# Patient Record
Sex: FEMALE | Race: WHITE | NOT HISPANIC OR LATINO | Employment: OTHER | URBAN - METROPOLITAN AREA
[De-identification: names, ages, dates, MRNs, and addresses within clinical notes are randomized per-mention and may not be internally consistent; named-entity substitution may affect disease eponyms.]

---

## 2017-03-30 ENCOUNTER — TRANSCRIBE ORDERS (OUTPATIENT)
Dept: ADMINISTRATIVE | Facility: HOSPITAL | Age: 62
End: 2017-03-30

## 2017-03-30 DIAGNOSIS — I73.9 CLAUDICATION (HCC): Primary | ICD-10-CM

## 2017-04-10 ENCOUNTER — HOSPITAL ENCOUNTER (OUTPATIENT)
Dept: GENERAL RADIOLOGY | Facility: HOSPITAL | Age: 62
Discharge: HOME | End: 2017-04-10
Attending: SURGERY

## 2017-04-10 ENCOUNTER — HOSPITAL ENCOUNTER (OUTPATIENT)
Dept: CARDIOLOGY | Facility: HOSPITAL | Age: 62
Discharge: HOME | End: 2017-04-10
Attending: SURGERY | Admitting: SURGERY

## 2017-04-10 DIAGNOSIS — I73.9 CLAUDICATION (HCC): ICD-10-CM

## 2017-04-10 DIAGNOSIS — M54.30 SCIATICA: ICD-10-CM

## 2017-04-10 LAB
BH CV LOWER ARTERIAL LEFT 2ND DIGIT SYS MAX: 97 MMHG
BH CV LOWER ARTERIAL LEFT 3RD DIGIT SYS MAX: 121 MMHG
BH CV LOWER ARTERIAL LEFT 4TH DIGIT SYS MAX: 98 MMHG
BH CV LOWER ARTERIAL LEFT 5TH DIGIT SYS MAX: 105 MMHG
BH CV LOWER ARTERIAL LEFT ABI RATIO: 1.13
BH CV LOWER ARTERIAL LEFT DORSALIS PEDIS SYS MAX: 164 MMHG
BH CV LOWER ARTERIAL LEFT GREAT TOE SYS MAX: 91 MMHG
BH CV LOWER ARTERIAL LEFT POST TIBIAL SYS MAX: 164 MMHG
BH CV LOWER ARTERIAL LEFT TBI RATIO: 0.63
BH CV LOWER ARTERIAL RIGHT 2ND DIGIT SYS MAX: 101 MMHG
BH CV LOWER ARTERIAL RIGHT 3RD DIGIT SYS MAX: 103 MMHG
BH CV LOWER ARTERIAL RIGHT 4TH DIGIT SYS MAX: 154 MMHG
BH CV LOWER ARTERIAL RIGHT 5TH DIGIT SYS MAX: 105 MMHG
BH CV LOWER ARTERIAL RIGHT ABI RATIO: 1.14
BH CV LOWER ARTERIAL RIGHT DORSALIS PEDIS SYS MAX: 165 MMHG
BH CV LOWER ARTERIAL RIGHT GREAT TOE SYS MAX: 112 MMHG
BH CV LOWER ARTERIAL RIGHT POST TIBIAL SYS MAX: 166 MMHG
BH CV LOWER ARTERIAL RIGHT TBI RATIO: 0.77
UPPER ARTERIAL LEFT ARM BRACHIAL SYS MAX: 131 MMHG
UPPER ARTERIAL RIGHT ARM BRACHIAL SYS MAX: 145 MMHG

## 2017-04-10 PROCEDURE — 93922 UPR/L XTREMITY ART 2 LEVELS: CPT

## 2017-04-10 PROCEDURE — 72110 X-RAY EXAM L-2 SPINE 4/>VWS: CPT

## 2017-04-17 ENCOUNTER — LAB (OUTPATIENT)
Dept: LAB | Facility: HOSPITAL | Age: 62
End: 2017-04-17
Attending: SURGERY

## 2017-04-17 ENCOUNTER — TRANSCRIBE ORDERS (OUTPATIENT)
Dept: ADMINISTRATIVE | Facility: HOSPITAL | Age: 62
End: 2017-04-17

## 2017-04-17 DIAGNOSIS — I80.9 PHLEBITIS: Primary | ICD-10-CM

## 2017-04-17 LAB
APTT PPP: 23.8 SECONDS (ref 22.7–35.4)
CHROMATIN AB SERPL-ACNC: <10 IU/ML (ref 0–14)
ERYTHROCYTE [SEDIMENTATION RATE] IN BLOOD: 15 MM/HR (ref 0–30)
FIBRINOGEN PPP-MCNC: 347 MG/DL (ref 219–464)
INR PPP: 0.92 (ref 0.9–1.1)
PROTHROMBIN TIME: 12 SECONDS (ref 11.7–14.2)

## 2017-04-17 PROCEDURE — 86431 RHEUMATOID FACTOR QUANT: CPT

## 2017-04-17 PROCEDURE — 85300 ANTITHROMBIN III ACTIVITY: CPT

## 2017-04-17 PROCEDURE — 85384 FIBRINOGEN ACTIVITY: CPT

## 2017-04-17 PROCEDURE — 81241 F5 GENE: CPT

## 2017-04-17 PROCEDURE — 36415 COLL VENOUS BLD VENIPUNCTURE: CPT

## 2017-04-17 PROCEDURE — 85610 PROTHROMBIN TIME: CPT

## 2017-04-17 PROCEDURE — 85303 CLOT INHIBIT PROT C ACTIVITY: CPT

## 2017-04-17 PROCEDURE — 85730 THROMBOPLASTIN TIME PARTIAL: CPT

## 2017-04-17 PROCEDURE — 81240 F2 GENE: CPT

## 2017-04-17 PROCEDURE — 85652 RBC SED RATE AUTOMATED: CPT

## 2017-04-17 PROCEDURE — 86038 ANTINUCLEAR ANTIBODIES: CPT

## 2017-04-17 PROCEDURE — 83090 ASSAY OF HOMOCYSTEINE: CPT

## 2017-04-17 PROCEDURE — 83695 ASSAY OF LIPOPROTEIN(A): CPT

## 2017-04-17 PROCEDURE — 85306 CLOT INHIBIT PROT S FREE: CPT

## 2017-04-18 LAB
AT III PPP CHRO-ACNC: 114 % (ref 90–134)
HCYS SERPL-SCNC: 12.8 UMOL/L (ref 0–15)
LPA SERPL-MCNC: 165 NMOL/L
PROT C PPP-ACNC: 189 % (ref 86–163)
PROT S ACT/NOR PPP: 79 % (ref 70–127)
PROT S FREE PPP-ACNC: 106 % (ref 49–138)

## 2017-04-19 LAB
ANA PAT SER-IMP: ABNORMAL
ANA SER QL: ABNORMAL

## 2017-04-20 LAB
CARDIOLIPIN IGA SER IA-ACNC: <9 APL U/ML (ref 0–11)
CARDIOLIPIN IGG SER IA-ACNC: <9 GPL U/ML (ref 0–14)
CARDIOLIPIN IGM SER IA-ACNC: <9 MPL U/ML (ref 0–12)
PS IGA SER-ACNC: 1 APS IGA (ref 0–20)
PS IGG SER-ACNC: 3 GPS IGG (ref 0–11)
PS IGM SER-ACNC: 8 MPS IGM (ref 0–25)

## 2017-04-24 ENCOUNTER — LAB (OUTPATIENT)
Dept: LAB | Facility: HOSPITAL | Age: 62
End: 2017-04-24

## 2017-04-24 ENCOUNTER — CONSULT (OUTPATIENT)
Dept: ONCOLOGY | Facility: CLINIC | Age: 62
End: 2017-04-24

## 2017-04-24 VITALS
HEART RATE: 64 BPM | OXYGEN SATURATION: 100 % | BODY MASS INDEX: 27.9 KG/M2 | DIASTOLIC BLOOD PRESSURE: 68 MMHG | RESPIRATION RATE: 16 BRPM | TEMPERATURE: 97.6 F | HEIGHT: 66 IN | WEIGHT: 173.6 LBS | SYSTOLIC BLOOD PRESSURE: 124 MMHG

## 2017-04-24 DIAGNOSIS — I80.3 THROMBOPHLEBITIS LEG: Primary | ICD-10-CM

## 2017-04-24 DIAGNOSIS — Z15.89 HX OF MTHFR MUTATION: Primary | ICD-10-CM

## 2017-04-24 LAB
BASOPHILS # BLD AUTO: 0.07 10*3/MM3 (ref 0–0.1)
BASOPHILS NFR BLD AUTO: 0.9 % (ref 0–1.1)
DEPRECATED RDW RBC AUTO: 46.2 FL (ref 37–49)
EOSINOPHIL # BLD AUTO: 0.08 10*3/MM3 (ref 0–0.36)
EOSINOPHIL NFR BLD AUTO: 1.1 % (ref 1–5)
ERYTHROCYTE [DISTWIDTH] IN BLOOD BY AUTOMATED COUNT: 13.7 % (ref 11.7–14.5)
F2 GENE MUT ANL BLD/T: NORMAL
F5 GENE MUT ANL BLD/T: NORMAL
HCT VFR BLD AUTO: 37.2 % (ref 34–45)
HGB BLD-MCNC: 12.4 G/DL (ref 11.5–14.9)
IMM GRANULOCYTES # BLD: 0.05 10*3/MM3 (ref 0–0.03)
IMM GRANULOCYTES NFR BLD: 0.7 % (ref 0–0.5)
LABORATORY COMMENT REPORT: NORMAL
LYMPHOCYTES # BLD AUTO: 1.52 10*3/MM3 (ref 1–3.5)
LYMPHOCYTES NFR BLD AUTO: 20 % (ref 20–49)
Lab: NORMAL
MCH RBC QN AUTO: 30.6 PG (ref 27–33)
MCHC RBC AUTO-ENTMCNC: 33.3 G/DL (ref 32–35)
MCV RBC AUTO: 91.9 FL (ref 83–97)
MONOCYTES # BLD AUTO: 0.6 10*3/MM3 (ref 0.25–0.8)
MONOCYTES NFR BLD AUTO: 7.9 % (ref 4–12)
NEUTROPHILS # BLD AUTO: 5.27 10*3/MM3 (ref 1.5–7)
NEUTROPHILS NFR BLD AUTO: 69.4 % (ref 39–75)
NRBC BLD MANUAL-RTO: 0 /100 WBC (ref 0–0)
PLATELET # BLD AUTO: 152 10*3/MM3 (ref 150–375)
PMV BLD AUTO: 11.2 FL (ref 8.9–12.1)
RBC # BLD AUTO: 4.05 10*6/MM3 (ref 3.9–5)
WBC NRBC COR # BLD: 7.59 10*3/MM3 (ref 4–10)

## 2017-04-24 PROCEDURE — 99244 OFF/OP CNSLTJ NEW/EST MOD 40: CPT | Performed by: INTERNAL MEDICINE

## 2017-04-24 PROCEDURE — 36415 COLL VENOUS BLD VENIPUNCTURE: CPT

## 2017-04-24 PROCEDURE — 85025 COMPLETE CBC W/AUTO DIFF WBC: CPT

## 2017-04-24 RX ORDER — MULTIVIT-MIN/IRON/FOLIC ACID/K 18-600-40
2000 CAPSULE ORAL DAILY
COMMUNITY
End: 2021-02-26

## 2017-04-24 RX ORDER — ALOSETRON HYDROCHLORIDE 0.5 MG/1
0.5 TABLET, FILM COATED ORAL AS NEEDED
COMMUNITY
End: 2021-02-26 | Stop reason: SDUPTHER

## 2017-04-24 RX ORDER — PREDNISONE 1 MG/1
3 TABLET ORAL AS NEEDED
COMMUNITY
Start: 2016-07-15 | End: 2021-12-07

## 2017-04-24 RX ORDER — DEXLANSOPRAZOLE 60 MG/1
60 CAPSULE, DELAYED RELEASE ORAL DAILY
COMMUNITY
End: 2020-06-03

## 2017-04-24 RX ORDER — LOVASTATIN 20 MG/1
20 TABLET ORAL NIGHTLY
COMMUNITY
Start: 2016-08-17

## 2017-04-24 RX ORDER — SUCRALFATE 1 G/1
1 TABLET ORAL AS NEEDED
COMMUNITY
Start: 2016-07-29 | End: 2021-12-07

## 2017-04-24 RX ORDER — LISINOPRIL 10 MG/1
10 TABLET ORAL DAILY
COMMUNITY
End: 2022-04-04

## 2017-04-24 RX ORDER — ESTRADIOL 0.05 MG/D
1 PATCH TRANSDERMAL WEEKLY
COMMUNITY
End: 2021-02-26

## 2017-04-24 NOTE — PROGRESS NOTES
Subjective     REASON FOR CONSULTATION:  History of superficial thrombophlebitis, review of hypercoagulable profile  Provide an opinion on any further workup or treatment                             REQUESTING PHYSICIAN:  Dr. Roblero    RECORDS OBTAINED:  Records of the patients history including those obtained from the referring provider were reviewed and summarized in detail.      History of Present Illness   Mrs. Tobin is a very pleasant 61-year-old woman seen today for evaluation at the request of vascular surgery.  She gives history of developing a painful nodule superior to the left ankle around January 2017.  She was evaluated by her PCP and diagnosed by ultrasound with a superficial thrombophlebitis of the left ankle.  She was treated with rest and anti-inflammatory medications.  Since the diagnosis of the superficial thrombus, she has experienced numbness involving the middle 3 digits of the left foot; she was not found to have any significant arterial compromise at the vascular surgeons evaluation.  She had a follow-up ultrasound 4/12/17 which showed a sclerosed greater saphenous vein on the left.  The patient has never experienced a deep venous thrombosis, pulmonary embolism, or arterial thrombosis.  The patient herself has had multiple surgical procedures including knee surgeries and experienced 2 pregnancies without thrombotic complications.    She gives a family history of a daughter who experienced a thrombosis during pregnancy and was diagnosed with a MT HFR gene mutation and possible protein S deficiency?  The history there is a bit unclear as apparently the daughter is not currently anticoagulated.  There are no other family members who have experienced recurrent from both sees to the patient's knowledge.    Past Medical History:   Diagnosis Date   • Mitral valve prolapse         Past Surgical History:   Procedure Laterality Date   • APPENDECTOMY     • HEMORRHOIDECTOMY     • HYSTERECTOMY     •  "REPLACEMENT TOTAL KNEE          No current outpatient prescriptions on file prior to visit.     No current facility-administered medications on file prior to visit.         ALLERGIES:    Allergies   Allergen Reactions   • Arava [Leflunomide]    • Celebrex [Celecoxib] Swelling   • Naproxen Swelling   • Plaquenil [Hydroxychloroquine Sulfate]         Social History     Social History   • Marital status:      Spouse name: Jose   • Number of children: N/A   • Years of education: N/A     Occupational History   •  [employer]     Social History Main Topics   • Smoking status: Former Smoker     Quit date: 1988   • Smokeless tobacco: None   • Alcohol use Yes      Comment: Occasional   • Drug use: No   • Sexual activity: Not Asked     Other Topics Concern   • None     Social History Narrative        Family History   Problem Relation Age of Onset   • Cancer Father         Review of Systems   HENT: Negative.    Eyes: Negative.    Respiratory: Negative.    Cardiovascular: Negative.    Gastrointestinal: Negative.    Genitourinary: Negative.    Musculoskeletal: Negative.    Neurological: Positive for dizziness and numbness.   Hematological: Negative.         Objective     Vitals:    04/24/17 1317   BP: 124/68   Pulse: 64   Resp: 16   Temp: 97.6 °F (36.4 °C)   SpO2: 100%   Weight: 173 lb 9.6 oz (78.7 kg)   Height: 66.14\" (168 cm)  Comment: new ht/pt   PainSc: 2  Comment: L foot pain     Current Status 4/24/2017   ECOG score 0       Physical Exam    Gen: pleasant, well-developed woman, nad  HEENT: no icterus  CV: RRR, S1S2  CHEST: CTA bilat  ABD: soft, ntnd  MS: cool toes bilaterally    RECENT LABS:  Hematology WBC   Date Value Ref Range Status   04/24/2017 7.59 4.00 - 10.00 10*3/mm3 Final     RBC   Date Value Ref Range Status   04/24/2017 4.05 3.90 - 5.00 10*6/mm3 Final     Hemoglobin   Date Value Ref Range Status   04/24/2017 12.4 11.5 - 14.9 g/dL Final     Hematocrit   Date Value Ref Range Status   04/24/2017 37.2 34.0 - 45.0 % " Final     Platelets   Date Value Ref Range Status   04/24/2017 152 150 - 375 10*3/mm3 Final      Protein C act 189  Protein S Ag 106  Protein S Func 79  Prothrombin Gene Mutation neg    AMEYA 1:640  ESR 15  Factor V Leiden pending  Homocysteine 12.8  AT-3 114  CL panel negative          Assessment/Plan     This is a 61-year-old woman with history of superficial thrombophlebitis of the left ankle and saphenous veins experienced January 2017.  She has no prior history of deep venous thrombosis, pulmonary embolism, or arterial thromboses.  She does have history of rheumatoid arthritis followed by rheumatology.    She had a hypercoagulable profile sent by vascular surgery.  She had normal protein C, protein S, and anti-thrombin 3 levels.  Prothrombin gene mutation was negative.  A factor V Leiden mutation is pending and I recommended she call vascular surgery or my office in another week for that result.  Apparently her daughter has a MT HFR gene mutation which of course is common in the general population.  The patient herself has a normal homocystine level of 12.8.  Her lipoprotein a is elevated at 165 but this is not a test I generally order or necessarily know how to interpret so I will defer that to the ordering physician.    At this time, the patient's history and lab work reviewed argue against a genetic hypercoagulable state.  To me her biggest risk factor appears to be the fact that she is on estrogen replacement therapy which is of course a well-known risk factor for development of superficial and deep venous thromboses.  Given the fact she has had a superficial thrombophlebitis, I recommended that she strongly consider discontinuing estrogen replacement therapy.  She will discuss that with her prescribing provider, Dr. Moreno and PCP.

## 2017-05-15 ENCOUNTER — OFFICE VISIT (OUTPATIENT)
Dept: SURGERY | Facility: CLINIC | Age: 62
End: 2017-05-15

## 2017-05-15 VITALS — WEIGHT: 173.8 LBS | BODY MASS INDEX: 27.28 KG/M2 | OXYGEN SATURATION: 97 % | HEART RATE: 76 BPM | HEIGHT: 67 IN

## 2017-05-15 DIAGNOSIS — R13.19 OTHER DYSPHAGIA: ICD-10-CM

## 2017-05-15 DIAGNOSIS — R07.9 CHEST PAIN, UNSPECIFIED TYPE: Primary | ICD-10-CM

## 2017-05-15 PROCEDURE — 99244 OFF/OP CNSLTJ NEW/EST MOD 40: CPT | Performed by: SURGERY

## 2017-05-18 ENCOUNTER — APPOINTMENT (OUTPATIENT)
Dept: ULTRASOUND IMAGING | Facility: HOSPITAL | Age: 62
End: 2017-05-18
Attending: SURGERY

## 2017-05-18 ENCOUNTER — HOSPITAL ENCOUNTER (OUTPATIENT)
Dept: GENERAL RADIOLOGY | Facility: HOSPITAL | Age: 62
Discharge: HOME | End: 2017-05-18
Attending: SURGERY

## 2017-07-13 ENCOUNTER — OFFICE VISIT (OUTPATIENT)
Dept: SURGERY | Facility: CLINIC | Age: 62
End: 2017-07-13

## 2017-07-13 DIAGNOSIS — R13.19 OTHER DYSPHAGIA: Primary | ICD-10-CM

## 2017-07-13 PROCEDURE — 99213 OFFICE O/P EST LOW 20 MIN: CPT | Performed by: SURGERY

## 2017-07-13 NOTE — PROGRESS NOTES
"CLINICAL SUMMARY (A/P):   61-year-old lady with long-standing dysphagia and studies as outlined above which do not provide us with a definitive diagnosis. Interestingly she had excellent response to Botox with subsequent recurrence, repeat EGD with Botox on June 2 provided some response but nothing like what she had after the initial treatment.  After her office visit here in May had recommended a repeat Botox treatment as well as an esophagram and gallbladder workup.  At this point she is had a negative gallbladder workup and repeat Botox treatment without the same dramatic response she had previously.  I therefore have scheduled her for follow-up esophagram which is scheduled for July 20 and further recommendations will follow this study.       CC: Dysphagia      HPI: 61-year-old lady referred for consultation regarding dysphagia by Dr. Lopez Loco. Reports dysphagia for a few years, surprisingly liquids cause more problems for her than solids. She underwent EGD with Botox injection in the fall of 2016 which \"helped tremendously.\"  Her symptoms started again in April 2017 and she also reported episodes of chest pain and upper abdominal discomfort for which I recommended gallbladder workup.  She was admitted to [facility] in early May secondary to severe chest pain and reports to me that she had a negative cardiac workup as well as a normal gallbladder ultrasound and HIDA scan.  She then underwent repeat EGD with Botox injection on June 2 and although she had some improvement in her symptoms it was not as dramatic as with the first treatment.     ROS: Negative for shortness of air. Negative for unexpected weight loss.  All other systems reviewed and negative other than presenting complaints.     PSH:   Appendectomy  Hysterectomy  Hemorrhoidectomy     PMH:   Hypertension  Gastroesophageal reflux disease  Superficial thrombophlebitis lower extremity (has seen [facility] oncology and had a negative hypercoagulable " workup)     MEDICATIONS: reviewed, in Epic     ALLERGIES: reviewed, in [facility]     FAMILY HISTORY:   Father with renal cell carcinoma  Negative for colon cancer     SOCIAL HISTORY:   Denies tobacco use  Occasional alcohol use     PHYSICAL EXAM:   Constitutional: Well-developed well-nourished, no acute distress  Eyes: Conjunctiva normal, sclera nonicteric  ENMT: Hearing grossly normal, oral mucosa moist  Respiratory: Clear to auscultation, normal inspiratory effort  Cardiovascular: Regular rate, no murmur, no carotid bruit, no peripheral edema, no jugular venous distention  Gastrointestinal: Soft, nontender  Skin: Warm, dry, no rash on visualized skin surfaces  Musculoskeletal: Symmetric strength, normal gait  Psychiatric: Alert and oriented ×3, normal affect      RADIOLOGY/ENDOSCOPY:   EGD 10/20/2016 demonstrated a normal esophagus. Botox injection performed. Gastritis noted. No other abnormality. Biopsies unremarkable.     Esophageal manometry May 18, 2016: Abnormal esophageal manometry with gastroesophageal junction outflow obstruction versus frequent failed peristalsis versus weak peristalsis with large peristaltic breaks      24 impedance pH in June 2016 demonstrated predominantly non-acid reflux with negative symptom correlation     Esophagram in April 2016 demonstrated some esophageal dysmotility but barium tablet passed through the GE junction without difficulty.     Pranav Perez M.D.

## 2017-07-20 ENCOUNTER — HOSPITAL ENCOUNTER (OUTPATIENT)
Dept: GENERAL RADIOLOGY | Facility: HOSPITAL | Age: 62
Discharge: HOME | End: 2017-07-20
Attending: SURGERY | Admitting: SURGERY

## 2017-07-20 DIAGNOSIS — R13.19 OTHER DYSPHAGIA: ICD-10-CM

## 2017-07-20 PROCEDURE — 74220 X-RAY XM ESOPHAGUS 1CNTRST: CPT

## 2017-07-27 ENCOUNTER — TELEPHONE (OUTPATIENT)
Dept: SURGERY | Facility: CLINIC | Age: 62
End: 2017-07-27

## 2017-07-28 ENCOUNTER — DOCUMENTATION (OUTPATIENT)
Dept: SURGERY | Facility: CLINIC | Age: 62
End: 2017-07-28

## 2017-07-28 NOTE — PROGRESS NOTES
Spoke with her at length about her UGI result. Showed mild to severe distal esophageal spasm.  Picture is not consistent with achalasia.  I recommended evaluation at [facility] to see if she would be a candidate for POEM. She is going to discuss with her  and indicated she might like to come to the office to discuss in person.

## 2020-06-03 ENCOUNTER — OFFICE VISIT (OUTPATIENT)
Dept: GASTROENTEROLOGY | Facility: CLINIC | Age: 65
End: 2020-06-03

## 2020-06-03 VITALS
OXYGEN SATURATION: 98 % | DIASTOLIC BLOOD PRESSURE: 80 MMHG | WEIGHT: 191.5 LBS | HEART RATE: 76 BPM | SYSTOLIC BLOOD PRESSURE: 156 MMHG | TEMPERATURE: 98.2 F | BODY MASS INDEX: 30.06 KG/M2 | HEIGHT: 67 IN

## 2020-06-03 DIAGNOSIS — K21.9 GASTROESOPHAGEAL REFLUX DISEASE WITHOUT ESOPHAGITIS: Primary | ICD-10-CM

## 2020-06-03 DIAGNOSIS — K58.0 IRRITABLE BOWEL SYNDROME WITH DIARRHEA: ICD-10-CM

## 2020-06-03 DIAGNOSIS — R13.19 ESOPHAGEAL DYSPHAGIA: ICD-10-CM

## 2020-06-03 PROCEDURE — 99213 OFFICE O/P EST LOW 20 MIN: CPT | Performed by: NURSE PRACTITIONER

## 2020-06-03 RX ORDER — ASPIRIN 81 MG/1
81 TABLET, CHEWABLE ORAL DAILY
COMMUNITY

## 2020-06-03 RX ORDER — CETIRIZINE HYDROCHLORIDE 5 MG/1
5 TABLET ORAL NIGHTLY
COMMUNITY
End: 2021-12-07

## 2020-06-03 RX ORDER — HYOSCYAMINE SULFATE 0.12 MG/ML
LIQUID ORAL
COMMUNITY
End: 2021-03-31

## 2020-06-03 RX ORDER — OMEPRAZOLE 40 MG/1
40 CAPSULE, DELAYED RELEASE ORAL DAILY
Qty: 90 CAPSULE | Refills: 3 | Status: SHIPPED | OUTPATIENT
Start: 2020-06-03 | End: 2021-05-27

## 2020-06-03 NOTE — PATIENT INSTRUCTIONS
For GERD, continue omeprazole 40 mg daily.  Refill provided.    For dysphagia, may continue to use hyoscyamine as needed.    For IBS with diarrhea, may continue to use Lotronex needed.    Follow-up yearly and as needed.  Call for any new or worsening symptoms.

## 2020-06-03 NOTE — PROGRESS NOTES
Chief Complaint   Patient presents with   • Follow-up     medication refill       HPI  Patient is a 64-year-old female who presents today for follow-up.    She has a history of GERD, dysphagia with prior abnormal esophageal manometry and Botox injection, also prior surgical consultation, history of IBS-D.    Patient presents today for follow-up and for refill of omeprazole.  Patient previously been on Dexilant for GERD however her insurance was no longer covering this affordably and she transition to omeprazole 40 mg daily.  She does feels that this is controlling her symptoms.  Symptoms have been well controlled.  She denies any nausea or vomiting.  She reports she has had occasional issues with dysphagia, this primarily occurs with liquids.  It is occurring relatively infrequently at this point and overall she feels it is significantly improved than it was several years ago.  She uses hyoscyamine for this as needed.    She denies any abdominal pain.    Her bowels are moving regularly.  She has not had any significant issues with diarrhea.  She takes Lotronex occasionally for diarrhea but has not required regular use of any medications.  She denies any blood in the stool or dark stools.    Overall, reports she is feeling well    Review of Systems   Constitutional: Negative for appetite change, chills, diaphoresis, fatigue, fever and unexpected weight change.   HENT: Negative for dental problem, ear pain, mouth sores, rhinorrhea, sore throat and voice change.    Eyes: Negative for pain, redness and visual disturbance.   Respiratory: Negative for cough, chest tightness and wheezing.    Cardiovascular: Negative for chest pain, palpitations and leg swelling.   Endocrine: Negative for cold intolerance, heat intolerance, polydipsia, polyphagia and polyuria.   Genitourinary: Negative for dysuria, frequency, hematuria and urgency.   Musculoskeletal: Positive for arthralgias. Negative for back pain, joint swelling, myalgias  and neck pain.   Skin: Negative for rash.   Allergic/Immunologic: Negative for environmental allergies, food allergies and immunocompromised state.   Neurological: Negative for dizziness, seizures, weakness, numbness and headaches.   Hematological: Does not bruise/bleed easily.   Psychiatric/Behavioral: Negative for sleep disturbance. The patient is not nervous/anxious.         Problem List:    Patient Active Problem List   Diagnosis   • Thrombophlebitis leg       Medical History:    Past Medical History:   Diagnosis Date   • Esophagitis    • GERD (gastroesophageal reflux disease)    • Hypertension    • Mitral valve prolapse    • Peptic ulceration    • RA (rheumatoid arthritis) (CMS/[facility])    • Superficial phlebitis     in left ankle    • Tattoos     permanent makeup         Social History:    Social History     Socioeconomic History   • Marital status:      Spouse name: Jose   • Number of children: Not on file   • Years of education: High school   • Highest education level: Not on file   Occupational History     Employer: RETIRED   Tobacco Use   • Smoking status: Former Smoker     Packs/day: 1.00     Years: 22.00     Pack years: 22.00     Types: Cigarettes     Last attempt to quit:      Years since quittin.4   • Smokeless tobacco: Never Used   Substance and Sexual Activity   • Alcohol use: Yes     Comment: Socially    • Drug use: No   • Sexual activity: Defer       Family History:   Family History   Problem Relation Age of Onset   • Cancer Father         kidney   • Hypertension Mother    • Diabetes Maternal Grandmother    • Colon polyps Brother        Surgical History:   Past Surgical History:   Procedure Laterality Date   • ABDOMINOPLASTY N/A 2014    Due to appendectomy site having staph infection-Dr. Keagan Yip   • APPENDECTOMY N/A     Dr. Henry   • COLONOSCOPY  2018   • ENDOSCOPY N/A     Dr. Rajesh Loco    • FLEXIBLE SIGMOIDOSCOPY N/A     Dr. Rajesh Loco   •  HEMORRHOIDECTOMY N/A 2009    Dr. Saba   • KNEE ARTHROSCOPY W/ MENISCECTOMY Left 2004    Dr. Rice   • KNEE MENISCECTOMY Right 2007    Dr. Rice   • SUBTOTAL HYSTERECTOMY Bilateral 1988    Dr. Sanford   • TUBAL ABDOMINAL LIGATION Bilateral    • UPPER GASTROINTESTINAL ENDOSCOPY N/A 10/20/2016    UGI with biopsy. Normal esophagus: injected with botulinum toxin, Erythematous mucoas in the antrum: biopsied, normal duodenum-Dr. Rajesh Lcoo         Current Outpatient Medications:   •  alosetron (LOTRONEX) 0.5 MG tablet, Take 0.5 mg by mouth As Needed., Disp: , Rfl:   •  aspirin 81 MG chewable tablet, Chew 81 mg Daily., Disp: , Rfl:   •  cetirizine (zyrTEC) 5 MG tablet, Take 5 mg by mouth Daily., Disp: , Rfl:   •  diclofenac (VOLTAREN) 1 % gel gel, Place 4 g on the skin As Needed., Disp: , Rfl:   •  etanercept (ENBREL) 50 MG/ML solution prefilled syringe injection, Inject 50 mg under the skin 1 (One) Time Per Week., Disp: , Rfl:   •  hyoscyamine (LEVSIN) 0.125 MG/ML solution, Take  by mouth., Disp: , Rfl:   •  lisinopril (PRINIVIL,ZESTRIL) 10 MG tablet, Take 10 mg by mouth Daily., Disp: , Rfl:   •  lovastatin (MEVACOR) 20 MG tablet, Take 20 mg by mouth Every Night., Disp: , Rfl:   •  predniSONE (DELTASONE) 1 MG tablet, Take 3 mg by mouth Daily., Disp: , Rfl:   •  sucralfate (CARAFATE) 1 G tablet, Take 1 g by mouth 4 (Four) Times a Day., Disp: , Rfl:   •  Cholecalciferol (VITAMIN D) 2000 UNITS capsule, Take 2,000 Units by mouth Daily., Disp: , Rfl:   •  estradiol (CLIMARA) 0.05 MG/24HR patch, Place 1 patch on the skin 1 (One) Time Per Week., Disp: , Rfl:   •  omeprazole (priLOSEC) 40 MG capsule, Take 1 capsule by mouth Daily., Disp: 90 capsule, Rfl: 3  •  Phentermine HCl 37.5 MG tablet dispersible, Take 18.75 mg by mouth Daily., Disp: , Rfl:   •  VITAMIN K PO, Take 100 mcg by mouth Daily., Disp: , Rfl:     Allergies:  Arava [leflunomide]; Celebrex [celecoxib]; Naproxen; Plaquenil [hydroxychloroquine sulfate]; and  Prednisone    The following portions of the patient's history were reviewed and updated as appropriate: allergies, current medications, past family history, past medical history, past social history, past surgical history and problem list.    Vitals:    06/03/20 1035   BP: 156/80   Pulse: 76   Temp: 98.2 °F (36.8 °C)   SpO2: 98%         06/03/20  1035   Weight: 86.9 kg (191 lb 8 oz)     Body mass index is 29.99 kg/m².    Physical Exam   Constitutional: She is oriented to person, place, and time. She appears well-developed and well-nourished. No distress.   HENT:   Head: Normocephalic and atraumatic.   Eyes: No scleral icterus.   Cardiovascular: Normal rate and regular rhythm.   Pulmonary/Chest: Effort normal and breath sounds normal. No respiratory distress.   Abdominal: Soft. Bowel sounds are normal. She exhibits no distension. There is no tenderness.   Neurological: She is alert and oriented to person, place, and time.   Skin: Skin is warm and dry.   Psychiatric: She has a normal mood and affect. Thought content normal.   Vitals reviewed.        Assessment/ Plan  Patsy was seen today for follow-up.    Diagnoses and all orders for this visit:    Gastroesophageal reflux disease without esophagitis    Esophageal dysphagia    Irritable bowel syndrome with diarrhea    Other orders  -     omeprazole (priLOSEC) 40 MG capsule; Take 1 capsule by mouth Daily.         Return in about 1 year (around 6/3/2021).    Patient Instructions   For GERD, continue omeprazole 40 mg daily.  Refill provided.    For dysphagia, may continue to use hyoscyamine as needed.    For IBS with diarrhea, may continue to use Lotronex needed.    Follow-up yearly and as needed.  Call for any new or worsening symptoms.          Discussion:  Symptomatically, patient is doing very well at this time.  We will continue current regimen.

## 2021-02-26 ENCOUNTER — TELEPHONE (OUTPATIENT)
Dept: GASTROENTEROLOGY | Facility: CLINIC | Age: 66
End: 2021-02-26

## 2021-02-26 ENCOUNTER — PREP FOR SURGERY (OUTPATIENT)
Dept: SURGERY | Facility: SURGERY CENTER | Age: 66
End: 2021-02-26

## 2021-02-26 ENCOUNTER — OFFICE VISIT (OUTPATIENT)
Dept: GASTROENTEROLOGY | Facility: CLINIC | Age: 66
End: 2021-02-26

## 2021-02-26 VITALS
SYSTOLIC BLOOD PRESSURE: 130 MMHG | HEIGHT: 67 IN | HEART RATE: 75 BPM | OXYGEN SATURATION: 98 % | DIASTOLIC BLOOD PRESSURE: 80 MMHG | BODY MASS INDEX: 30.1 KG/M2 | RESPIRATION RATE: 16 BRPM | WEIGHT: 191.8 LBS | TEMPERATURE: 97.3 F

## 2021-02-26 DIAGNOSIS — R19.7 DIARRHEA, UNSPECIFIED TYPE: ICD-10-CM

## 2021-02-26 DIAGNOSIS — K62.5 RECTAL BLEEDING: ICD-10-CM

## 2021-02-26 DIAGNOSIS — D64.9 ANEMIA, UNSPECIFIED TYPE: ICD-10-CM

## 2021-02-26 DIAGNOSIS — K21.9 GASTROESOPHAGEAL REFLUX DISEASE, UNSPECIFIED WHETHER ESOPHAGITIS PRESENT: ICD-10-CM

## 2021-02-26 DIAGNOSIS — K21.9 GASTROESOPHAGEAL REFLUX DISEASE, UNSPECIFIED WHETHER ESOPHAGITIS PRESENT: Primary | ICD-10-CM

## 2021-02-26 DIAGNOSIS — K62.5 RECTAL BLEEDING: Primary | ICD-10-CM

## 2021-02-26 PROCEDURE — 99214 OFFICE O/P EST MOD 30 MIN: CPT | Performed by: NURSE PRACTITIONER

## 2021-02-26 RX ORDER — SODIUM CHLORIDE 0.9 % (FLUSH) 0.9 %
10 SYRINGE (ML) INJECTION AS NEEDED
Status: CANCELLED | OUTPATIENT
Start: 2021-02-26

## 2021-02-26 RX ORDER — SODIUM CHLORIDE, SODIUM LACTATE, POTASSIUM CHLORIDE, CALCIUM CHLORIDE 600; 310; 30; 20 MG/100ML; MG/100ML; MG/100ML; MG/100ML
30 INJECTION, SOLUTION INTRAVENOUS CONTINUOUS PRN
Status: CANCELLED | OUTPATIENT
Start: 2021-02-26

## 2021-02-26 RX ORDER — ALOSETRON HYDROCHLORIDE 0.5 MG/1
0.5 TABLET, FILM COATED ORAL 2 TIMES DAILY PRN
Qty: 60 TABLET | Refills: 5 | Status: ON HOLD | OUTPATIENT
Start: 2021-02-26 | End: 2022-11-21

## 2021-02-26 RX ORDER — SODIUM CHLORIDE 0.9 % (FLUSH) 0.9 %
3 SYRINGE (ML) INJECTION EVERY 12 HOURS SCHEDULED
Status: CANCELLED | OUTPATIENT
Start: 2021-02-26

## 2021-02-26 NOTE — TELEPHONE ENCOUNTER
Do not take Lotronex.  Please schedule follow-up visit.  Tiffany can see her today at 1 or 130 or I can see her next week.

## 2021-02-26 NOTE — PATIENT INSTRUCTIONS
1.  For further evaluation of rectal bleeding and anemia we will check a CBC, iron panel, and ferritin level today.    2.  Additionally for rectal bleeding and anemia we will schedule both an EGD and colonoscopy.    3.  For GERD, continue omeprazole 40 mg once daily and sucralfate as needed.    4.  For diarrhea you may continue use of Lotronex 0.5 mg 1 tablet up to twice daily as needed.  Refills have been sent to your pharmacy.    5.  You may also continue use of hyoscyamine 0.125 mg tablets for abdominal cramping, fecal urgency, and diarrhea.  You may take 1 tablet up to 4 times daily as needed.    6.  Additional recommendations pending outcome of lab work and endoscopy.

## 2021-02-26 NOTE — TELEPHONE ENCOUNTER
Pt. States last night after she ate dinner she had abdominal cramping. Went to the restroom and had bright red blood in her stool. Had 3 Bowel movements with blood, then went to bed.  States she woke up this morning and had a dark red stool. Just went a few minutes ago and had bright red blood. States she would rate it as a small pancake size of blood each time. Denies any pain. She has Lotronex on hand , but it is . She has not took anything. Would like to know what to do for blood and frequent stools?

## 2021-02-26 NOTE — PROGRESS NOTES
Chief Complaint   Patient presents with   • Rectal Bleeding     New onset   • Diarrhea     Worsening         History of Present Illness  65-year-old female presents today for a walk-in office visit.  She was last seen in office on 6/3/2020.  She has a history of GERD, dysphagia, abnormal esophageal manometry with Botox injection, and IBS-D.    She presents today with a new onset of rectal bleeding that began last evening.  She reports that the symptoms of bright red blood per rectum in conjunction with profound diarrhea began after eating her dinner.  She has a history of IBS-D and uses Lotronex 0.5 mg on a rare occasion.  Generally she reports having regular daily bowel movement.  In conjunction with the rectal bleeding and diarrhea, she reported bilateral lower quadrant abdominal discomfort and nausea.  She does have a history of hemorrhoids and occasionally will see a streak of blood on the toilet tissue, however this was different.  At times she reported only passage of blood without any bowel movement.  This morning she reports that the blood per rectum has turned more of a dark brown/dark red color and seems to have slowed down.  Bowel movements consist of diarrhea.    She also has a history of rheumatoid arthritis and follows with Dr. Duran.    Her last colonoscopy was performed on 6/10/2019 and revealed a small erosion in the terminal ileum, diverticulosis, and nonbleeding internal hemorrhoids.  She denies any history of colon polyps.    For GERD, she continues omeprazole 40 mg once daily with good control of symptoms.  Aside from new onset of nausea that occurred last night with the above symptoms, she was not previously having any nausea, or vomiting.  She denies any dysphagia.    Review of Systems   Constitutional: Negative.    HENT: Negative for trouble swallowing.    Respiratory: Negative for cough, chest tightness and shortness of breath.    Cardiovascular: Negative for chest pain.   Gastrointestinal:  "Positive for abdominal pain, anal bleeding, diarrhea and nausea. Negative for abdominal distention, blood in stool, constipation, rectal pain and vomiting.      Result Review :       SCANNED - COLONOSCOPY (06/10/2019)  Office Visit with Latonia Carbone APRN (06/03/2020)  CBC AND DIFFERENTIAL (12/29/2020 03:12)  CBC AND DIFFERENTIAL (12/01/2020 10:13)    Vital Signs:   /80   Pulse 75   Temp 97.3 °F (36.3 °C)   Resp 16   Ht 170.2 cm (67\")   Wt 87 kg (191 lb 12.8 oz)   SpO2 98%   BMI 30.04 kg/m²     Body mass index is 30.04 kg/m².     Physical Exam  Vitals signs reviewed.   Constitutional:       Appearance: Normal appearance. She is obese.   Pulmonary:      Effort: Pulmonary effort is normal. No respiratory distress.   Abdominal:      General: Abdomen is flat. Bowel sounds are normal. There is no distension.      Palpations: Abdomen is soft. There is no mass.      Tenderness: There is abdominal tenderness. There is no guarding.      Comments: Abdominal sensitivity noted diffusely on exam, but no abdominal tenderness or pain.   Genitourinary:     Comments: Anal rectal exam did not reveal any abnormality except for some mild internal hemorrhoids.  There was a small amount of bright red fresh blood noted on internal exam.  Musculoskeletal: Normal range of motion.   Skin:     General: Skin is warm and dry.   Neurological:      General: No focal deficit present.      Mental Status: She is alert and oriented to person, place, and time.   Psychiatric:         Mood and Affect: Mood normal.         Behavior: Behavior normal.         Thought Content: Thought content normal.         Judgment: Judgment normal.       Assessment and Plan    Diagnoses and all orders for this visit:    1. Gastroesophageal reflux disease, unspecified whether esophagitis present (Primary)    2. Rectal bleeding  Comments:  new problem  Orders:  -     CBC & Differential  -     Ferritin  -     Iron Profile    3. Anemia, unspecified type  -   "   CBC & Differential  -     Ferritin  -     Iron Profile    4. Diarrhea, unspecified type    Other orders  -     alosetron (LOTRONEX) 0.5 MG tablet; Take 1 tablet by mouth 2 (Two) Times a Day As Needed (diarrhea).  Dispense: 60 tablet; Refill: 5             Follow Up   Return for pending test results and symptoms.    Patient Instructions   1.  For further evaluation of rectal bleeding and anemia we will check a CBC, iron panel, and ferritin level today.    2.  Additionally for rectal bleeding and anemia we will schedule both an EGD and colonoscopy.    3.  For GERD, continue omeprazole 40 mg once daily and sucralfate as needed.    4.  For diarrhea you may continue use of Lotronex 0.5 mg 1 tablet up to twice daily as needed.  Refills have been sent to your pharmacy.    5.  You may also continue use of hyoscyamine 0.125 mg tablets for abdominal cramping, fecal urgency, and diarrhea.  You may take 1 tablet up to 4 times daily as needed.    6.  Additional recommendations pending outcome of lab work and endoscopy.     Discussion:    For further evaluation of anemia, which has been present based upon review of lab work extending back to 12/1/2020, we will recheck a CBC, iron panel, and ferritin level.  We will also proceed with a EGD and colonoscopy for further evaluation of anemia and rectal bleeding.  Bright red blood per rectum was noted on internal exam today.  Patient to continue use of hyoscyamine for abdominal cramping and Lotronex on an as-needed basis as she previously had done before with intermittent episodes of IBS-D.    For GERD, patient to continue omeprazole 40 mg once daily.  Additional recommendations pending outcome of lab work and endoscopy findings.  Patient verbalized understand of above plan of care and is in agreement.  All questions answered and support provided.

## 2021-02-27 LAB
BASOPHILS # BLD AUTO: 0.09 10*3/MM3 (ref 0–0.2)
BASOPHILS NFR BLD AUTO: 0.8 % (ref 0–1.5)
EOSINOPHIL # BLD AUTO: 0.27 10*3/MM3 (ref 0–0.4)
EOSINOPHIL NFR BLD AUTO: 2.4 % (ref 0.3–6.2)
ERYTHROCYTE [DISTWIDTH] IN BLOOD BY AUTOMATED COUNT: 13.4 % (ref 12.3–15.4)
FERRITIN SERPL-MCNC: 71.2 NG/ML (ref 13–150)
HCT VFR BLD AUTO: 37 % (ref 34–46.6)
HGB BLD-MCNC: 12.2 G/DL (ref 12–15.9)
IMM GRANULOCYTES # BLD AUTO: 0.06 10*3/MM3 (ref 0–0.05)
IMM GRANULOCYTES NFR BLD AUTO: 0.5 % (ref 0–0.5)
IRON SATN MFR SERPL: 12 % (ref 20–50)
IRON SERPL-MCNC: 50 MCG/DL (ref 37–145)
LYMPHOCYTES # BLD AUTO: 1.72 10*3/MM3 (ref 0.7–3.1)
LYMPHOCYTES NFR BLD AUTO: 15.1 % (ref 19.6–45.3)
MCH RBC QN AUTO: 29.5 PG (ref 26.6–33)
MCHC RBC AUTO-ENTMCNC: 33 G/DL (ref 31.5–35.7)
MCV RBC AUTO: 89.6 FL (ref 79–97)
MONOCYTES # BLD AUTO: 0.6 10*3/MM3 (ref 0.1–0.9)
MONOCYTES NFR BLD AUTO: 5.3 % (ref 5–12)
NEUTROPHILS # BLD AUTO: 8.68 10*3/MM3 (ref 1.7–7)
NEUTROPHILS NFR BLD AUTO: 75.9 % (ref 42.7–76)
NRBC BLD AUTO-RTO: 0 /100 WBC (ref 0–0.2)
PLATELET # BLD AUTO: 267 10*3/MM3 (ref 140–450)
RBC # BLD AUTO: 4.13 10*6/MM3 (ref 3.77–5.28)
TIBC SERPL-MCNC: 409 MCG/DL
UIBC SERPL-MCNC: 359 MCG/DL (ref 112–346)
WBC # BLD AUTO: 11.42 10*3/MM3 (ref 3.4–10.8)

## 2021-03-04 ENCOUNTER — TELEPHONE (OUTPATIENT)
Dept: GASTROENTEROLOGY | Facility: CLINIC | Age: 66
End: 2021-03-04

## 2021-03-04 NOTE — TELEPHONE ENCOUNTER
Her blood work all looks good, her hemoglobin and hematocrit are now within normal range and she has no longer anemic.

## 2021-03-04 NOTE — TELEPHONE ENCOUNTER
Patient was advise. Can you review her recent lab work? She is wanting to know the results of these.They are in her chart.

## 2021-03-04 NOTE — TELEPHONE ENCOUNTER
Please let patient know that this was denied.  If needed for diarrhea, would recommend trial of Imodium over-the-counter on an as-needed basis

## 2021-03-09 ENCOUNTER — TRANSCRIBE ORDERS (OUTPATIENT)
Dept: LAB | Facility: SURGERY CENTER | Age: 66
End: 2021-03-09

## 2021-03-09 DIAGNOSIS — Z01.818 OTHER SPECIFIED PRE-OPERATIVE EXAMINATION: Primary | ICD-10-CM

## 2021-03-09 NOTE — DISCHARGE INSTRUCTIONS
Education provided the Patient on the following:    - Nothing to Eat or Drink after MN the night before the procedure  -Your required COVID Test is Scheduled on          Between the Hours of 5656-4710  -You will only be notified if your COVID test Result is POSITIVE  -The importance of reducing your number of contacts by self quarantining after you COVID test until the date of your EGD  -You will need to have someone drive you home after your EGD and remain with you for 24 hours after the EGD  - The date of your EGD, your ride is welcome to either remain in our parking lot or within 10-15 minutes of [place]  -Please wear warm socks when you arrive for your EGD  -Remove all jewelry and leave any valuables before arriving on the date of your procedure (all will have to be removed before leaving preop)  -You will need to arrive at           on           for your EGD  -Feel free to contact us at: [phone] with any additional questions/concerns        Have you ever tested positive for COVID?  If so when?Education provided the Patient on the following:    - Nothing to Eat or Drink after MN the night before the procedure  -Your required COVID Test is Scheduled on          Between the Hours of 3195-1338  -You will only be notified if your COVID test Result is POSITIVE  -The importance of reducing your number of contacts by self quarantining after you COVID test until the date of your Colonoscopy and EGD    - Avoid red/purple fluids while completing their bowel prep as ordered by physician  -Contact Gastrointerologist office for any questions about specific details regarding colon prep    -You will need to have someone drive you home after your colonoscopy and remain with you for 24 hours after the procedure  - The date of your procedure, your ride is welcome to either remain in our parking lot or within 10-15 minutes of [place]  -Please wear warm socks when you arrive for your procedure  -Remove all  [place] and leave any valuables before arriving the day of your procedure (all will have to be removed before leaving preop)  -You will need to arrive at      0600     on           for your procedure  0730  -Feel free to contact us at: [phone] with any additional questions/concerns         Has the patient ever tested Positive COVID?   no  If so when?

## 2021-03-10 ENCOUNTER — LAB (OUTPATIENT)
Dept: LAB | Facility: SURGERY CENTER | Age: 66
End: 2021-03-10

## 2021-03-10 DIAGNOSIS — Z01.818 OTHER SPECIFIED PRE-OPERATIVE EXAMINATION: ICD-10-CM

## 2021-03-10 PROCEDURE — U0004 COV-19 TEST NON-CDC HGH THRU: HCPCS | Performed by: SURGERY

## 2021-03-10 PROCEDURE — C9803 HOPD COVID-19 SPEC COLLECT: HCPCS

## 2021-03-11 LAB — SARS-COV-2 ORF1AB RESP QL NAA+PROBE: NOT DETECTED

## 2021-03-12 ENCOUNTER — HOSPITAL ENCOUNTER (OUTPATIENT)
Facility: SURGERY CENTER | Age: 66
Setting detail: HOSPITAL OUTPATIENT SURGERY
Discharge: HOME | End: 2021-03-12
Attending: INTERNAL MEDICINE | Admitting: INTERNAL MEDICINE

## 2021-03-12 ENCOUNTER — ANESTHESIA (OUTPATIENT)
Dept: SURGERY | Facility: SURGERY CENTER | Age: 66
End: 2021-03-12

## 2021-03-12 ENCOUNTER — ANESTHESIA EVENT (OUTPATIENT)
Dept: SURGERY | Facility: SURGERY CENTER | Age: 66
End: 2021-03-12

## 2021-03-12 VITALS
OXYGEN SATURATION: 100 % | HEART RATE: 68 BPM | RESPIRATION RATE: 20 BRPM | BODY MASS INDEX: 29.82 KG/M2 | DIASTOLIC BLOOD PRESSURE: 47 MMHG | WEIGHT: 190 LBS | SYSTOLIC BLOOD PRESSURE: 103 MMHG | TEMPERATURE: 98.4 F | HEIGHT: 67 IN

## 2021-03-12 DIAGNOSIS — K21.9 GASTROESOPHAGEAL REFLUX DISEASE, UNSPECIFIED WHETHER ESOPHAGITIS PRESENT: ICD-10-CM

## 2021-03-12 DIAGNOSIS — D64.9 ANEMIA, UNSPECIFIED TYPE: ICD-10-CM

## 2021-03-12 DIAGNOSIS — K62.5 RECTAL BLEEDING: ICD-10-CM

## 2021-03-12 DIAGNOSIS — R19.7 DIARRHEA, UNSPECIFIED TYPE: ICD-10-CM

## 2021-03-12 PROCEDURE — 0DBN8ZZ EXCISION OF SIGMOID COLON, VIA NATURAL OR ARTIFICIAL OPENING ENDOSCOPIC: ICD-10-PCS | Performed by: NURSE PRACTITIONER

## 2021-03-12 PROCEDURE — 43239 EGD BIOPSY SINGLE/MULTIPLE: CPT | Performed by: INTERNAL MEDICINE

## 2021-03-12 PROCEDURE — 45380 COLONOSCOPY AND BIOPSY: CPT | Performed by: INTERNAL MEDICINE

## 2021-03-12 PROCEDURE — 88305 TISSUE EXAM BY PATHOLOGIST: CPT | Performed by: INTERNAL MEDICINE

## 2021-03-12 PROCEDURE — 0DB68ZZ EXCISION OF STOMACH, VIA NATURAL OR ARTIFICIAL OPENING ENDOSCOPIC: ICD-10-PCS | Performed by: NURSE PRACTITIONER

## 2021-03-12 PROCEDURE — 25010000002 PROPOFOL 10 MG/ML EMULSION: Performed by: ANESTHESIOLOGY

## 2021-03-12 RX ORDER — PROPOFOL 10 MG/ML
VIAL (ML) INTRAVENOUS AS NEEDED
Status: DISCONTINUED | OUTPATIENT
Start: 2021-03-12 | End: 2021-03-12 | Stop reason: SURG

## 2021-03-12 RX ORDER — SODIUM CHLORIDE, SODIUM LACTATE, POTASSIUM CHLORIDE, CALCIUM CHLORIDE 600; 310; 30; 20 MG/100ML; MG/100ML; MG/100ML; MG/100ML
30 INJECTION, SOLUTION INTRAVENOUS CONTINUOUS PRN
Status: DISCONTINUED | OUTPATIENT
Start: 2021-03-12 | End: 2021-03-12 | Stop reason: HOSPADM

## 2021-03-12 RX ORDER — LIDOCAINE HYDROCHLORIDE 20 MG/ML
INJECTION, SOLUTION INFILTRATION; PERINEURAL AS NEEDED
Status: DISCONTINUED | OUTPATIENT
Start: 2021-03-12 | End: 2021-03-12 | Stop reason: SURG

## 2021-03-12 RX ORDER — SODIUM CHLORIDE 0.9 % (FLUSH) 0.9 %
3 SYRINGE (ML) INJECTION EVERY 12 HOURS SCHEDULED
Status: DISCONTINUED | OUTPATIENT
Start: 2021-03-12 | End: 2021-03-12 | Stop reason: HOSPADM

## 2021-03-12 RX ORDER — MAGNESIUM HYDROXIDE 1200 MG/15ML
LIQUID ORAL AS NEEDED
Status: DISCONTINUED | OUTPATIENT
Start: 2021-03-12 | End: 2021-03-12 | Stop reason: HOSPADM

## 2021-03-12 RX ORDER — SODIUM CHLORIDE 0.9 % (FLUSH) 0.9 %
10 SYRINGE (ML) INJECTION AS NEEDED
Status: DISCONTINUED | OUTPATIENT
Start: 2021-03-12 | End: 2021-03-12 | Stop reason: HOSPADM

## 2021-03-12 RX ADMIN — LIDOCAINE HYDROCHLORIDE 40 MG: 20 INJECTION, SOLUTION INFILTRATION; PERINEURAL at 07:29

## 2021-03-12 RX ADMIN — PROPOFOL INJECTABLE EMULSION 200 MCG/KG/MIN: 10 INJECTION, EMULSION INTRAVENOUS at 07:29

## 2021-03-12 RX ADMIN — SODIUM CHLORIDE, POTASSIUM CHLORIDE, SODIUM LACTATE AND CALCIUM CHLORIDE 30 ML/HR: 600; 310; 30; 20 INJECTION, SOLUTION INTRAVENOUS at 06:47

## 2021-03-12 RX ADMIN — PROPOFOL 100 MG: 10 INJECTION, EMULSION INTRAVENOUS at 07:29

## 2021-03-12 NOTE — H&P
No chief complaint on file.      HPI  gerd  BRBPR    Review of Systems     Problem List:    Patient Active Problem List   Diagnosis   • Thrombophlebitis leg   • Rectal bleeding   • Anemia   • Diarrhea   • Gastroesophageal reflux disease       Medical History:    Past Medical History:   Diagnosis Date   • Abdominal pain of multiple sites    • Allergic rhinitis    • AMEYA positive    • Blurred vision, bilateral    • Depression    • Diarrhea    • Esophagitis 2016   • Fatigue    • Gastric ulcer    • GERD (gastroesophageal reflux disease)    • Headache    • High blood pressure    • Hypertension    • Leg cramps    • Leg wound, right    • Mitral valve prolapse    • Multiple joint pain    • Overweight    • Peptic ulceration    • PONV (postoperative nausea and vomiting)    • RA (rheumatoid arthritis) (CMS/[facility])    • Superficial phlebitis     in left ankle    • Tattoos     permanent makeup         Social History:    Social History     Socioeconomic History   • Marital status:      Spouse name: Jose   • Number of children: Not on file   • Years of education: High school   • Highest education level: Not on file   Tobacco Use   • Smoking status: Former Smoker     Packs/day: 1.00     Years: 22.00     Pack years: 22.00     Types: Cigarettes     Quit date:      Years since quittin.2   • Smokeless tobacco: Never Used   Substance and Sexual Activity   • Alcohol use: Yes     Comment: Socially    • Drug use: No   • Sexual activity: Defer       Family History:   Family History   Problem Relation Age of Onset   • Cancer Father         kidney   • Hypertension Mother    • Diabetes Maternal Grandmother    • Colon polyps Brother    • Colon cancer Neg Hx        Surgical History:   Past Surgical History:   Procedure Laterality Date   • ABDOMINOPLASTY N/A 2014    Due to appendectomy site having staph infection-Dr. Keagan Yip   • APPENDECTOMY N/A     Dr. Henry   • COLONOSCOPY  2018   • ELBOW ARTHROPLASTY     •  ENDOSCOPY N/A 2016    Dr. Rajesh Loco    • FLEXIBLE SIGMOIDOSCOPY N/A 2016    Dr. Rajesh Loco   • HEMORRHOIDECTOMY N/A 2009    Dr. Saba   • HIP BIPOLAR REPLACEMENT     • KNEE ARTHROSCOPY W/ MENISCECTOMY Left 2004    Dr. Rice   • KNEE MENISCECTOMY Right 2007    Dr. Rice   • SUBTOTAL HYSTERECTOMY Bilateral 1988    Dr. Sanford. Not due to cancer. Robotic-assisted    • TUBAL ABDOMINAL LIGATION Bilateral    • UPPER GASTROINTESTINAL ENDOSCOPY N/A 10/20/2016    UGI with biopsy. Normal esophagus: injected with botulinum toxin, Erythematous mucoas in the antrum: biopsied, normal duodenum-Dr. Rajesh Loco         Current Facility-Administered Medications:   •  lactated ringers infusion, 30 mL/hr, Intravenous, Continuous PRN, Lashell, Pauly, APRN, Last Rate: 30 mL/hr at 03/12/21 0647, 30 mL/hr at 03/12/21 0647  •  sodium chloride 0.9 % flush 10 mL, 10 mL, Intravenous, PRN, Lashell, Pauly, APRN  •  sodium chloride 0.9 % flush 3 mL, 3 mL, Intravenous, Q12H, Lashell, Pauly, APRN    Allergies:   Allergies   Allergen Reactions   • Arava [Leflunomide] Other (See Comments)     Altered Glucose Levels   • Celebrex [Celecoxib] Other (See Comments)     Passed Out    • Naproxen GI Intolerance     All NSAIDS    • Plaquenil [Hydroxychloroquine Sulfate] Mental Status Change   • Prednisone Hives and Rash        The following portions of the patient's history were reviewed by me and updated as appropriate: review of systems, allergies, current medications, past family history, past medical history, past social history, past surgical history and problem list.    Vitals:    03/12/21 0633   BP: 140/72   Pulse: 81   Resp: 18   Temp: 97.7 °F (36.5 °C)   SpO2: 100%       PHYSICAL EXAM:    CONSTITUTIONAL:  today's vital signs reviewed by me  GASTROINTESTINAL: abdomen is soft nontender nondistended with normal active bowel sounds, no masses are appreciated    Assessment/ Plan  GERD  BRBPR    egd and colonosocpy     Risks and benefits  as well as alternatives to endoscopic evaluation were explained to the patient and they voiced understanding and wish to proceed.  These risks include but are not limited to the risk of bleeding, perforation, adverse reaction to sedation, and missed lesions.  The patient was given the opportunity to ask questions prior to the endoscopic procedure.

## 2021-03-12 NOTE — ANESTHESIA POSTPROCEDURE EVALUATION
Patient: Patsy Tobin    Procedure Summary     Date: 03/12/21 Room / Location: [facility]  / [facility]    Anesthesia Start: 0724 Anesthesia Stop: 0812    Procedures:       ESOPHAGOGASTRODUODENOSCOPY (N/A )      COLONOSCOPY (N/A ) Diagnosis:       Rectal bleeding      Anemia, unspecified type      Diarrhea, unspecified type      Gastroesophageal reflux disease, unspecified whether esophagitis present      (Rectal bleeding [K62.5])      (Anemia, unspecified type [D64.9])      (Diarrhea, unspecified type [R19.7])      (Gastroesophageal reflux disease, unspecified whether esophagitis present [K21.9])    Surgeons: Rajesh Loco MD Provider: Mohamud Karimi MD    Anesthesia Type: MAC ASA Status: 2          Anesthesia Type: MAC    Vitals  Vitals Value Taken Time   BP 98/49 03/12/21 0807   Temp 36.9 °C (98.4 °F) 03/12/21 0807   Pulse 68 03/12/21 0807   Resp 18 03/12/21 0807   SpO2 99 % 03/12/21 0807           Post Anesthesia Care and Evaluation    Patient location during evaluation: bedside  Patient participation: complete - patient participated  Level of consciousness: awake  Pain management: adequate  Airway patency: patent  Anesthetic complications: No anesthetic complications  PONV Status: none  Cardiovascular status: acceptable  Respiratory status: acceptable  Hydration status: acceptable  Post Neuraxial Block status: Motor and sensory function returned to baseline

## 2021-03-12 NOTE — ANESTHESIA PREPROCEDURE EVALUATION
Anesthesia Evaluation     Patient summary reviewed and Nursing notes reviewed   history of anesthetic complications: PONV  NPO Solid Status: > 8 hours  NPO Liquid Status: > 2 hours           Airway   Mallampati: I  TM distance: >3 FB  Neck ROM: full  No difficulty expected  Dental - normal exam     Pulmonary - negative pulmonary ROS and normal exam   Cardiovascular - normal exam  Exercise tolerance: good (4-7 METS)    (+) hypertension less than 2 medications,       Neuro/Psych  (+) headaches,     GI/Hepatic/Renal/Endo    (+)  GERD, PUD, GI bleeding lower resolved,     Musculoskeletal     Abdominal  - normal exam    Bowel sounds: normal.   Substance History - negative use     OB/GYN negative ob/gyn ROS         Other   arthritis,                      Anesthesia Plan    ASA 2     MAC       Anesthetic plan, all risks, benefits, and alternatives have been provided, discussed and informed consent has been obtained with: patient.

## 2021-03-15 LAB
CYTO UR: NORMAL
LAB AP CASE REPORT: NORMAL
LAB AP CLINICAL INFORMATION: NORMAL
PATH REPORT.FINAL DX SPEC: NORMAL
PATH REPORT.GROSS SPEC: NORMAL

## 2021-03-19 ENCOUNTER — BULK ORDERING (OUTPATIENT)
Dept: CASE MANAGEMENT | Facility: OTHER | Age: 66
End: 2021-03-19

## 2021-03-19 DIAGNOSIS — Z23 IMMUNIZATION DUE: ICD-10-CM

## 2021-03-30 NOTE — PROGRESS NOTES
"Chief Complaint   Patient presents with   • Follow-up   • Med Management           History of Present Illness  Patient is a 65-year-old female who presents today for follow-up. She has a history of GERD, dysphagia with prior abnormal esophageal manometry and Botox injection, also prior surgical consultation, history of IBS-D.    She had an EGD and colonoscopy March 12, 2021.  EGD with few gastric polyps consistent with fundic gland polyps, otherwise normal.  Colonoscopy with 1 adenomatous polyp, diverticulosis, internal hemorrhoids, otherwise normal.    Patient presents today for follow-up.  Prior to her EGD and colonoscopy she experienced acute onset of rectal bleeding with bloody diarrhea.  This resolved and has not recurred.    Patient reports she has been having around 4-5 loose bowel movements per day.  She is not having diarrhea but does not feel that her bowel habits have totally normalized since recent colonoscopy.  She denies any significant abdominal pain or cramping at this time.    She continues on omeprazole for GERD.  Reports symptoms are well controlled.  She reports occasional issues with dysphagia that she work for which she will use hyoscyamine with improvement in symptoms.  Overall feels symptoms are stable, controlled, and occurring relatively infrequently at this time.    Review of Systems   Constitutional: Negative for appetite change and unexpected weight change.   Gastrointestinal: Negative for abdominal pain and blood in stool.         Result Review :       COLONOSCOPY (03/12/2021 07:16)   UPPER GI ENDOSCOPY (03/12/2021 07:17)   Tissue Pathology Exam (03/12/2021 07:32)   Office Visit with Latonia Carbone APRN (06/03/2020)   Office Visit with Pauly Lobo APRN (02/26/2021)       Vital Signs:   /72   Pulse 65   Temp 97.1 °F (36.2 °C)   Ht 170.2 cm (67.01\")   Wt 87.5 kg (193 lb)   SpO2 100%   BMI 30.22 kg/m²     Body mass index is 30.22 kg/m².     Physical Exam  Vitals " reviewed.   Constitutional:       General: She is not in acute distress.     Appearance: She is well-developed.   HENT:      Head: Normocephalic and atraumatic.   Pulmonary:      Effort: Pulmonary effort is normal. No respiratory distress.   Abdominal:      General: Abdomen is flat. Bowel sounds are normal.      Tenderness: There is abdominal tenderness in the left upper quadrant.   Skin:     General: Skin is dry.      Coloration: Skin is not pale.   Neurological:      Mental Status: She is alert and oriented to person, place, and time.   Psychiatric:         Thought Content: Thought content normal.           Assessment and Plan    Diagnoses and all orders for this visit:    1. Irritable bowel syndrome with diarrhea (Primary)    2. Esophageal dysphagia    3. Gastroesophageal reflux disease without esophagitis    Other orders  -     riFAXIMin (Xifaxan) 550 MG tablet; Take 1 tablet by mouth 3 (Three) Times a Day for 14 days.  Dispense: 42 tablet; Refill: 2  -     hyoscyamine (LEVSIN) 0.125 MG SL tablet; Take 1 tablet by mouth Every 6 (Six) Hours As Needed (esophageal spasm).  Dispense: 60 tablet; Refill: 2         Discussion  EGD and colonoscopy findings reviewed at today's office visit.  No bleeding noted on colonoscopy.  Discussed with patient today bleeding episode may have been secondary to diverticular bleed or possibly acute colitis however no indication of colitis on recent colonoscopy.  As symptoms have resolved, no further treatment needed at this time.  Patient does report some persistent loose stools, recommended treatment with Xifaxan for IBS-D.          Follow Up   Return in about 1 year (around 3/31/2022).    Patient Instructions   For IBS-D, complete course of Xifaxan as prescribed.  Prescription sent to pharmacy.    For dysphagia/esophageal spasm, may continue to use hyoscyamine as needed.  Refill provided.    For GERD, continue omeprazole daily as prescribed.    For history of colon polyps,  colonoscopy recommended for surveillance at 5-year interval, due March 2026.    Call for any new or worsening symptoms or failure to improve.

## 2021-03-31 ENCOUNTER — OFFICE VISIT (OUTPATIENT)
Dept: GASTROENTEROLOGY | Facility: CLINIC | Age: 66
End: 2021-03-31

## 2021-03-31 VITALS
HEIGHT: 67 IN | TEMPERATURE: 97.1 F | HEART RATE: 65 BPM | DIASTOLIC BLOOD PRESSURE: 72 MMHG | OXYGEN SATURATION: 100 % | BODY MASS INDEX: 30.29 KG/M2 | WEIGHT: 193 LBS | SYSTOLIC BLOOD PRESSURE: 118 MMHG

## 2021-03-31 DIAGNOSIS — R13.19 ESOPHAGEAL DYSPHAGIA: ICD-10-CM

## 2021-03-31 DIAGNOSIS — K58.0 IRRITABLE BOWEL SYNDROME WITH DIARRHEA: Primary | ICD-10-CM

## 2021-03-31 DIAGNOSIS — K21.9 GASTROESOPHAGEAL REFLUX DISEASE WITHOUT ESOPHAGITIS: ICD-10-CM

## 2021-03-31 PROCEDURE — 99214 OFFICE O/P EST MOD 30 MIN: CPT | Performed by: NURSE PRACTITIONER

## 2021-03-31 RX ORDER — AMOXICILLIN 500 MG/1
CAPSULE ORAL
COMMUNITY
Start: 2021-03-25 | End: 2021-10-28

## 2021-03-31 NOTE — PATIENT INSTRUCTIONS
For IBS-D, complete course of Xifaxan as prescribed.  Prescription sent to pharmacy.    For dysphagia/esophageal spasm, may continue to use hyoscyamine as needed.  Refill provided.    For GERD, continue omeprazole daily as prescribed.    For history of colon polyps, colonoscopy recommended for surveillance at 5-year interval, due March 2026.    Call for any new or worsening symptoms or failure to improve.

## 2021-05-27 RX ORDER — OMEPRAZOLE 40 MG/1
CAPSULE, DELAYED RELEASE ORAL
Qty: 90 CAPSULE | Refills: 0 | Status: SHIPPED | OUTPATIENT
Start: 2021-05-27 | End: 2021-09-20

## 2021-09-20 RX ORDER — OMEPRAZOLE 40 MG/1
CAPSULE, DELAYED RELEASE ORAL
Qty: 90 CAPSULE | Refills: 0 | Status: SHIPPED | OUTPATIENT
Start: 2021-09-20 | End: 2021-12-23

## 2021-10-28 ENCOUNTER — OFFICE VISIT (OUTPATIENT)
Dept: CARDIOLOGY | Facility: CLINIC | Age: 66
End: 2021-10-28

## 2021-10-28 VITALS
DIASTOLIC BLOOD PRESSURE: 104 MMHG | HEART RATE: 65 BPM | BODY MASS INDEX: 30.13 KG/M2 | SYSTOLIC BLOOD PRESSURE: 172 MMHG | WEIGHT: 192 LBS | HEIGHT: 67 IN

## 2021-10-28 DIAGNOSIS — I10 ESSENTIAL HYPERTENSION: ICD-10-CM

## 2021-10-28 DIAGNOSIS — E78.2 MIXED HYPERLIPIDEMIA: ICD-10-CM

## 2021-10-28 DIAGNOSIS — I47.1 PAROXYSMAL SVT (SUPRAVENTRICULAR TACHYCARDIA) (HCC): ICD-10-CM

## 2021-10-28 DIAGNOSIS — R06.02 SHORTNESS OF BREATH: Primary | ICD-10-CM

## 2021-10-28 PROCEDURE — 99204 OFFICE O/P NEW MOD 45 MIN: CPT | Performed by: INTERNAL MEDICINE

## 2021-10-28 RX ORDER — MELOXICAM 15 MG/1
15 TABLET ORAL DAILY
COMMUNITY
End: 2022-04-04

## 2021-10-28 RX ORDER — FLUTICASONE PROPIONATE 50 MCG
2 SPRAY, SUSPENSION (ML) NASAL DAILY
COMMUNITY

## 2021-10-28 RX ORDER — METOPROLOL SUCCINATE 25 MG/1
25 TABLET, EXTENDED RELEASE ORAL DAILY
Qty: 90 TABLET | Refills: 3 | Status: SHIPPED | OUTPATIENT
Start: 2021-10-28 | End: 2022-11-09

## 2021-10-28 RX ORDER — CALCIUM POLYCARBOPHIL 625 MG
625 TABLET ORAL DAILY
COMMUNITY
End: 2021-12-07

## 2021-11-23 ENCOUNTER — HOSPITAL ENCOUNTER (OUTPATIENT)
Dept: NUCLEAR MEDICINE | Facility: HOSPITAL | Age: 66
Discharge: HOME | End: 2021-11-23

## 2021-11-23 DIAGNOSIS — R06.02 SHORTNESS OF BREATH: ICD-10-CM

## 2021-11-23 PROCEDURE — A9502 TC99M TETROFOSMIN: HCPCS | Performed by: INTERNAL MEDICINE

## 2021-11-23 PROCEDURE — 25010000002 REGADENOSON 0.4 MG/5ML SOLUTION: Performed by: INTERNAL MEDICINE

## 2021-11-23 PROCEDURE — 0 TECHNETIUM TETROFOSMIN KIT: Performed by: INTERNAL MEDICINE

## 2021-11-23 PROCEDURE — 93018 CV STRESS TEST I&R ONLY: CPT | Performed by: INTERNAL MEDICINE

## 2021-11-23 PROCEDURE — 78452 HT MUSCLE IMAGE SPECT MULT: CPT

## 2021-11-23 PROCEDURE — 78452 HT MUSCLE IMAGE SPECT MULT: CPT | Performed by: INTERNAL MEDICINE

## 2021-11-23 PROCEDURE — 93016 CV STRESS TEST SUPVJ ONLY: CPT | Performed by: NURSE PRACTITIONER

## 2021-11-23 PROCEDURE — 93017 CV STRESS TEST TRACING ONLY: CPT

## 2021-11-23 RX ADMIN — TETROFOSMIN 1 DOSE: 1.38 INJECTION, POWDER, LYOPHILIZED, FOR SOLUTION INTRAVENOUS at 11:50

## 2021-11-23 RX ADMIN — REGADENOSON 0.4 MG: 0.08 INJECTION, SOLUTION INTRAVENOUS at 11:50

## 2021-11-23 RX ADMIN — TETROFOSMIN 1 DOSE: 1.38 INJECTION, POWDER, LYOPHILIZED, FOR SOLUTION INTRAVENOUS at 09:32

## 2021-11-24 LAB
BH CV IMMEDIATE POST RECOVERY TECH DATA SYMPTOMS: NORMAL
BH CV IMMEDIATE POST TECH DATA BLOOD PRESSURE: NORMAL MMHG
BH CV IMMEDIATE POST TECH DATA HEART RATE: 109 BPM
BH CV IMMEDIATE POST TECH DATA OXYGEN SATS: 97 %
BH CV REST NUCLEAR ISOTOPE DOSE: 9.5 MCI
BH CV SIX MINUTE RECOVERY TECH DATA BLOOD PRESSURE: NORMAL
BH CV SIX MINUTE RECOVERY TECH DATA HEART RATE: 86 BPM
BH CV SIX MINUTE RECOVERY TECH DATA OXYGEN SATURATION: 96 %
BH CV SIX MINUTE RECOVERY TECH DATA SYMPTOMS: NORMAL
BH CV STRESS BP STAGE 1: NORMAL
BH CV STRESS COMMENTS STAGE 1: NORMAL
BH CV STRESS DOSE REGADENOSON STAGE 1: 0.4
BH CV STRESS DURATION MIN STAGE 1: 0
BH CV STRESS DURATION SEC STAGE 1: 10
BH CV STRESS HR STAGE 1: 84
BH CV STRESS NUCLEAR ISOTOPE DOSE: 33.9 MCI
BH CV STRESS O2 STAGE 1: 96
BH CV STRESS PROTOCOL 1: NORMAL
BH CV STRESS RECOVERY BP: NORMAL MMHG
BH CV STRESS RECOVERY HR: 86 BPM
BH CV STRESS RECOVERY O2: 96 %
BH CV STRESS STAGE 1: 1
BH CV THREE MINUTE POST TECH DATA BLOOD PRESSURE: NORMAL MMHG
BH CV THREE MINUTE POST TECH DATA HEART RATE: 90 BPM
BH CV THREE MINUTE POST TECH DATA OXYGEN SATURATION: 96 %
BH CV THREE MINUTE RECOVERY TECH DATA SYMPTOM: NORMAL
LV EF NUC BP: 74 %
MAXIMAL PREDICTED HEART RATE: 153 BPM
PERCENT MAX PREDICTED HR: 71.24 %
STRESS BASELINE BP: NORMAL MMHG
STRESS BASELINE HR: 69 BPM
STRESS O2 SAT REST: 94 %
STRESS PERCENT HR: 84 %
STRESS POST O2 SAT PEAK: 98 %
STRESS POST PEAK BP: NORMAL MMHG
STRESS POST PEAK HR: 109 BPM
STRESS TARGET HR: 130 BPM

## 2021-11-25 ENCOUNTER — APPOINTMENT (OUTPATIENT)
Dept: ULTRASOUND IMAGING | Facility: HOSPITAL | Age: 66
End: 2021-11-25

## 2021-11-25 ENCOUNTER — APPOINTMENT (OUTPATIENT)
Dept: CT IMAGING | Facility: HOSPITAL | Age: 66
End: 2021-11-25

## 2021-11-25 ENCOUNTER — HOSPITAL ENCOUNTER (EMERGENCY)
Facility: HOSPITAL | Age: 66
Discharge: HOME | End: 2021-11-26
Attending: EMERGENCY MEDICINE | Admitting: EMERGENCY MEDICINE

## 2021-11-25 ENCOUNTER — APPOINTMENT (OUTPATIENT)
Dept: GENERAL RADIOLOGY | Facility: HOSPITAL | Age: 66
End: 2021-11-25

## 2021-11-25 DIAGNOSIS — J18.9 PNEUMONIA OF RIGHT UPPER LOBE DUE TO INFECTIOUS ORGANISM: Primary | ICD-10-CM

## 2021-11-25 DIAGNOSIS — J90 PLEURAL EFFUSION: ICD-10-CM

## 2021-11-25 LAB
ALBUMIN SERPL-MCNC: 4.4 G/DL (ref 3.5–5.2)
ALBUMIN/GLOB SERPL: 1.5 G/DL
ALP SERPL-CCNC: 69 U/L (ref 39–117)
ALT SERPL W P-5'-P-CCNC: 17 U/L (ref 1–33)
ANION GAP SERPL CALCULATED.3IONS-SCNC: 14.3 MMOL/L (ref 5–15)
AST SERPL-CCNC: 15 U/L (ref 1–32)
BACTERIA UR QL AUTO: ABNORMAL /HPF
BASOPHILS # BLD AUTO: 0.07 10*3/MM3 (ref 0–0.2)
BASOPHILS NFR BLD AUTO: 0.6 % (ref 0–1.5)
BILIRUB SERPL-MCNC: 0.3 MG/DL (ref 0–1.2)
BILIRUB UR QL STRIP: NEGATIVE
BUN SERPL-MCNC: 32 MG/DL (ref 8–23)
BUN/CREAT SERPL: 15.4 (ref 7–25)
CALCIUM SPEC-SCNC: 9 MG/DL (ref 8.6–10.5)
CHLORIDE SERPL-SCNC: 107 MMOL/L (ref 98–107)
CLARITY UR: CLEAR
CO2 SERPL-SCNC: 19.7 MMOL/L (ref 22–29)
COLOR UR: YELLOW
CREAT SERPL-MCNC: 2.08 MG/DL (ref 0.57–1)
DEPRECATED RDW RBC AUTO: 44.3 FL (ref 37–54)
EOSINOPHIL # BLD AUTO: 0.25 10*3/MM3 (ref 0–0.4)
EOSINOPHIL NFR BLD AUTO: 2 % (ref 0.3–6.2)
ERYTHROCYTE [DISTWIDTH] IN BLOOD BY AUTOMATED COUNT: 13.6 % (ref 12.3–15.4)
GFR SERPL CREATININE-BSD FRML MDRD: 24 ML/MIN/1.73
GLOBULIN UR ELPH-MCNC: 3 GM/DL
GLUCOSE SERPL-MCNC: 100 MG/DL (ref 65–99)
GLUCOSE UR STRIP-MCNC: NEGATIVE MG/DL
HCT VFR BLD AUTO: 34.3 % (ref 34–46.6)
HGB BLD-MCNC: 11.5 G/DL (ref 12–15.9)
HGB UR QL STRIP.AUTO: ABNORMAL
HOLD SPECIMEN: NORMAL
HOLD SPECIMEN: NORMAL
HYALINE CASTS UR QL AUTO: ABNORMAL /LPF
IMM GRANULOCYTES # BLD AUTO: 0.07 10*3/MM3 (ref 0–0.05)
IMM GRANULOCYTES NFR BLD AUTO: 0.6 % (ref 0–0.5)
KETONES UR QL STRIP: NEGATIVE
LEUKOCYTE ESTERASE UR QL STRIP.AUTO: ABNORMAL
LIPASE SERPL-CCNC: 29 U/L (ref 13–60)
LYMPHOCYTES # BLD AUTO: 2.3 10*3/MM3 (ref 0.7–3.1)
LYMPHOCYTES NFR BLD AUTO: 18.5 % (ref 19.6–45.3)
MCH RBC QN AUTO: 30.1 PG (ref 26.6–33)
MCHC RBC AUTO-ENTMCNC: 33.5 G/DL (ref 31.5–35.7)
MCV RBC AUTO: 89.8 FL (ref 79–97)
MONOCYTES # BLD AUTO: 1.71 10*3/MM3 (ref 0.1–0.9)
MONOCYTES NFR BLD AUTO: 13.8 % (ref 5–12)
NEUTROPHILS NFR BLD AUTO: 64.5 % (ref 42.7–76)
NEUTROPHILS NFR BLD AUTO: 8 10*3/MM3 (ref 1.7–7)
NITRITE UR QL STRIP: NEGATIVE
NRBC BLD AUTO-RTO: 0 /100 WBC (ref 0–0.2)
PH UR STRIP.AUTO: <=5 [PH] (ref 5–8)
PLATELET # BLD AUTO: 227 10*3/MM3 (ref 140–450)
PMV BLD AUTO: 11 FL (ref 6–12)
POTASSIUM SERPL-SCNC: 4.5 MMOL/L (ref 3.5–5.2)
PROT SERPL-MCNC: 7.4 G/DL (ref 6–8.5)
PROT UR QL STRIP: NEGATIVE
RBC # BLD AUTO: 3.82 10*6/MM3 (ref 3.77–5.28)
RBC # UR STRIP: ABNORMAL /HPF
REF LAB TEST METHOD: ABNORMAL
SODIUM SERPL-SCNC: 141 MMOL/L (ref 136–145)
SP GR UR STRIP: 1.01 (ref 1–1.03)
SQUAMOUS #/AREA URNS HPF: ABNORMAL /HPF
UROBILINOGEN UR QL STRIP: ABNORMAL
WBC # UR STRIP: ABNORMAL /HPF
WBC NRBC COR # BLD: 12.4 10*3/MM3 (ref 3.4–10.8)
WHOLE BLOOD HOLD SPECIMEN: NORMAL
WHOLE BLOOD HOLD SPECIMEN: NORMAL

## 2021-11-25 PROCEDURE — 96374 THER/PROPH/DIAG INJ IV PUSH: CPT

## 2021-11-25 PROCEDURE — 80053 COMPREHEN METABOLIC PANEL: CPT | Performed by: PHYSICIAN ASSISTANT

## 2021-11-25 PROCEDURE — 83690 ASSAY OF LIPASE: CPT | Performed by: PHYSICIAN ASSISTANT

## 2021-11-25 PROCEDURE — 71045 X-RAY EXAM CHEST 1 VIEW: CPT

## 2021-11-25 PROCEDURE — 74176 CT ABD & PELVIS W/O CONTRAST: CPT

## 2021-11-25 PROCEDURE — 99284 EMERGENCY DEPT VISIT MOD MDM: CPT

## 2021-11-25 PROCEDURE — 36415 COLL VENOUS BLD VENIPUNCTURE: CPT

## 2021-11-25 PROCEDURE — 71250 CT THORAX DX C-: CPT

## 2021-11-25 PROCEDURE — 76705 ECHO EXAM OF ABDOMEN: CPT

## 2021-11-25 PROCEDURE — 25010000002 ONDANSETRON PER 1 MG: Performed by: EMERGENCY MEDICINE

## 2021-11-25 PROCEDURE — 96375 TX/PRO/DX INJ NEW DRUG ADDON: CPT

## 2021-11-25 PROCEDURE — 81001 URINALYSIS AUTO W/SCOPE: CPT | Performed by: PHYSICIAN ASSISTANT

## 2021-11-25 PROCEDURE — 85025 COMPLETE CBC W/AUTO DIFF WBC: CPT | Performed by: PHYSICIAN ASSISTANT

## 2021-11-25 PROCEDURE — 25010000002 HYDROMORPHONE 1 MG/ML SOLUTION: Performed by: EMERGENCY MEDICINE

## 2021-11-25 RX ORDER — ONDANSETRON 2 MG/ML
4 INJECTION INTRAMUSCULAR; INTRAVENOUS ONCE
Status: COMPLETED | OUTPATIENT
Start: 2021-11-25 | End: 2021-11-25

## 2021-11-25 RX ORDER — SODIUM CHLORIDE 0.9 % (FLUSH) 0.9 %
10 SYRINGE (ML) INJECTION AS NEEDED
Status: DISCONTINUED | OUTPATIENT
Start: 2021-11-25 | End: 2021-11-26 | Stop reason: HOSPADM

## 2021-11-25 RX ADMIN — ONDANSETRON 4 MG: 2 INJECTION INTRAMUSCULAR; INTRAVENOUS at 22:37

## 2021-11-25 RX ADMIN — HYDROMORPHONE HYDROCHLORIDE 1 MG: 1 INJECTION, SOLUTION INTRAMUSCULAR; INTRAVENOUS; SUBCUTANEOUS at 22:38

## 2021-11-26 VITALS
SYSTOLIC BLOOD PRESSURE: 145 MMHG | HEART RATE: 91 BPM | OXYGEN SATURATION: 95 % | RESPIRATION RATE: 20 BRPM | DIASTOLIC BLOOD PRESSURE: 80 MMHG | TEMPERATURE: 98.6 F

## 2021-11-26 RX ORDER — HYDROCODONE BITARTRATE AND ACETAMINOPHEN 7.5; 325 MG/1; MG/1
1 TABLET ORAL ONCE
Status: COMPLETED | OUTPATIENT
Start: 2021-11-26 | End: 2021-11-26

## 2021-11-26 RX ORDER — AMOXICILLIN AND CLAVULANATE POTASSIUM 875; 125 MG/1; MG/1
1 TABLET, FILM COATED ORAL 2 TIMES DAILY
Qty: 20 TABLET | Refills: 0 | Status: SHIPPED | OUTPATIENT
Start: 2021-11-26 | End: 2021-12-07

## 2021-11-26 RX ORDER — AMOXICILLIN AND CLAVULANATE POTASSIUM 875; 125 MG/1; MG/1
1 TABLET, FILM COATED ORAL ONCE
Status: COMPLETED | OUTPATIENT
Start: 2021-11-26 | End: 2021-11-26

## 2021-11-26 RX ORDER — AMOXICILLIN AND CLAVULANATE POTASSIUM 875; 125 MG/1; MG/1
1 TABLET, FILM COATED ORAL 2 TIMES DAILY
Qty: 20 TABLET | Refills: 0 | Status: SHIPPED | OUTPATIENT
Start: 2021-11-26 | End: 2021-11-26 | Stop reason: SDUPTHER

## 2021-11-26 RX ORDER — HYDROCODONE BITARTRATE AND ACETAMINOPHEN 5; 325 MG/1; MG/1
1 TABLET ORAL EVERY 6 HOURS PRN
Qty: 12 TABLET | Refills: 0 | Status: SHIPPED | OUTPATIENT
Start: 2021-11-26 | End: 2021-12-10 | Stop reason: HOSPADM

## 2021-11-26 RX ADMIN — HYDROCODONE BITARTRATE AND ACETAMINOPHEN 1 TABLET: 7.5; 325 TABLET ORAL at 02:30

## 2021-11-26 RX ADMIN — AMOXICILLIN AND CLAVULANATE POTASSIUM 1 TABLET: 875; 125 TABLET, FILM COATED ORAL at 02:56

## 2021-11-26 NOTE — ED PROVIDER NOTES
MD ATTESTATION NOTE    The YOLA and I have discussed this patient's history, physical exam, and treatment plan.  I have reviewed the documentation and personally had a face to face interaction with the patient. I affirm the documentation and agree with the treatment and plan.  The attached note describes my personal findings.      Patsy Tobin is a 66 y.o. female who presents to the ED c/o pain under right breast began a few hours prior to arrival.  Patient has had a cough for the past couple days.  States some mild shortness of breath.  Nothing really changes the pain.  No fevers or chills.  History of DVT.      On exam:  No acute distress, normocephalic atraumatic, supple nontender, regular rate and rhythm, clear to auscultation bilaterally, soft nontender nondistended, moving all extremities well    Labs  Recent Results (from the past 24 hour(s))   Green Top (Gel)    Collection Time: 11/25/21 10:09 PM   Result Value Ref Range    Extra Tube Hold for add-ons.    Lavender Top    Collection Time: 11/25/21 10:09 PM   Result Value Ref Range    Extra Tube hold for add-on    Gold Top - SST    Collection Time: 11/25/21 10:09 PM   Result Value Ref Range    Extra Tube Hold for add-ons.    Light Blue Top    Collection Time: 11/25/21 10:09 PM   Result Value Ref Range    Extra Tube hold for add-on    Comprehensive Metabolic Panel    Collection Time: 11/25/21 10:09 PM    Specimen: Blood   Result Value Ref Range    Glucose 100 (H) 65 - 99 mg/dL    BUN 32 (H) 8 - 23 mg/dL    Creatinine 2.08 (H) 0.57 - 1.00 mg/dL    Sodium 141 136 - 145 mmol/L    Potassium 4.5 3.5 - 5.2 mmol/L    Chloride 107 98 - 107 mmol/L    CO2 19.7 (L) 22.0 - 29.0 mmol/L    Calcium 9.0 8.6 - 10.5 mg/dL    Total Protein 7.4 6.0 - 8.5 g/dL    Albumin 4.40 3.50 - 5.20 g/dL    ALT (SGPT) 17 1 - 33 U/L    AST (SGOT) 15 1 - 32 U/L    Alkaline Phosphatase 69 39 - 117 U/L    Total Bilirubin 0.3 0.0 - 1.2 mg/dL    eGFR Non African Amer 24 (L) >60 mL/min/1.73     Globulin 3.0 gm/dL    A/G Ratio 1.5 g/dL    BUN/Creatinine Ratio 15.4 7.0 - 25.0    Anion Gap 14.3 5.0 - 15.0 mmol/L   Lipase    Collection Time: 11/25/21 10:09 PM    Specimen: Blood   Result Value Ref Range    Lipase 29 13 - 60 U/L   CBC Auto Differential    Collection Time: 11/25/21 10:09 PM    Specimen: Blood   Result Value Ref Range    WBC 12.40 (H) 3.40 - 10.80 10*3/mm3    RBC 3.82 3.77 - 5.28 10*6/mm3    Hemoglobin 11.5 (L) 12.0 - 15.9 g/dL    Hematocrit 34.3 34.0 - 46.6 %    MCV 89.8 79.0 - 97.0 fL    MCH 30.1 26.6 - 33.0 pg    MCHC 33.5 31.5 - 35.7 g/dL    RDW 13.6 12.3 - 15.4 %    RDW-SD 44.3 37.0 - 54.0 fl    MPV 11.0 6.0 - 12.0 fL    Platelets 227 140 - 450 10*3/mm3    Neutrophil % 64.5 42.7 - 76.0 %    Lymphocyte % 18.5 (L) 19.6 - 45.3 %    Monocyte % 13.8 (H) 5.0 - 12.0 %    Eosinophil % 2.0 0.3 - 6.2 %    Basophil % 0.6 0.0 - 1.5 %    Immature Grans % 0.6 (H) 0.0 - 0.5 %    Neutrophils, Absolute 8.00 (H) 1.70 - 7.00 10*3/mm3    Lymphocytes, Absolute 2.30 0.70 - 3.10 10*3/mm3    Monocytes, Absolute 1.71 (H) 0.10 - 0.90 10*3/mm3    Eosinophils, Absolute 0.25 0.00 - 0.40 10*3/mm3    Basophils, Absolute 0.07 0.00 - 0.20 10*3/mm3    Immature Grans, Absolute 0.07 (H) 0.00 - 0.05 10*3/mm3    nRBC 0.0 0.0 - 0.2 /100 WBC   Urinalysis With Microscopic If Indicated (No Culture) - Urine, Clean Catch    Collection Time: 11/25/21 10:40 PM    Specimen: Urine, Clean Catch   Result Value Ref Range    Color, UA Yellow Yellow, Straw    Appearance, UA Clear Clear    pH, UA <=5.0 5.0 - 8.0    Specific Gravity, UA 1.008 1.005 - 1.030    Glucose, UA Negative Negative    Ketones, UA Negative Negative    Bilirubin, UA Negative Negative    Blood, UA Trace (A) Negative    Protein, UA Negative Negative    Leuk Esterase, UA Moderate (2+) (A) Negative    Nitrite, UA Negative Negative    Urobilinogen, UA 0.2 E.U./dL 0.2 - 1.0 E.U./dL   Urinalysis, Microscopic Only - Urine, Clean Catch    Collection Time: 11/25/21 10:40 PM     Specimen: Urine, Clean Catch   Result Value Ref Range    RBC, UA 0-2 None Seen, 0-2 /HPF    WBC, UA 13-20 (A) None Seen, 0-2 /HPF    Bacteria, UA None Seen None Seen /HPF    Squamous Epithelial Cells, UA 0-2 None Seen, 0-2 /HPF    Hyaline Casts, UA 0-2 None Seen /LPF    Methodology Automated Microscopy        Radiology  CT Abdomen Pelvis Without Contrast    Result Date: 11/26/2021  Patient: JACKSON CORTÉS  Time Out: 00:30 Exam(s): CT ABDOMEN + PELVIS Without Contrast EXAM:   CT Abdomen and Pelvis Without Intravenous Contrast CLINICAL HISTORY:    Reason for exam: Right upper quadrant and right-sided chest pain. TECHNIQUE:   Axial computed tomography images of the abdomen and pelvis without intravenous contrast.  CTDI is 18.4 mGy and DLP is 1293.6 mGy-cm.  This CT exam was performed according to the principle of ALARA (As Low As Reasonably Achievable) by using one or more of the following dose reduction techniques: automated exposure control, adjustment of the mA and or kV according to patient size, and or use of iterative reconstruction technique. COMPARISON:   No relevant prior studies available. FINDINGS:   Lung bases:  See below.    Pleural space:  There is a small right pleural effusion and minimal right basal atelectasis.  ABDOMEN:   Liver:  Unremarkable.   Gallbladder and bile ducts:  Unremarkable.  No calcified stones.  No ductal dilation.   Pancreas:  Unremarkable.  No ductal dilation.   Spleen:  Unremarkable.  No splenomegaly.   Adrenals:  Unremarkable.  No mass.   Kidneys and ureters:  Unremarkable.  No obstructing stones.  No hydronephrosis.   Stomach and bowel:  Bowel loops are nondilated.  There is mild diverticulosis of the sigmoid colon without signs of acute diverticulitis.   No acute inflammatory process is seen involving the bowel.  PELVIS:   Appendix:  No findings to suggest acute appendicitis.   Bladder:  Unremarkable.  No stones.   Reproductive:  Unremarkable as visualized.  ABDOMEN and PELVIS:    Intraperitoneal space:  The uterus has been removed.  There is a trace amount of free fluid in the pelvis.  No free air.   Bones joints:  There is artifact from a right hip arthroplasty.  No acute fracture.  No dislocation.   Soft tissues:  Unremarkable.   Vasculature:  The aorta is moderately calcified but nondilated.   Lymph nodes:  Unremarkable.  No enlarged lymph nodes. IMPRESSION:   Bowel loops are nondilated.  There is mild diverticulosis of the sigmoid colon without signs of acute diverticulitis.  No acute inflammatory process is seen involving the bowel.    Previous hysterectomy.  There is a trace amount of free fluid in the pelvis which is nonspecific.     Electronically signed by Hugh Gayle MD on 11-26-21 at 0030    CT Chest Without Contrast Diagnostic    Result Date: 11/26/2021  Patient: JACKSON CORTÉS  Time Out: 00:17 Exam(s): CT CHEST Without Contrast EXAM:   CT Chest Without Intravenous Contrast CLINICAL HISTORY:    Reason for exam: RUQ and right sided chest pain.. TECHNIQUE:   Axial computed tomography images of the chest without intravenous contrast.  CTDI is 18.4 mGy and DLP is 1293.6 mGy-cm.  This CT exam was performed according to the principle of ALARA (As Low As Reasonably Achievable) by using one or more of the following dose reduction techniques: automated exposure control, adjustment of the mA and or kV according to patient size, and or use of iterative reconstruction technique. COMPARISON:   No relevant prior studies available. FINDINGS:   Lungs:  Right upper lobe infiltrate suspicious for pneumonia.   Pleural space:  There is a trace right pleural effusion layering 8 mm.  No pneumothorax.   Heart:  The heart is not enlarged.  Severe coronary calcification is present.  No significant pericardial effusion.   Bones joints:  Mild degenerative changes in the lower thoracic spine.  No fracture or bone lesion.  No dislocation.   Soft tissues:  Unremarkable.   Vasculature:  The thoracic aorta  is mildly calcified but nondilated.   Lymph nodes:  Unremarkable.  No enlarged lymph nodes. IMPRESSION: 1.  Right upper lobe infiltrate consistent with pneumonia. 2.  There is a trace right pleural effusion layering 8 mm.     Electronically signed by Hugh Gayle MD on 11-26-21 at 0017    US Gallbladder    Result Date: 11/25/2021  Patient: JACKSON CORTÉS  Time Out: 23:39 Exam(s): US GALLBLADDER EXAM:   US Abdomen Limited, Gallbladder CLINICAL HISTORY:    Reason for exam: RUQ pain. TECHNIQUE:   Real-time ultrasound of the right upper quadrant with image documentation. COMPARISON:   No relevant prior studies available. FINDINGS:   Liver:  The liver measures 14.7 cm with normal echotexture.  No focal liver lesion is seen.   Gallbladder:  The gallbladder is normally distended.  No gallstones, wall thickening, or surrounding fluid.  Sonographic Rodriguez sign is negative.   Common bile duct:  The common bile duct is nondilated measuring 3 mm.   Pancreas:  The pancreas is mostly obscured.   Right kidney:  The right kidney measures 9.3 x 4.8 x 4.4 cm with normal appearance.  No hydronephrosis.   Pleural space:  There is a trace right pleural effusion. IMPRESSION:   No acute findings in the right upper quadrant.     Electronically signed by Hugh Gayle MD on 11-25-21 at 2339     Medical Decision Making:  ED Course as of 11/26/21 0230   Thu Nov 25, 2021 2303 Gallbladder ultrasound unremarkable.  Given the patient's elevated white blood cell count has significant pain will obtain a CT of the chest, abdomen and pelvis without contrast given the patient's renal function further evaluate. [RC]   2304 Patient's BUN and creatinine are noted to be 32 and 2.08, her GFR is less than 24 [RC]   2306 WBC(!): 12.40 [RC]   2306 RBC: 3.82 [RC]   2306 Hemoglobin(!): 11.5 [RC]   2306 Hematocrit: 34.3 [RC]   2306 Platelets: 227 [RC]   2306 Glucose(!): 100 [RC]   2306 BUN(!): 32 [RC]   2306 Creatinine(!): 2.08 [RC]   2306 CO2(!): 19.7  [RC]   2306 Anion Gap: 14.3 [RC]   2306 ALT (SGPT): 17 [RC]   2306 AST (SGOT): 15 [RC]   2306 Alkaline Phosphatase: 69 [RC]   2306 Total Bilirubin: 0.3 [RC]      ED Course User Index  [RC] Bird Dyer III, PA           PPE: Both the patient and I wore a surgical mask throughout the entire patient encounter. I wore protective goggles.     Diagnosis  Final diagnoses:   Pneumonia of right upper lobe due to infectious organism   Recurrent right pleural effusion        Alex Goode MD  11/26/21 0230

## 2021-11-26 NOTE — ED PROVIDER NOTES
EMERGENCY DEPARTMENT ENCOUNTER    Room Number:  08/08  Date of encounter:  11/26/2021  PCP: Rigoberto Zhong MD  Historian: Patient      HPI:  Chief Complaint: Right upper quadrant pain  A complete HPI/ROS/PMH/PSH/SH/FH are unobtainable due to: Nothing    Context: Patsy Tobin is a 66 y.o. female who presents to the ED c/o right upper quadrant pain that began rather abruptly few hours prior to arrival.  Patient states she had cough prior to the onset of the pain.  Pain is described as a 10 out of 10, sharp, and the patient states it radiates to the right scapula.  Nothing makes the pain better or worse.  She cannot find a comfortable position.  She denies fever but states he is nauseated and has had a bout of nonbloody nonbilious vomiting.  He denies abnormal bowel movements.    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    PAST MEDICAL HISTORY  Active Ambulatory Problems     Diagnosis Date Noted   • Thrombophlebitis leg 04/24/2017   • Rectal bleeding 02/26/2021   • Anemia 02/26/2021   • Diarrhea 02/26/2021   • Gastroesophageal reflux disease 02/26/2021     Resolved Ambulatory Problems     Diagnosis Date Noted   • No Resolved Ambulatory Problems     Past Medical History:   Diagnosis Date   • Abdominal pain of multiple sites    • Allergic rhinitis    • AMEYA positive    • Blurred vision, bilateral    • Deep vein thrombosis (HCC)    • Depression    • Esophagitis 2016   • Fatigue    • Gastric ulcer    • GERD (gastroesophageal reflux disease)    • Headache    • High blood pressure    • Hypertension    • Leg cramps    • Leg wound, right    • Mitral valve prolapse    • Multiple joint pain    • Overweight    • Peptic ulceration    • PONV (postoperative nausea and vomiting)    • RA (rheumatoid arthritis) (HCC)    • Superficial phlebitis    • Tattoos          PAST SURGICAL HISTORY  Past Surgical History:   Procedure Laterality Date    • ABDOMINOPLASTY N/A 2014    Due to appendectomy site having staph infection-Dr. Keagan Yip   • APPENDECTOMY N/A     Dr. Henry   • COLONOSCOPY      Junaid   • COLONOSCOPY N/A 3/12/2021    Procedure: COLONOSCOPY;  Surgeon: Rajesh Loco MD;  Location: [facility];  Service: Gastroenterology;  Laterality: N/A;   • ELBOW ARTHROPLASTY     • ENDOSCOPY N/A     Dr. Rajesh Loco    • ENDOSCOPY N/A 3/12/2021    Procedure: ESOPHAGOGASTRODUODENOSCOPY;  Surgeon: Rajesh Loco MD;  Location: [facility];  Service: Gastroenterology;  Laterality: N/A;   • FLEXIBLE SIGMOIDOSCOPY N/A     Dr. Rajesh Loco   • HEMORRHOIDECTOMY N/A     Dr. Saba   • HIP BIPOLAR REPLACEMENT     • KNEE ARTHROSCOPY W/ MENISCECTOMY Left     Dr. Rice   • KNEE MENISCECTOMY Right     Dr. Rice   • SUBTOTAL HYSTERECTOMY Bilateral     Dr. Sanford. Not due to cancer. Robotic-assisted    • TUBAL ABDOMINAL LIGATION Bilateral    • UPPER GASTROINTESTINAL ENDOSCOPY N/A 10/20/2016    UGI with biopsy. Normal esophagus: injected with botulinum toxin, Erythematous mucoas in the antrum: biopsied, normal duodenum-Dr. Rajesh Loco         FAMILY HISTORY  Family History   Problem Relation Age of Onset   • Cancer Father         kidney   • Hypertension Mother    • Diabetes Maternal Grandmother    • Colon polyps Brother    • Colon cancer Neg Hx          SOCIAL HISTORY  Social History     Socioeconomic History   • Marital status:      Spouse name: Jose   • Years of education: High school   Tobacco Use   • Smoking status: Former Smoker     Packs/day: 1.00     Years: 22.00     Pack years: 22.00     Types: Cigarettes     Quit date:      Years since quittin.9   • Smokeless tobacco: Never Used   Vaping Use   • Vaping Use: Never used   Substance and Sexual Activity   • Alcohol use: Yes     Comment: Socially    • Drug use: No   • Sexual activity: Defer         ALLERGIES  Celebrex [celecoxib], Arava  [leflunomide], Duloxetine, Naproxen, and Plaquenil [hydroxychloroquine sulfate]        REVIEW OF SYSTEMS  Review of Systems   Constitutional: Negative.    HENT: Negative.    Respiratory: Negative for cough and shortness of breath.    Cardiovascular: Negative for chest pain, palpitations and leg swelling.   Gastrointestinal: Positive for abdominal pain. Negative for nausea and vomiting.   Genitourinary: Negative.    Musculoskeletal: Negative.    Skin: Negative.    Neurological: Negative.    Psychiatric/Behavioral: Negative.         All systems reviewed and negative except for those discussed in HPI.       PHYSICAL EXAM    I have reviewed the triage vital signs and nursing notes.    ED Triage Vitals [11/25/21 2149]   Temp Heart Rate Resp BP SpO2   98.6 °F (37 °C) 106 20 (!) 193/83 100 %      Temp src Heart Rate Source Patient Position BP Location FiO2 (%)   Tympanic -- Lying Right arm --       Physical Exam  GENERAL: Well-nourished, nontoxic, does appear uncomfortable and anxious  HENT: nares patent, mucous memories moist, face symmetric  EYES: no scleral icterus  CV: Slightly tachycardic without rubs murmurs gallops, no JVD, no obvious unilateral leg swelling   RESPIRATORY: normal effort, lungs CTA B air movement  ABDOMEN: Pain in the right upper quadrant  MUSCULOSKELETAL: no deformity  NEURO: alert and orient x4, moves all extremities, follows commands  SKIN: warm, dry        LAB RESULTS  Recent Results (from the past 24 hour(s))   Green Top (Gel)    Collection Time: 11/25/21 10:09 PM   Result Value Ref Range    Extra Tube Hold for add-ons.    Lavender Top    Collection Time: 11/25/21 10:09 PM   Result Value Ref Range    Extra Tube hold for add-on    Gold Top - SST    Collection Time: 11/25/21 10:09 PM   Result Value Ref Range    Extra Tube Hold for add-ons.    Light Blue Top    Collection Time: 11/25/21 10:09 PM   Result Value Ref Range    Extra Tube hold for add-on    Comprehensive Metabolic Panel    Collection  Time: 11/25/21 10:09 PM    Specimen: Blood   Result Value Ref Range    Glucose 100 (H) 65 - 99 mg/dL    BUN 32 (H) 8 - 23 mg/dL    Creatinine 2.08 (H) 0.57 - 1.00 mg/dL    Sodium 141 136 - 145 mmol/L    Potassium 4.5 3.5 - 5.2 mmol/L    Chloride 107 98 - 107 mmol/L    CO2 19.7 (L) 22.0 - 29.0 mmol/L    Calcium 9.0 8.6 - 10.5 mg/dL    Total Protein 7.4 6.0 - 8.5 g/dL    Albumin 4.40 3.50 - 5.20 g/dL    ALT (SGPT) 17 1 - 33 U/L    AST (SGOT) 15 1 - 32 U/L    Alkaline Phosphatase 69 39 - 117 U/L    Total Bilirubin 0.3 0.0 - 1.2 mg/dL    eGFR Non African Amer 24 (L) >60 mL/min/1.73    Globulin 3.0 gm/dL    A/G Ratio 1.5 g/dL    BUN/Creatinine Ratio 15.4 7.0 - 25.0    Anion Gap 14.3 5.0 - 15.0 mmol/L   Lipase    Collection Time: 11/25/21 10:09 PM    Specimen: Blood   Result Value Ref Range    Lipase 29 13 - 60 U/L   CBC Auto Differential    Collection Time: 11/25/21 10:09 PM    Specimen: Blood   Result Value Ref Range    WBC 12.40 (H) 3.40 - 10.80 10*3/mm3    RBC 3.82 3.77 - 5.28 10*6/mm3    Hemoglobin 11.5 (L) 12.0 - 15.9 g/dL    Hematocrit 34.3 34.0 - 46.6 %    MCV 89.8 79.0 - 97.0 fL    MCH 30.1 26.6 - 33.0 pg    MCHC 33.5 31.5 - 35.7 g/dL    RDW 13.6 12.3 - 15.4 %    RDW-SD 44.3 37.0 - 54.0 fl    MPV 11.0 6.0 - 12.0 fL    Platelets 227 140 - 450 10*3/mm3    Neutrophil % 64.5 42.7 - 76.0 %    Lymphocyte % 18.5 (L) 19.6 - 45.3 %    Monocyte % 13.8 (H) 5.0 - 12.0 %    Eosinophil % 2.0 0.3 - 6.2 %    Basophil % 0.6 0.0 - 1.5 %    Immature Grans % 0.6 (H) 0.0 - 0.5 %    Neutrophils, Absolute 8.00 (H) 1.70 - 7.00 10*3/mm3    Lymphocytes, Absolute 2.30 0.70 - 3.10 10*3/mm3    Monocytes, Absolute 1.71 (H) 0.10 - 0.90 10*3/mm3    Eosinophils, Absolute 0.25 0.00 - 0.40 10*3/mm3    Basophils, Absolute 0.07 0.00 - 0.20 10*3/mm3    Immature Grans, Absolute 0.07 (H) 0.00 - 0.05 10*3/mm3    nRBC 0.0 0.0 - 0.2 /100 WBC   Urinalysis With Microscopic If Indicated (No Culture) - Urine, Clean Catch    Collection Time: 11/25/21 10:40 PM     Specimen: Urine, Clean Catch   Result Value Ref Range    Color, UA Yellow Yellow, Straw    Appearance, UA Clear Clear    pH, UA <=5.0 5.0 - 8.0    Specific Gravity, UA 1.008 1.005 - 1.030    Glucose, UA Negative Negative    Ketones, UA Negative Negative    Bilirubin, UA Negative Negative    Blood, UA Trace (A) Negative    Protein, UA Negative Negative    Leuk Esterase, UA Moderate (2+) (A) Negative    Nitrite, UA Negative Negative    Urobilinogen, UA 0.2 E.U./dL 0.2 - 1.0 E.U./dL   Urinalysis, Microscopic Only - Urine, Clean Catch    Collection Time: 11/25/21 10:40 PM    Specimen: Urine, Clean Catch   Result Value Ref Range    RBC, UA 0-2 None Seen, 0-2 /HPF    WBC, UA 13-20 (A) None Seen, 0-2 /HPF    Bacteria, UA None Seen None Seen /HPF    Squamous Epithelial Cells, UA 0-2 None Seen, 0-2 /HPF    Hyaline Casts, UA 0-2 None Seen /LPF    Methodology Automated Microscopy        Ordered the above labs and independently reviewed the results.        RADIOLOGY  CT Abdomen Pelvis Without Contrast    Result Date: 11/26/2021  Patient: JACKSON CORTÉS  Time Out: 00:30 Exam(s): CT ABDOMEN + PELVIS Without Contrast EXAM:   CT Abdomen and Pelvis Without Intravenous Contrast CLINICAL HISTORY:    Reason for exam: Right upper quadrant and right-sided chest pain. TECHNIQUE:   Axial computed tomography images of the abdomen and pelvis without intravenous contrast.  CTDI is 18.4 mGy and DLP is 1293.6 mGy-cm.  This CT exam was performed according to the principle of ALARA (As Low As Reasonably Achievable) by using one or more of the following dose reduction techniques: automated exposure control, adjustment of the mA and or kV according to patient size, and or use of iterative reconstruction technique. COMPARISON:   No relevant prior studies available. FINDINGS:   Lung bases:  See below.    Pleural space:  There is a small right pleural effusion and minimal right basal atelectasis.  ABDOMEN:   Liver:  Unremarkable.   Gallbladder and  bile ducts:  Unremarkable.  No calcified stones.  No ductal dilation.   Pancreas:  Unremarkable.  No ductal dilation.   Spleen:  Unremarkable.  No splenomegaly.   Adrenals:  Unremarkable.  No mass.   Kidneys and ureters:  Unremarkable.  No obstructing stones.  No hydronephrosis.   Stomach and bowel:  Bowel loops are nondilated.  There is mild diverticulosis of the sigmoid colon without signs of acute diverticulitis.   No acute inflammatory process is seen involving the bowel.  PELVIS:   Appendix:  No findings to suggest acute appendicitis.   Bladder:  Unremarkable.  No stones.   Reproductive:  Unremarkable as visualized.  ABDOMEN and PELVIS:   Intraperitoneal space:  The uterus has been removed.  There is a trace amount of free fluid in the pelvis.  No free air.   Bones joints:  There is artifact from a right hip arthroplasty.  No acute fracture.  No dislocation.   Soft tissues:  Unremarkable.   Vasculature:  The aorta is moderately calcified but nondilated.   Lymph nodes:  Unremarkable.  No enlarged lymph nodes. IMPRESSION:   Bowel loops are nondilated.  There is mild diverticulosis of the sigmoid colon without signs of acute diverticulitis.  No acute inflammatory process is seen involving the bowel.    Previous hysterectomy.  There is a trace amount of free fluid in the pelvis which is nonspecific.     Electronically signed by Hugh Gayle MD on 11-26-21 at 0030    CT Chest Without Contrast Diagnostic    Result Date: 11/26/2021  Patient: JACKSON CORTÉS  Time Out: 00:17 Exam(s): CT CHEST Without Contrast EXAM:   CT Chest Without Intravenous Contrast CLINICAL HISTORY:    Reason for exam: RUQ and right sided chest pain.. TECHNIQUE:   Axial computed tomography images of the chest without intravenous contrast.  CTDI is 18.4 mGy and DLP is 1293.6 mGy-cm.  This CT exam was performed according to the principle of ALARA (As Low As Reasonably Achievable) by using one or more of the following dose reduction techniques:  automated exposure control, adjustment of the mA and or kV according to patient size, and or use of iterative reconstruction technique. COMPARISON:   No relevant prior studies available. FINDINGS:   Lungs:  Right upper lobe infiltrate suspicious for pneumonia.   Pleural space:  There is a trace right pleural effusion layering 8 mm.  No pneumothorax.   Heart:  The heart is not enlarged.  Severe coronary calcification is present.  No significant pericardial effusion.   Bones joints:  Mild degenerative changes in the lower thoracic spine.  No fracture or bone lesion.  No dislocation.   Soft tissues:  Unremarkable.   Vasculature:  The thoracic aorta is mildly calcified but nondilated.   Lymph nodes:  Unremarkable.  No enlarged lymph nodes. IMPRESSION: 1.  Right upper lobe infiltrate consistent with pneumonia. 2.  There is a trace right pleural effusion layering 8 mm.     Electronically signed by Hugh Gayle MD on 11-26-21 at 0017    US Gallbladder    Result Date: 11/25/2021  Patient: JACKSON CORTÉS  Time Out: 23:39 Exam(s): US GALLBLADDER EXAM:   US Abdomen Limited, Gallbladder CLINICAL HISTORY:    Reason for exam: RUQ pain. TECHNIQUE:   Real-time ultrasound of the right upper quadrant with image documentation. COMPARISON:   No relevant prior studies available. FINDINGS:   Liver:  The liver measures 14.7 cm with normal echotexture.  No focal liver lesion is seen.   Gallbladder:  The gallbladder is normally distended.  No gallstones, wall thickening, or surrounding fluid.  Sonographic Rodriguez sign is negative.   Common bile duct:  The common bile duct is nondilated measuring 3 mm.   Pancreas:  The pancreas is mostly obscured.   Right kidney:  The right kidney measures 9.3 x 4.8 x 4.4 cm with normal appearance.  No hydronephrosis.   Pleural space:  There is a trace right pleural effusion. IMPRESSION:   No acute findings in the right upper quadrant.     Electronically signed by Hugh Gayle MD on 11-25-21 at  2339      I ordered the above noted radiological studies. Reviewed by me and discussed with radiologist.  See dictation for official radiology interpretation.      PROCEDURES    Procedures      MEDICATIONS GIVEN IN ER    Medications   HYDROmorphone (DILAUDID) injection 1 mg (1 mg Intravenous Given 11/25/21 2238)   ondansetron (ZOFRAN) injection 4 mg (4 mg Intravenous Given 11/25/21 2237)   HYDROcodone-acetaminophen (NORCO) 7.5-325 MG per tablet 1 tablet (1 tablet Oral Given 11/26/21 0230)   amoxicillin-clavulanate (AUGMENTIN) 875-125 MG per tablet 1 tablet (1 tablet Oral Given 11/26/21 0256)         PROGRESS, DATA ANALYSIS, CONSULTS, AND MEDICAL DECISION MAKING    All labs have been independently reviewed by me.  All radiology studies have been reviewed by me and discussed with radiologist dictating the report.   EKG's independently viewed and interpreted by me.  Discussion below represents my analysis of pertinent findings related to patient's condition, differential diagnosis, treatment plan and final disposition.    DDx includes but is not limited to: Acute cholecystitis, pancreatitis, musculoskeletal, PE, PTX, PNA.  Further evaluate the patient by obtaining CBC, CMP, lipase, urine, portable chest x-ray, gallbladder ultrasound.    ED Course as of 11/26/21 0619   Thu Nov 25, 2021 2303 Gallbladder ultrasound unremarkable.  Given the patient's elevated white blood cell count has significant pain will obtain a CT of the chest, abdomen and pelvis without contrast given the patient's renal function further evaluate. [RC]   2304 Patient's BUN and creatinine are noted to be 32 and 2.08, her GFR is less than 24 [RC]   2306 WBC(!): 12.40 [RC]   2306 RBC: 3.82 [RC]   2306 Hemoglobin(!): 11.5 [RC]   2306 Hematocrit: 34.3 [RC]   2306 Platelets: 227 [RC]   2306 Glucose(!): 100 [RC]   2306 BUN(!): 32 [RC]   2306 Creatinine(!): 2.08 [RC]   2306 CO2(!): 19.7 [RC]   2306 Anion Gap: 14.3 [RC]   2306 ALT (SGPT): 17 [RC]   2306 AST  (SGOT): 15 [RC]   2306 Alkaline Phosphatase: 69 [RC]   2306 Total Bilirubin: 0.3 [RC]      ED Course User Index  [RC] Bird Dyer III, PA           PPE: The patient wore a surgical mask throughout the entire patient encounter. I wore an N95.    AS OF 06:19 EST VITALS:    BP - 145/80  HR - 91  TEMP - 98.6 °F (37 °C) (Tympanic)  O2 SATS - 95%        DIAGNOSIS  Final diagnoses:   Pneumonia of right upper lobe due to infectious organism   Pleural effusion-right         DISPOSITION  DISCHARGE    Patient discharged in stable condition.    Reviewed implications of results, diagnosis, meds, responsibility to follow up, warning signs and symptoms of possible worsening, potential complications and reasons to return to ER.    Patient/Family voiced understanding of above instructions.    Discussed plan for discharge, as there is no emergent indication for admission. Patient referred to primary care provider for BP management due to today's BP. Pt/family is agreeable and understands need for follow up and repeat testing.  Pt is aware that discharge does not mean that nothing is wrong but it indicates no emergency is present that requires admission and they must continue care with follow-up as given below or physician of their choice.     FOLLOW-UP  Rigoberto Zhong MD  [address] [id number] [phone]    In 5 days  For further evaluation and treatment         Medication List      New Prescriptions    amoxicillin-clavulanate 875-125 MG per tablet  Commonly known as: AUGMENTIN  Take 1 tablet by mouth 2 (Two) Times a Day.     HYDROcodone-acetaminophen 5-325 MG per tablet  Commonly known as: NORCO  Take 1 tablet by mouth Every 6 (Six) Hours As Needed for Moderate Pain .           Where to Get Your Medications      These medications were sent to [facility] [address] [place] [address] [facility] - [phone]  - [phone]   [address] [facility] [address]     Phone: [phone]   · amoxicillin-clavulanate 875-125 MG per tablet  · HYDROcodone-acetaminophen 5-325 MG per tablet                Bird Dyer III, PA  11/26/21 3931

## 2021-11-26 NOTE — DISCHARGE INSTRUCTIONS
Return to the ER with any further concerns, should your condition change/worsen, should your symptoms not be controlled with medication prescribed, or should you develop a fever.

## 2021-11-26 NOTE — ED NOTES
Pt to ER via EMS from home, pt in mask staff in PPE. Pt reports RUQ pain which radiates to back and flank, also upper chest at times. Pt denies  N/V/D no fever pain started 3 hours ago.     Sari Barrett, RN  11/25/21 2335

## 2021-12-01 ENCOUNTER — TELEPHONE (OUTPATIENT)
Dept: CARDIOLOGY | Facility: CLINIC | Age: 66
End: 2021-12-01

## 2021-12-01 NOTE — TELEPHONE ENCOUNTER
Received VM from patient requesting results of echo and stress test.    Returned call. Advised echo shows good heart function and no significant valve issues and stress test shows good blood flow and no significant valve issues. Patient recently diagnosed with PNA and started on abx and steroids. Advised we would make further recommendations after holter monitor completed.

## 2021-12-07 ENCOUNTER — HOSPITAL ENCOUNTER (INPATIENT)
Facility: HOSPITAL | Age: 66
LOS: 2 days | Discharge: HOME HEALTH | End: 2021-12-09
Attending: EMERGENCY MEDICINE | Admitting: STUDENT IN AN ORGANIZED HEALTH CARE EDUCATION/TRAINING PROGRAM

## 2021-12-07 ENCOUNTER — APPOINTMENT (OUTPATIENT)
Dept: GENERAL RADIOLOGY | Facility: HOSPITAL | Age: 66
End: 2021-12-07

## 2021-12-07 ENCOUNTER — APPOINTMENT (OUTPATIENT)
Dept: CT IMAGING | Facility: HOSPITAL | Age: 66
End: 2021-12-07

## 2021-12-07 ENCOUNTER — APPOINTMENT (OUTPATIENT)
Dept: CARDIOLOGY | Facility: HOSPITAL | Age: 66
End: 2021-12-07

## 2021-12-07 DIAGNOSIS — J96.01 ACUTE RESPIRATORY FAILURE WITH HYPOXIA (HCC): ICD-10-CM

## 2021-12-07 DIAGNOSIS — I26.99 OTHER ACUTE PULMONARY EMBOLISM, UNSPECIFIED WHETHER ACUTE COR PULMONALE PRESENT (HCC): Primary | ICD-10-CM

## 2021-12-07 DIAGNOSIS — M54.6 THORACIC BACK PAIN, UNSPECIFIED BACK PAIN LATERALITY, UNSPECIFIED CHRONICITY: ICD-10-CM

## 2021-12-07 LAB
ALBUMIN SERPL-MCNC: 4.2 G/DL (ref 3.5–5.2)
ALBUMIN/GLOB SERPL: 1.3 G/DL
ALP SERPL-CCNC: 94 U/L (ref 39–117)
ALT SERPL W P-5'-P-CCNC: 12 U/L (ref 1–33)
ANION GAP SERPL CALCULATED.3IONS-SCNC: 15.6 MMOL/L (ref 5–15)
ANION GAP SERPL CALCULATED.3IONS-SCNC: 18.4 MMOL/L (ref 5–15)
APTT PPP: 19.6 SECONDS (ref 22.2–34.2)
ARTERIAL PATENCY WRIST A: POSITIVE
AST SERPL-CCNC: 20 U/L (ref 1–32)
BASE EXCESS BLDA CALC-SCNC: -4.9 MMOL/L (ref -2–2)
BASOPHILS # BLD AUTO: 0.04 10*3/MM3 (ref 0–0.2)
BASOPHILS # BLD AUTO: 0.11 10*3/MM3 (ref 0–0.2)
BASOPHILS NFR BLD AUTO: 0.2 % (ref 0–1.5)
BASOPHILS NFR BLD AUTO: 0.5 % (ref 0–1.5)
BDY SITE: ABNORMAL
BILIRUB SERPL-MCNC: 0.4 MG/DL (ref 0–1.2)
BUN SERPL-MCNC: 36 MG/DL (ref 8–23)
BUN SERPL-MCNC: 36 MG/DL (ref 8–23)
BUN/CREAT SERPL: 22.1 (ref 7–25)
BUN/CREAT SERPL: 22.5 (ref 7–25)
CALCIUM SPEC-SCNC: 9.3 MG/DL (ref 8.6–10.5)
CALCIUM SPEC-SCNC: 9.9 MG/DL (ref 8.6–10.5)
CHLORIDE SERPL-SCNC: 109 MMOL/L (ref 98–107)
CHLORIDE SERPL-SCNC: 110 MMOL/L (ref 98–107)
CK MB SERPL-CCNC: 1.49 NG/ML
CK SERPL-CCNC: 39 U/L (ref 20–180)
CO2 SERPL-SCNC: 13.6 MMOL/L (ref 22–29)
CO2 SERPL-SCNC: 18.4 MMOL/L (ref 22–29)
COHGB MFR BLD: 0.1 % (ref 0–1.5)
CREAT SERPL-MCNC: 1.6 MG/DL (ref 0.57–1)
CREAT SERPL-MCNC: 1.63 MG/DL (ref 0.57–1)
D-LACTATE SERPL-SCNC: 1.9 MMOL/L (ref 0.5–2)
D-LACTATE SERPL-SCNC: 2.8 MMOL/L (ref 0.5–2)
DEPRECATED RDW RBC AUTO: 43.9 FL (ref 37–54)
DEPRECATED RDW RBC AUTO: 45.4 FL (ref 37–54)
EOSINOPHIL # BLD AUTO: 0 10*3/MM3 (ref 0–0.4)
EOSINOPHIL # BLD AUTO: 0.07 10*3/MM3 (ref 0–0.4)
EOSINOPHIL NFR BLD AUTO: 0 % (ref 0.3–6.2)
EOSINOPHIL NFR BLD AUTO: 0.3 % (ref 0.3–6.2)
ERYTHROCYTE [DISTWIDTH] IN BLOOD BY AUTOMATED COUNT: 13.9 % (ref 12.3–15.4)
ERYTHROCYTE [DISTWIDTH] IN BLOOD BY AUTOMATED COUNT: 14 % (ref 12.3–15.4)
FHHB: 6.5 % (ref 0–5)
GAS FLOW AIRWAY: 4 LPM
GFR SERPL CREATININE-BSD FRML MDRD: 31 ML/MIN/1.73
GFR SERPL CREATININE-BSD FRML MDRD: 32 ML/MIN/1.73
GIANT PLATELETS: NORMAL
GLOBULIN UR ELPH-MCNC: 3.3 GM/DL
GLUCOSE SERPL-MCNC: 106 MG/DL (ref 65–99)
GLUCOSE SERPL-MCNC: 172 MG/DL (ref 65–99)
HCO3 BLDA-SCNC: 16.9 MMOL/L (ref 22–26)
HCT VFR BLD AUTO: 36.4 % (ref 34–46.6)
HCT VFR BLD AUTO: 38.7 % (ref 34–46.6)
HGB BLD-MCNC: 12.2 G/DL (ref 12–15.9)
HGB BLD-MCNC: 13.2 G/DL (ref 12–15.9)
HGB BLDA-MCNC: 12.9 G/DL (ref 11.7–14.6)
HOLD SPECIMEN: NORMAL
HOLD SPECIMEN: NORMAL
IMM GRANULOCYTES # BLD AUTO: 0.44 10*3/MM3 (ref 0–0.05)
IMM GRANULOCYTES # BLD AUTO: 0.7 10*3/MM3 (ref 0–0.05)
IMM GRANULOCYTES NFR BLD AUTO: 2.2 % (ref 0–0.5)
IMM GRANULOCYTES NFR BLD AUTO: 3.4 % (ref 0–0.5)
INHALED O2 CONCENTRATION: 36 %
INR PPP: 0.99 (ref 2–3)
INR PPP: 1.12 (ref 2–3)
LARGE PLATELETS: NORMAL
LIPASE SERPL-CCNC: 35 U/L (ref 13–60)
LYMPHOCYTES # BLD AUTO: 1.66 10*3/MM3 (ref 0.7–3.1)
LYMPHOCYTES # BLD AUTO: 2.16 10*3/MM3 (ref 0.7–3.1)
LYMPHOCYTES NFR BLD AUTO: 10.4 % (ref 19.6–45.3)
LYMPHOCYTES NFR BLD AUTO: 8.2 % (ref 19.6–45.3)
MAGNESIUM SERPL-MCNC: 1.6 MG/DL (ref 1.6–2.4)
MAGNESIUM SERPL-MCNC: 1.7 MG/DL (ref 1.6–2.4)
MCH RBC QN AUTO: 29.6 PG (ref 26.6–33)
MCH RBC QN AUTO: 29.8 PG (ref 26.6–33)
MCHC RBC AUTO-ENTMCNC: 33.5 G/DL (ref 31.5–35.7)
MCHC RBC AUTO-ENTMCNC: 34.1 G/DL (ref 31.5–35.7)
MCV RBC AUTO: 86.8 FL (ref 79–97)
MCV RBC AUTO: 89 FL (ref 79–97)
METHGB BLD QL: 0.2 % (ref 0–1.5)
MODALITY: ABNORMAL
MONOCYTES # BLD AUTO: 0.73 10*3/MM3 (ref 0.1–0.9)
MONOCYTES # BLD AUTO: 0.86 10*3/MM3 (ref 0.1–0.9)
MONOCYTES NFR BLD AUTO: 3.5 % (ref 5–12)
MONOCYTES NFR BLD AUTO: 4.2 % (ref 5–12)
NEUTROPHILS NFR BLD AUTO: 17.01 10*3/MM3 (ref 1.7–7)
NEUTROPHILS NFR BLD AUTO: 17.33 10*3/MM3 (ref 1.7–7)
NEUTROPHILS NFR BLD AUTO: 81.9 % (ref 42.7–76)
NEUTROPHILS NFR BLD AUTO: 85.2 % (ref 42.7–76)
NRBC BLD AUTO-RTO: 0 /100 WBC (ref 0–0.2)
NRBC BLD AUTO-RTO: 0 /100 WBC (ref 0–0.2)
NT-PROBNP SERPL-MCNC: 1475 PG/ML (ref 0–900)
OXYHGB MFR BLDV: 93.2 % (ref 94–99)
PCO2 BLDA: 23.7 MM HG (ref 35–45)
PH BLDA: 7.47 PH UNITS (ref 7.35–7.45)
PHOSPHATE SERPL-MCNC: 2.7 MG/DL (ref 2.5–4.5)
PLATELET # BLD AUTO: 324 10*3/MM3 (ref 140–450)
PLATELET # BLD AUTO: 349 10*3/MM3 (ref 140–450)
PMV BLD AUTO: 11.1 FL (ref 6–12)
PMV BLD AUTO: 11.1 FL (ref 6–12)
PO2 BLD: 192 MM[HG] (ref 0–500)
PO2 BLDA: 69 MM HG (ref 80–100)
POTASSIUM SERPL-SCNC: 3.9 MMOL/L (ref 3.5–5.2)
POTASSIUM SERPL-SCNC: 4.7 MMOL/L (ref 3.5–5.2)
PROCALCITONIN SERPL-MCNC: 0.03 NG/ML (ref 0–0.25)
PROCALCITONIN SERPL-MCNC: 0.03 NG/ML (ref 0–0.25)
PROT SERPL-MCNC: 7.5 G/DL (ref 6–8.5)
PROTHROMBIN TIME: 10.4 SECONDS (ref 9.4–12)
PROTHROMBIN TIME: 11.5 SECONDS (ref 9.4–12)
QT INTERVAL: 428 MS
QT INTERVAL: 452 MS
RBC # BLD AUTO: 4.09 10*6/MM3 (ref 3.77–5.28)
RBC # BLD AUTO: 4.46 10*6/MM3 (ref 3.77–5.28)
RBC MORPH BLD: NORMAL
SAO2 % BLDCOA: 93.5 % (ref 95–99)
SARS-COV-2 N GENE RESP QL NAA+PROBE: NOT DETECTED
SMALL PLATELETS BLD QL SMEAR: ADEQUATE
SODIUM SERPL-SCNC: 142 MMOL/L (ref 136–145)
SODIUM SERPL-SCNC: 143 MMOL/L (ref 136–145)
TROPONIN I SERPL-MCNC: 0.04 NG/ML (ref 0–0.6)
TROPONIN I SERPL-MCNC: 0.05 NG/ML (ref 0–0.6)
WBC MORPH BLD: NORMAL
WBC NRBC COR # BLD: 20.33 10*3/MM3 (ref 3.4–10.8)
WBC NRBC COR # BLD: 20.78 10*3/MM3 (ref 3.4–10.8)
WHOLE BLOOD HOLD SPECIMEN: NORMAL
WHOLE BLOOD HOLD SPECIMEN: NORMAL

## 2021-12-07 PROCEDURE — 84145 PROCALCITONIN (PCT): CPT | Performed by: GENERAL PRACTICE

## 2021-12-07 PROCEDURE — 80053 COMPREHEN METABOLIC PANEL: CPT | Performed by: EMERGENCY MEDICINE

## 2021-12-07 PROCEDURE — 99223 1ST HOSP IP/OBS HIGH 75: CPT | Performed by: GENERAL PRACTICE

## 2021-12-07 PROCEDURE — 82550 ASSAY OF CK (CPK): CPT | Performed by: EMERGENCY MEDICINE

## 2021-12-07 PROCEDURE — 86147 CARDIOLIPIN ANTIBODY EA IG: CPT | Performed by: INTERNAL MEDICINE

## 2021-12-07 PROCEDURE — 94799 UNLISTED PULMONARY SVC/PX: CPT

## 2021-12-07 PROCEDURE — 93970 EXTREMITY STUDY: CPT | Performed by: SURGERY

## 2021-12-07 PROCEDURE — 85306 CLOT INHIBIT PROT S FREE: CPT | Performed by: INTERNAL MEDICINE

## 2021-12-07 PROCEDURE — 85610 PROTHROMBIN TIME: CPT | Performed by: GENERAL PRACTICE

## 2021-12-07 PROCEDURE — 93010 ELECTROCARDIOGRAM REPORT: CPT | Performed by: INTERNAL MEDICINE

## 2021-12-07 PROCEDURE — 93005 ELECTROCARDIOGRAM TRACING: CPT | Performed by: EMERGENCY MEDICINE

## 2021-12-07 PROCEDURE — 25010000002 CEFTRIAXONE PER 250 MG: Performed by: GENERAL PRACTICE

## 2021-12-07 PROCEDURE — 83735 ASSAY OF MAGNESIUM: CPT | Performed by: EMERGENCY MEDICINE

## 2021-12-07 PROCEDURE — 82553 CREATINE MB FRACTION: CPT | Performed by: EMERGENCY MEDICINE

## 2021-12-07 PROCEDURE — 85025 COMPLETE CBC W/AUTO DIFF WBC: CPT | Performed by: GENERAL PRACTICE

## 2021-12-07 PROCEDURE — 99285 EMERGENCY DEPT VISIT HI MDM: CPT

## 2021-12-07 PROCEDURE — 83880 ASSAY OF NATRIURETIC PEPTIDE: CPT | Performed by: EMERGENCY MEDICINE

## 2021-12-07 PROCEDURE — 93970 EXTREMITY STUDY: CPT

## 2021-12-07 PROCEDURE — 84145 PROCALCITONIN (PCT): CPT | Performed by: INTERNAL MEDICINE

## 2021-12-07 PROCEDURE — 25010000002 PIPERACILLIN SOD-TAZOBACTAM PER 1 G

## 2021-12-07 PROCEDURE — 83735 ASSAY OF MAGNESIUM: CPT | Performed by: GENERAL PRACTICE

## 2021-12-07 PROCEDURE — 83690 ASSAY OF LIPASE: CPT | Performed by: EMERGENCY MEDICINE

## 2021-12-07 PROCEDURE — 0 IOPAMIDOL PER 1 ML: Performed by: EMERGENCY MEDICINE

## 2021-12-07 PROCEDURE — 71275 CT ANGIOGRAPHY CHEST: CPT

## 2021-12-07 PROCEDURE — 85610 PROTHROMBIN TIME: CPT | Performed by: EMERGENCY MEDICINE

## 2021-12-07 PROCEDURE — 83605 ASSAY OF LACTIC ACID: CPT | Performed by: GENERAL PRACTICE

## 2021-12-07 PROCEDURE — 84484 ASSAY OF TROPONIN QUANT: CPT | Performed by: GENERAL PRACTICE

## 2021-12-07 PROCEDURE — 36600 WITHDRAWAL OF ARTERIAL BLOOD: CPT | Performed by: EMERGENCY MEDICINE

## 2021-12-07 PROCEDURE — 87635 SARS-COV-2 COVID-19 AMP PRB: CPT | Performed by: EMERGENCY MEDICINE

## 2021-12-07 PROCEDURE — 82375 ASSAY CARBOXYHB QUANT: CPT | Performed by: EMERGENCY MEDICINE

## 2021-12-07 PROCEDURE — 99223 1ST HOSP IP/OBS HIGH 75: CPT | Performed by: INTERNAL MEDICINE

## 2021-12-07 PROCEDURE — 83605 ASSAY OF LACTIC ACID: CPT | Performed by: EMERGENCY MEDICINE

## 2021-12-07 PROCEDURE — 85303 CLOT INHIBIT PROT C ACTIVITY: CPT | Performed by: INTERNAL MEDICINE

## 2021-12-07 PROCEDURE — 85210 CLOT FACTOR II PROTHROM SPEC: CPT | Performed by: INTERNAL MEDICINE

## 2021-12-07 PROCEDURE — 71045 X-RAY EXAM CHEST 1 VIEW: CPT

## 2021-12-07 PROCEDURE — 87040 BLOOD CULTURE FOR BACTERIA: CPT | Performed by: EMERGENCY MEDICINE

## 2021-12-07 PROCEDURE — 85007 BL SMEAR W/DIFF WBC COUNT: CPT | Performed by: EMERGENCY MEDICINE

## 2021-12-07 PROCEDURE — 25010000002 HEPARIN (PORCINE) PER 1000 UNITS: Performed by: EMERGENCY MEDICINE

## 2021-12-07 PROCEDURE — 25010000002 AZITHROMYCIN PER 500 MG: Performed by: GENERAL PRACTICE

## 2021-12-07 PROCEDURE — 36415 COLL VENOUS BLD VENIPUNCTURE: CPT | Performed by: INTERNAL MEDICINE

## 2021-12-07 PROCEDURE — 94640 AIRWAY INHALATION TREATMENT: CPT

## 2021-12-07 PROCEDURE — 85730 THROMBOPLASTIN TIME PARTIAL: CPT | Performed by: EMERGENCY MEDICINE

## 2021-12-07 PROCEDURE — 81241 F5 GENE: CPT | Performed by: INTERNAL MEDICINE

## 2021-12-07 PROCEDURE — 84100 ASSAY OF PHOSPHORUS: CPT | Performed by: GENERAL PRACTICE

## 2021-12-07 PROCEDURE — 83050 HGB METHEMOGLOBIN QUAN: CPT | Performed by: EMERGENCY MEDICINE

## 2021-12-07 PROCEDURE — 85025 COMPLETE CBC W/AUTO DIFF WBC: CPT | Performed by: EMERGENCY MEDICINE

## 2021-12-07 PROCEDURE — 84484 ASSAY OF TROPONIN QUANT: CPT

## 2021-12-07 PROCEDURE — 25010000002 DEXAMETHASONE PER 1 MG: Performed by: GENERAL PRACTICE

## 2021-12-07 PROCEDURE — 82805 BLOOD GASES W/O2 SATURATION: CPT | Performed by: EMERGENCY MEDICINE

## 2021-12-07 RX ORDER — ACETAMINOPHEN 500 MG
1000 TABLET ORAL ONCE
Status: COMPLETED | OUTPATIENT
Start: 2021-12-07 | End: 2021-12-07

## 2021-12-07 RX ORDER — BUDESONIDE 0.5 MG/2ML
0.5 INHALANT ORAL
Status: DISCONTINUED | OUTPATIENT
Start: 2021-12-07 | End: 2021-12-09 | Stop reason: HOSPADM

## 2021-12-07 RX ORDER — AMOXICILLIN 250 MG
2 CAPSULE ORAL 2 TIMES DAILY
Status: DISCONTINUED | OUTPATIENT
Start: 2021-12-07 | End: 2021-12-09 | Stop reason: HOSPADM

## 2021-12-07 RX ORDER — ARFORMOTEROL TARTRATE 15 UG/2ML
15 SOLUTION RESPIRATORY (INHALATION)
Status: DISCONTINUED | OUTPATIENT
Start: 2021-12-07 | End: 2021-12-09 | Stop reason: HOSPADM

## 2021-12-07 RX ORDER — SODIUM CHLORIDE 0.9 % (FLUSH) 0.9 %
10 SYRINGE (ML) INJECTION EVERY 12 HOURS SCHEDULED
Status: DISCONTINUED | OUTPATIENT
Start: 2021-12-07 | End: 2021-12-09 | Stop reason: HOSPADM

## 2021-12-07 RX ORDER — SODIUM CHLORIDE 0.9 % (FLUSH) 0.9 %
10 SYRINGE (ML) INJECTION AS NEEDED
Status: DISCONTINUED | OUTPATIENT
Start: 2021-12-07 | End: 2021-12-09 | Stop reason: HOSPADM

## 2021-12-07 RX ORDER — ASPIRIN 81 MG/1
324 TABLET, CHEWABLE ORAL ONCE
Status: COMPLETED | OUTPATIENT
Start: 2021-12-07 | End: 2021-12-07

## 2021-12-07 RX ORDER — VORTIOXETINE 5 MG/1
5 TABLET, FILM COATED ORAL DAILY
COMMUNITY
Start: 2021-11-23 | End: 2022-08-16

## 2021-12-07 RX ORDER — ATORVASTATIN CALCIUM 10 MG/1
10 TABLET, FILM COATED ORAL NIGHTLY
Status: DISCONTINUED | OUTPATIENT
Start: 2021-12-07 | End: 2021-12-09 | Stop reason: HOSPADM

## 2021-12-07 RX ORDER — BISACODYL 5 MG/1
5 TABLET, DELAYED RELEASE ORAL DAILY PRN
Status: DISCONTINUED | OUTPATIENT
Start: 2021-12-07 | End: 2021-12-09 | Stop reason: HOSPADM

## 2021-12-07 RX ORDER — BISACODYL 10 MG
10 SUPPOSITORY, RECTAL RECTAL DAILY PRN
Status: DISCONTINUED | OUTPATIENT
Start: 2021-12-07 | End: 2021-12-09 | Stop reason: HOSPADM

## 2021-12-07 RX ORDER — METOPROLOL SUCCINATE 25 MG/1
25 TABLET, EXTENDED RELEASE ORAL DAILY
Status: DISCONTINUED | OUTPATIENT
Start: 2021-12-07 | End: 2021-12-08

## 2021-12-07 RX ORDER — PREDNISONE 20 MG/1
TABLET ORAL TAKE AS DIRECTED
COMMUNITY
Start: 2021-11-30 | End: 2021-12-09

## 2021-12-07 RX ORDER — DEXAMETHASONE SODIUM PHOSPHATE 10 MG/ML
6 INJECTION INTRAMUSCULAR; INTRAVENOUS DAILY
Status: DISCONTINUED | OUTPATIENT
Start: 2021-12-07 | End: 2021-12-08

## 2021-12-07 RX ORDER — POLYETHYLENE GLYCOL 3350 17 G/17G
17 POWDER, FOR SOLUTION ORAL DAILY PRN
Status: DISCONTINUED | OUTPATIENT
Start: 2021-12-07 | End: 2021-12-09 | Stop reason: HOSPADM

## 2021-12-07 RX ORDER — HYDRALAZINE HYDROCHLORIDE 20 MG/ML
10 INJECTION INTRAMUSCULAR; INTRAVENOUS EVERY 4 HOURS PRN
Status: DISCONTINUED | OUTPATIENT
Start: 2021-12-07 | End: 2021-12-09 | Stop reason: HOSPADM

## 2021-12-07 RX ORDER — LISINOPRIL 10 MG/1
10 TABLET ORAL DAILY
Status: DISCONTINUED | OUTPATIENT
Start: 2021-12-07 | End: 2021-12-08

## 2021-12-07 RX ORDER — CEFTRIAXONE SODIUM 1 G/50ML
1 INJECTION, SOLUTION INTRAVENOUS EVERY 24 HOURS
Status: DISCONTINUED | OUTPATIENT
Start: 2021-12-07 | End: 2021-12-09 | Stop reason: HOSPADM

## 2021-12-07 RX ORDER — HEPARIN SODIUM 10000 [USP'U]/100ML
18 INJECTION, SOLUTION INTRAVENOUS
Status: DISPENSED | OUTPATIENT
Start: 2021-12-07 | End: 2021-12-08

## 2021-12-07 RX ORDER — ONDANSETRON 4 MG/1
4 TABLET, FILM COATED ORAL EVERY 6 HOURS PRN
Status: DISCONTINUED | OUTPATIENT
Start: 2021-12-07 | End: 2021-12-09 | Stop reason: HOSPADM

## 2021-12-07 RX ADMIN — ATORVASTATIN CALCIUM 10 MG: 10 TABLET, FILM COATED ORAL at 22:46

## 2021-12-07 RX ADMIN — CEFTRIAXONE SODIUM 1 G: 1 INJECTION, SOLUTION INTRAVENOUS at 18:40

## 2021-12-07 RX ADMIN — AZITHROMYCIN DIHYDRATE 500 MG: 500 INJECTION, POWDER, LYOPHILIZED, FOR SOLUTION INTRAVENOUS at 19:31

## 2021-12-07 RX ADMIN — ARFORMOTEROL TARTRATE 15 MCG: 15 SOLUTION RESPIRATORY (INHALATION) at 21:01

## 2021-12-07 RX ADMIN — NITROGLYCERIN 1 INCH: 20 OINTMENT TOPICAL at 13:06

## 2021-12-07 RX ADMIN — HEPARIN SODIUM 18 UNITS/KG/HR: 10000 INJECTION, SOLUTION INTRAVENOUS at 15:23

## 2021-12-07 RX ADMIN — METOPROLOL SUCCINATE 25 MG: 25 TABLET, EXTENDED RELEASE ORAL at 22:46

## 2021-12-07 RX ADMIN — IOPAMIDOL 100 ML: 755 INJECTION, SOLUTION INTRAVENOUS at 14:39

## 2021-12-07 RX ADMIN — ACETAMINOPHEN 1000 MG: 500 TABLET ORAL at 18:37

## 2021-12-07 RX ADMIN — DEXAMETHASONE SODIUM PHOSPHATE 6 MG: 10 INJECTION INTRAMUSCULAR; INTRAVENOUS at 18:38

## 2021-12-07 RX ADMIN — SODIUM CHLORIDE, PRESERVATIVE FREE 10 ML: 5 INJECTION INTRAVENOUS at 22:48

## 2021-12-07 RX ADMIN — LISINOPRIL 10 MG: 10 TABLET ORAL at 22:48

## 2021-12-07 RX ADMIN — BUDESONIDE 0.5 MG: 0.5 SUSPENSION RESPIRATORY (INHALATION) at 21:01

## 2021-12-07 RX ADMIN — ASPIRIN 81 MG 324 MG: 81 TABLET ORAL at 13:15

## 2021-12-07 RX ADMIN — SENNOSIDES AND DOCUSATE SODIUM 2 TABLET: 50; 8.6 TABLET ORAL at 22:47

## 2021-12-07 NOTE — H&P
[facility]   HOSPITALIST HISTORY AND PHYSICAL  Date: 2021   Patient Name: Patsy Tobin  : 1955  MRN: [id number]  Primary Care Physician:  Annetta Austin, ADRIENNE  Date of admission: 2021    Subjective   Subjective     Chief Complaint: SOB     HPI: Patsy Tobin is a 66 y.o. female who presents to the emergency department with concerns of shortness of breath.  Patient had been evaluated on  at [facility] and was diagnosed with a community-acquired pneumonia and was sent home on amoxicillin.  Patient did not improve and was evaluated by her primary care doctor today and continued to have persistent shortness of breath and difficulty breathing.  Patient was also having a very severe headache.  She was sent here for further management on arrival patient was tachypneic with respiratory rates in the 30s and mildly hypoxic with O2 sats in the low 90s.  Patient states that on the beginning of November she was diagnosed with paroxysmal atrial fibrillation not anticoagulated.  She had a long trip to [place] by Road her shortness of breath symptoms started proximately 2 weeks ago.  On arrival to the emergency department the patient blood pressure was 225/96 she was placed on a nitro patch by the emergency department physician at the time of my examination her blood pressure was 148/90.  Further work-up in the emergency department revealed an elevated proBNP of 1475.  Patient also had a leukocytosis of 20.78 neutrophilic predominance of 81.9  CT chest showed multiple bilateral pulmonary emboli.  Likely pulmonary infarct.  Dr. Leal was contacted from the emergency department commended to start patient on heparin drip and I agree on consultation.    Personal History   ROS     Constitutional: No fever, unintentional weight loss, malaise  HEENT: No vision changes, loss of vision, double vision, difficulty swallowing, painful swallowing, or tinnitus  Respiratory: Patient  is tachypneic having some difficulty breathing.  Cardiovascular: No chest pain, palpitations, orthopnea, or paroxysmal nocturnal dyspnea  Gastrointestinal: No nausea, vomiting, diarrhea, hematochezia, bright red blood per rectum, dark tarry stools, bowel incontinence or constipation  Genitourinary: No dysuria, hematuria, bladder incontinence, frequency, nocturia, or hesitancy  Musculoskeletal: No arthritis, joint swelling, deformities or joint pain  Endocrine: No fatigue, heat or cold intolerance  Hematologic: No excessive bruising or bleeding  Psychiatric: No Anxiety or depression  Neurologic: No Confusion, numbness, or weakness  Skin: No rash or open wounds         PMH  Past Medical History:   Diagnosis Date   • Abdominal pain of multiple sites    • Allergic rhinitis    • AMEYA positive    • Blurred vision, bilateral    • Deep vein thrombosis (HCC)    • Depression    • Diarrhea    • Esophagitis 2016   • Fatigue    • Gastric ulcer    • GERD (gastroesophageal reflux disease)    • Headache    • High blood pressure    • Hypertension    • Leg cramps    • Leg wound, right    • Mitral valve prolapse    • Multiple joint pain    • Overweight    • Peptic ulceration    • PONV (postoperative nausea and vomiting)    • RA (rheumatoid arthritis) ([facility])          PSH  Past Surgical History:   Procedure Laterality Date   • ABDOMINOPLASTY N/A 12/02/2014   • APPENDECTOMY N/A 1958   • COLONOSCOPY  2018   • COLONOSCOPY N/A 3/12/2021   • ELBOW ARTHROPLASTY     • ENDOSCOPY N/A 2016   • HEMORRHOIDECTOMY N/A 2009   • HIP BIPOLAR REPLACEMENT     • KNEE ARTHROSCOPY W/ MENISCECTOMY Left 2004   • KNEE MENISCECTOMY Right 2007   • SUBTOTAL HYSTERECTOMY Bilateral 1988   • TUBAL ABDOMINAL LIGATION Bilateral    • UPPER GASTROINTESTINAL ENDOSCOPY N/A 10/20/2016       FH  Family History   Problem Relation Age of Onset   • Cancer Father         kidney   • Hypertension Mother    • Diabetes Maternal Grandmother    • Colon polyps Brother    • Colon cancer  Neg Hx        SOCIAL HISTORY   reports that she quit smoking about 33 years ago. Her smoking use included cigarettes. She has a 22.00 pack-year smoking history. She has never used smokeless tobacco. She reports current alcohol use. She reports that she does not use drugs.     ALLERGIES   Allergies   Allergen Reactions   • Celebrex [Celecoxib] Other (See Comments)     Passed Out    • Arava [Leflunomide] Other (See Comments)     Altered Glucose Levels   • Duloxetine Hallucinations   • Naproxen GI Intolerance     All NSAIDS    • Plaquenil [Hydroxychloroquine Sulfate] Mental Status Change       HOME MEDICINES  Prior to Admission Medications   Prescriptions Last Dose Informant Patient Reported? Taking?   HYDROcodone-acetaminophen (NORCO) 5-325 MG per tablet 2021 at Unknown time  No Yes   Sig: Take 1 tablet by mouth Every 6 (Six) Hours As Needed for Moderate Pain .   Trintellix 5 MG tablet 2021 at Unknown time  Yes Yes   Sig: Take 5 mg by mouth Daily.   alosetron (LOTRONEX) 0.5 MG tablet Unknown at Unknown time  No No   Sig: Take 1 tablet by mouth 2 (Two) Times a Day As Needed (diarrhea).   aspirin 81 MG chewable tablet 2021 at Unknown time  Yes Yes   Sig: Chew 81 mg Daily.   etanercept (ENBREL) 50 MG/ML solution prefilled syringe injection Past Month at Unknown time  Yes Yes   Sig: Inject 50 mg under the skin 1 (One) Time Per Week.   fluticasone (FLONASE) 50 MCG/ACT nasal spray 2021 at Unknown time Self Yes Yes   Si sprays into the nostril(s) as directed by provider Daily.   hyoscyamine (LEVSIN) 0.125 MG SL tablet Unknown at Unknown time  No No   Sig: Take 1 tablet by mouth Every 6 (Six) Hours As Needed (esophageal spasm).   lisinopril (PRINIVIL,ZESTRIL) 10 MG tablet 2021 at Unknown time  Yes Yes   Sig: Take 10 mg by mouth Daily.   lovastatin (MEVACOR) 20 MG tablet 2021 at Unknown time  Yes Yes   Sig: Take 20 mg by mouth Every Night.   meloxicam (MOBIC) 15 MG tablet 2021 at Unknown  time Self Yes Yes   Sig: Take 15 mg by mouth Daily.   metoprolol succinate XL (TOPROL-XL) 25 MG 24 hr tablet 12/6/2021 at Unknown time  No Yes   Sig: Take 1 tablet by mouth Daily.   omeprazole (priLOSEC) 40 MG capsule 12/6/2021 at Unknown time  No Yes   Sig: Take 1 capsule by mouth once daily   predniSONE (DELTASONE) 20 MG tablet 12/7/2021 at Unknown time  Yes Yes   Sig: Take As Directed.      Facility-Administered Medications: None                     Objective     Vitals:   Temp:  [97.5 °F (36.4 °C)] 97.5 °F (36.4 °C)  Heart Rate:  [66-87] 66  Resp:  [24] 24  BP: (158-225)/() 166/93  Flow (L/min):  [2] 2    Physical Exam  Vital signs were reviewed.  General appearance - Patient appears well-developed and well-nourished.    Head - Normocephalic, atraumatic.  Pupils - Equal, round, reactive to light.  Extraocular muscles are intact.  Conjunctiva is clear  Oral mucosa - Pink and moist without lesions or erythema.  Uvula is midline.  Neck - Supple.  Trachea was midline.  There is no palpable lymphadenopathy or thyromegaly.  There are no meningeal signs  Lungs tachyphenic, positive shortness of breath,   Heart -Regular rate and rhythm no murmurs no gallops.  Abdomen -     There is no rebound, guarding, or rigidity.  There are no palpable masses.  There are no pulsatile masses.  Back - Spine is straight and midline.  There is no CVA tenderness.  Extremities - Intact x4 with full range of motion.  There is no palpable edema.  Neurologic - Cranial nerves II through XII are grossly intact.    Integument - There are no rashes.  There are no petechia or purpura lesions noted.  There are no vesicular lesions noted.           Assessment / Plan     Assessment/Plan:   Pulmonary embolism  History of paroxysmal A. Fib  Acute hypoxic respiratory failure  Recent history of pneumonia with leukocytosis  History of DVT in the past  History of rheumatoid arthritis  Hypertensive urgency  Hypercholesterolemia  Chronic renal  failure.    PLAN   Patient was started on a heparin drip  Obtain a procalcitonin and start empiric Rocephin and azithromycin, can de-escalate based on cultures and pro Rony level.  Patient had an echocardiogram on November but given acute PE and elevated proBNP we will  need echocardiogram now.  Patient was started on a nitro patch in the emergency department we will add hydralazine 10 mg IV every 4 as needed systolic greater than 165   We will add Brovana and Pulmicort as I feel there is a component of COPD.  Patient did have a history of smoking but stopped greater than 20 years ago.  Unable to obtain hypercoagulable studies as patient had already been started on a heparin drip.  Will consult pulmonology.  Consult cardiology for a fib   We will obtain a Covid study and rule out patient for Covid.  Patient does have Covid vaccines    DVT prophylaxis:  Heparin drip.    CODE STATUS:       Result Review:    I have personally reviewed the results from the time of this admission to 6/11/2021 06:16 EDT and agree with these findings:  [x]  Laboratory  [x]  Microbiology  [x]  Radiology  [x]  EKG/Telemetry   []  Cardiology/Vascular   []  Pathology  []  Old records  []  Other:    Admission Status:  I believe this patient meets inpatient status.    Electronically signed by Cyn Persaud MD, 12/07/21, 6:07 PM EST.

## 2021-12-07 NOTE — PAYOR COMM NOTE
"    [employer] Notification Ref# [id number]  SUBJECT:  Inpatient auth request  Patient’s Name:     Jackson Cortés  MRN# [id number]  Admitting MD Name:  Dr Cyn Persaud  Admitting Physician NPI: [id number]  Admitting Physicians Phone:  [phone]  Admitting Physicians Address:    [address] [place] [address]  Admitting ICD - 10:  Pulmonary embolism I26.99; Hypoxemia   DOS:  12/7/21  DOCUMENTS SENT:  __x___ Face Sheet  _____ ED Records (PARTIAL UNSIGNED ED Provider NOTES)     __x___ LABS   ___x__ Imaging  __x___ MED List  ___x__ Admit orders    H&P has not been dictated    Crista Jackson BHATT (66 y.o. Female)             Date of Birth Social Security Number Address Home Phone MRN    1955  [address] [place] [id number] [phone] [id number]    [place] Marital Status             Ep[employer]        Admission Date Admission Type Admitting Provider Attending Provider Department, Room/Bed    12/7/21 Emergency Cyn Persaud MD Nicholls, [place], MD [facility] EMERGENCY ROOM, 04/04    Discharge Date Discharge Disposition Discharge Destination                         Attending Provider: Damaris Torres MD    Allergies: Celebrex [Celecoxib], Arava [Leflunomide], Duloxetine, Naproxen, Plaquenil [Hydroxychloroquine Sulfate]    Isolation: None   Infection: COVID (rule out) (12/07/21)   Code Status: Not on file   Advance Care Planning Activity    Ht: 170.2 cm (67\")   Wt: 88.2 kg (194 lb 7.1 oz)    Admission Cmt: None   Principal Problem: None                Active Insurance as of 12/7/2021     Primary Coverage     Payor Plan Insurance Group Employer/Plan Group    HUMANA MEDICARE REPLACEMENT HU[employer] MEDICARE REPLACEMENT [id number]     Payor Plan Address Payor Plan Phone Number Payor Plan Fax Number Effective Dates    [address] [address] [phone]  10/1/2019 - None Entered    [facility] [id number]       Subscriber Name Subscriber Birth Date Member ID       JACKSON CORTÉS 1955 " [id number]                 Emergency Contacts      (Rel.) Home Phone Work Phone Mobile Phone    Jose Tobin (Spouse) [phone] -- [phone]    BRET MATHEWS (Daughter) [phone] -- [phone]    aretha holliday (Daughter) -- -- [phone]            Dong: LAZARA [id number] Tax ID [id number]  Problem List           Codes Noted - Resolved       Hospital    Pulmonary embolism (HCC) ICD-10-CM: I26.99  ICD-9-CM: 415.19 12/7/2021 - Present       Non-Hospital    Rectal bleeding ICD-10-CM: K62.5  ICD-9-CM: 569.3 2/26/2021 - Present    Anemia ICD-10-CM: D64.9  ICD-9-CM: 285.9 2/26/2021 - Present    Diarrhea ICD-10-CM: R19.7  ICD-9-CM: 787.91 2/26/2021 - Present    Gastroesophageal reflux disease ICD-10-CM: K21.9  ICD-9-CM: 530.81 2/26/2021 - Present    Thrombophlebitis leg ICD-10-CM: I80.3  ICD-9-CM: 451.2 4/24/2017 - Present             Emergency Department Notes      Hugh Givens RN at 12/07/21 1315        Pt satting at 85 on room air on arrival.  Pt placed on 2L NC     Hugh Givens RN  12/07/21 1322      Electronically signed by Huhg Givens RN at 12/07/21 1322    Emergency Department PROVIDER Notes- INCOMPLETE- UNSIGNED     Carol Baird   Emergency Medicine   ED Provider Notes      Shared   Date of Service:  12/07/21 1248   Creation Time:  12/07/21 1248              Shared               Time: 12:49 EST  Arrived by: EMS  Chief Complaint: SOB  History provided by: pt  History is limited by: N/A     History of Present Illness:     Patsy Tobin is a 66 y.o. female who presents to the emergency department today with complaints of shortness of breath. Pt was recently diagnosed with Pneumonia and has been compliant with antibiotics. Prior to arrival she was seen by her PCP for sx when she referred her to this ED due to low saturation of 85%. She complains of mild chest pressure, dizziness, generalized body weakness, nausea, shortness of breath and blurred vision. She  denies any headaches, fever, emesis, chest pain, or any other pertinent sx or concerns.         History provided by:  Patient   used: No          Past Medical History:            Allergies   Allergen Reactions   • Celebrex [Celecoxib] Other (See Comments)       Passed Out    • Arava [Leflunomide] Other (See Comments)       Altered Glucose Levels   • Duloxetine Hallucinations   • Naproxen GI Intolerance       All NSAIDS    • Plaquenil [Hydroxychloroquine Sulfate] Mental Status Change      Past Medical History:   Diagnosis Date   • Abdominal pain of multiple sites     • Allergic rhinitis     • AMEYA positive     • Blurred vision, bilateral     • Deep vein thrombosis (HCC)     • Depression     • Diarrhea     • Esophagitis 2016   • Fatigue     • Gastric ulcer     • GERD (gastroesophageal reflux disease)     • Headache     • High blood pressure     • Hypertension     • Leg cramps     • Leg wound, right     • Mitral valve prolapse     • Multiple joint pain     • Overweight     • Peptic ulceration     • PONV (postoperative nausea and vomiting)     • RA (rheumatoid arthritis) (HCC)     • Superficial phlebitis       in left ankle    • Tattoos       permanent makeup             Past Surgical History:   Procedure Laterality Date   • ABDOMINOPLASTY N/A 12/02/2014     Due to appendectomy site having staph infection-Dr. Keagan Yip   • APPENDECTOMY N/A 1958     Dr. Henry   • COLONOSCOPY   2018 [place]   • COLONOSCOPY N/A 3/12/2021     Procedure: COLONOSCOPY;  Surgeon: Rajesh Loco MD;  Location: [facility] [place] [facility] MAIN OR;  Service: Gastroenterology;  Laterality: N/A;   • ELBOW ARTHROPLASTY       • ENDOSCOPY N/A 2016     Dr. Rajesh Loco    • ENDOSCOPY N/A 3/12/2021     Procedure: ESOPHAGOGASTRODUODENOSCOPY;  Surgeon: Rajesh Loco MD;  Location: [facility] [place] MAIN OR;  Service: Gastroenterology;  Laterality: N/A;   • FLEXIBLE SIGMOIDOSCOPY N/A 2016     Dr. Rajesh Loco   • HEMORRHOIDECTOMY N/A 2009     Dr. Saba    • HIP BIPOLAR REPLACEMENT       • KNEE ARTHROSCOPY W/ MENISCECTOMY Left 2004     Dr. Rice   • KNEE MENISCECTOMY Right 2007     Dr. Rice   • SUBTOTAL HYSTERECTOMY Bilateral 1988     Dr. Sanford. Not due to cancer. Robotic-assisted    • TUBAL ABDOMINAL LIGATION Bilateral     • UPPER GASTROINTESTINAL ENDOSCOPY N/A 10/20/2016     UGI with biopsy. Normal esophagus: injected with botulinum toxin, Erythematous mucoas in the antrum: biopsied, normal duodenum-Dr. Rajesh Loco            Family History   Problem Relation Age of Onset   • Cancer Father           kidney   • Hypertension Mother     • Diabetes Maternal Grandmother     • Colon polyps Brother     • Colon cancer Neg Hx           Home Medications:          Prior to Admission medications    Medication Sig Start Date End Date Taking? Authorizing Provider   alosetron (LOTRONEX) 0.5 MG tablet Take 1 tablet by mouth 2 (Two) Times a Day As Needed (diarrhea). 2/26/21     Pauly Lobo APRN   amoxicillin-clavulanate (AUGMENTIN) 875-125 MG per tablet Take 1 tablet by mouth 2 (Two) Times a Day. 11/26/21     Bird Dyer III, PA   aspirin 81 MG chewable tablet Chew 81 mg Daily.       ProviderJassi MD   cetirizine (zyrTEC) 5 MG tablet Take 5 mg by mouth Every Night.       ProviderJassi MD   diclofenac (VOLTAREN) 1 % gel gel Place 4 g on the skin As Needed. 8/9/16     Jassi Solo MD   etanercept (ENBREL) 50 MG/ML solution prefilled syringe injection Inject 50 mg under the skin 1 (One) Time Per Week.       ProviderJassi MD   fluticasone (FLONASE) 50 MCG/ACT nasal spray 2 sprays into the nostril(s) as directed by provider Daily.       Jassi Solo MD   HYDROcodone-acetaminophen (NORCO) 5-325 MG per tablet Take 1 tablet by mouth Every 6 (Six) Hours As Needed for Moderate Pain . 11/26/21     Alex Goode MD   hyoscyamine (LEVSIN) 0.125 MG SL tablet Take 1 tablet by mouth Every 6 (Six) Hours As Needed  (esophageal spasm). 3/31/21     Latonia Carbone APRN   lisinopril (PRINIVIL,ZESTRIL) 10 MG tablet Take 10 mg by mouth Daily.       Jassi Solo MD   lovastatin (MEVACOR) 20 MG tablet Take 20 mg by mouth Every Night. 8/17/16     Jassi Solo MD   meloxicam (MOBIC) 15 MG tablet Take 15 mg by mouth Daily.       Jassi Solo MD   metoprolol succinate XL (TOPROL-XL) 25 MG 24 hr tablet Take 1 tablet by mouth Daily. 10/28/21     Francisco Aguilera MD   omeprazole (priLOSEC) 40 MG capsule Take 1 capsule by mouth once daily 9/20/21     Latonia Carbone APRN   polycarbophil (calcium polycarbophil) 625 MG tablet tablet Take 625 mg by mouth Daily.       Jassi Solo MD   predniSONE (DELTASONE) 1 MG tablet Take 3 mg by mouth As Needed. TAKE WITH FLARE-UPS OF RHEUMATOID ARTHRITIS 7/15/16     Jassi Solo MD   sucralfate (CARAFATE) 1 G tablet Take 1 g by mouth As Needed. 7/29/16     Jassi Solo MD         Social History:   PT  reports that she quit smoking about 33 years ago. Her smoking use included cigarettes. She has a 22.00 pack-year smoking history. She has never used smokeless tobacco. She reports current alcohol use. She reports that she does not use drugs.     Record Review:  I have reviewed the patient's records in [facility].      Review of Systems  Review of Systems   Constitutional: Negative for chills and fever.   HENT: Negative for congestion, rhinorrhea and sore throat.    Eyes: Positive for visual disturbance. Negative for pain.   Respiratory: Positive for shortness of breath. Negative for apnea, cough and chest tightness.    Cardiovascular: Positive for chest pain. Negative for palpitations.   Gastrointestinal: Negative for abdominal pain, diarrhea, nausea and vomiting.   Genitourinary: Negative for difficulty urinating and dysuria.   Musculoskeletal: Negative for joint swelling and myalgias.   Skin: Negative for color change.   Neurological: Positive for  "weakness. Negative for dizziness, seizures and headaches.   Psychiatric/Behavioral: Negative.    All other systems reviewed and are negative.        Physical Exam  BP (!) 225/96 (BP Location: Right arm, Patient Position: Lying)   Pulse 78   Temp 97.5 °F (36.4 °C) (Oral)   Resp 24   Ht 170.2 cm (67\")   Wt 88.2 kg (194 lb 7.1 oz)   SpO2 (!) 85%   BMI 30.45 kg/m²      Physical Exam  Vitals and nursing note reviewed.   Constitutional:       General: She is not in acute distress.     Appearance: Normal appearance. She is not toxic-appearing.   HENT:      Head: Normocephalic and atraumatic.      Jaw: There is normal jaw occlusion.   Eyes:      General: Lids are normal.      Extraocular Movements: Extraocular movements intact.      Conjunctiva/sclera: Conjunctivae normal.      Pupils: Pupils are equal, round, and reactive to light.   Cardiovascular:      Rate and Rhythm: Normal rate and regular rhythm.      Pulses: Normal pulses.      Heart sounds: Normal heart sounds.   Pulmonary:      Effort: Pulmonary effort is normal. No respiratory distress.      Breath sounds: Normal breath sounds. No wheezing or rhonchi.   Abdominal:      General: Abdomen is flat.      Palpations: Abdomen is soft.      Tenderness: There is no abdominal tenderness. There is no guarding or rebound.   Musculoskeletal:         General: Normal range of motion.      Cervical back: Normal range of motion and neck supple.      Right lower leg: 3+ Edema present.      Left lower leg: 3+ Edema present.   Skin:     General: Skin is warm and dry.   Neurological:      Mental Status: She is alert and oriented to person, place, and time. Mental status is at baseline.   Psychiatric:         Mood and Affect: Mood normal.                      ED Course  BP (!) 225/96 (BP Location: Right arm, Patient Position: Lying)   Pulse 78   Temp 97.5 °F (36.4 °C) (Oral)   Resp 24   Ht 170.2 cm (67\")   Wt 88.2 kg (194 lb 7.1 oz)   SpO2 (!) 85%   BMI 30.45 kg/m²       "   Results for orders placed or performed during the hospital encounter of 11/25/21   Comprehensive Metabolic Panel     Specimen: Blood   Result Value Ref Range     Glucose 100 (H) 65 - 99 mg/dL     BUN 32 (H) 8 - 23 mg/dL     Creatinine 2.08 (H) 0.57 - 1.00 mg/dL     Sodium 141 136 - 145 mmol/L     Potassium 4.5 3.5 - 5.2 mmol/L     Chloride 107 98 - 107 mmol/L     CO2 19.7 (L) 22.0 - 29.0 mmol/L     Calcium 9.0 8.6 - 10.5 mg/dL     Total Protein 7.4 6.0 - 8.5 g/dL     Albumin 4.40 3.50 - 5.20 g/dL     ALT (SGPT) 17 1 - 33 U/L     AST (SGOT) 15 1 - 32 U/L     Alkaline Phosphatase 69 39 - 117 U/L     Total Bilirubin 0.3 0.0 - 1.2 mg/dL     eGFR Non African Amer 24 (L) >60 mL/min/1.73     Globulin 3.0 gm/dL     A/G Ratio 1.5 g/dL     BUN/Creatinine Ratio 15.4 7.0 - 25.0     Anion Gap 14.3 5.0 - 15.0 mmol/L   Lipase     Specimen: Blood   Result Value Ref Range     Lipase 29 13 - 60 U/L   Urinalysis With Microscopic If Indicated (No Culture) - Urine, Clean Catch     Specimen: Urine, Clean Catch   Result Value Ref Range     Color, UA Yellow Yellow, Straw     Appearance, UA Clear Clear     pH, UA <=5.0 5.0 - 8.0     Specific Gravity, UA 1.008 1.005 - 1.030     Glucose, UA Negative Negative     Ketones, UA Negative Negative     Bilirubin, UA Negative Negative     Blood, UA Trace (A) Negative     Protein, UA Negative Negative     Leuk Esterase, UA Moderate (2+) (A) Negative     Nitrite, UA Negative Negative     Urobilinogen, UA 0.2 E.U./dL 0.2 - 1.0 E.U./dL   CBC Auto Differential     Specimen: Blood   Result Value Ref Range     WBC 12.40 (H) 3.40 - 10.80 10*3/mm3     RBC 3.82 3.77 - 5.28 10*6/mm3     Hemoglobin 11.5 (L) 12.0 - 15.9 g/dL     Hematocrit 34.3 34.0 - 46.6 %     MCV 89.8 79.0 - 97.0 fL     MCH 30.1 26.6 - 33.0 pg     MCHC 33.5 31.5 - 35.7 g/dL     RDW 13.6 12.3 - 15.4 %     RDW-SD 44.3 37.0 - 54.0 fl     MPV 11.0 6.0 - 12.0 fL     Platelets 227 140 - 450 10*3/mm3     Neutrophil % 64.5 42.7 - 76.0 %      Lymphocyte % 18.5 (L) 19.6 - 45.3 %     Monocyte % 13.8 (H) 5.0 - 12.0 %     Eosinophil % 2.0 0.3 - 6.2 %     Basophil % 0.6 0.0 - 1.5 %     Immature Grans % 0.6 (H) 0.0 - 0.5 %     Neutrophils, Absolute 8.00 (H) 1.70 - 7.00 10*3/mm3     Lymphocytes, Absolute 2.30 0.70 - 3.10 10*3/mm3     Monocytes, Absolute 1.71 (H) 0.10 - 0.90 10*3/mm3     Eosinophils, Absolute 0.25 0.00 - 0.40 10*3/mm3     Basophils, Absolute 0.07 0.00 - 0.20 10*3/mm3     Immature Grans, Absolute 0.07 (H) 0.00 - 0.05 10*3/mm3     nRBC 0.0 0.0 - 0.2 /100 WBC   Urinalysis, Microscopic Only - Urine, Clean Catch     Specimen: Urine, Clean Catch   Result Value Ref Range     RBC, UA 0-2 None Seen, 0-2 /HPF     WBC, UA 13-20 (A) None Seen, 0-2 /HPF     Bacteria, UA None Seen None Seen /HPF     Squamous Epithelial Cells, UA 0-2 None Seen, 0-2 /HPF     Hyaline Casts, UA 0-2 None Seen /LPF     Methodology Automated Microscopy     Green Top (Gel)   Result Value Ref Range     Extra Tube Hold for add-ons.     Lavender Top   Result Value Ref Range     Extra Tube hold for add-on     Gold Top - SST   Result Value Ref Range     Extra Tube Hold for add-ons.     Light Blue Top   Result Value Ref Range     Extra Tube hold for add-on        Medications   sodium chloride 0.9 % flush 10 mL (has no administration in time range)   aspirin chewable tablet 324 mg (has no administration in time range)      CT Abdomen Pelvis Without Contrast     Result Date: 11/26/2021  Narrative: Patient: JACKSON CORTÉS  Time Out: 00:30 Exam(s): CT ABDOMEN + PELVIS Without Contrast EXAM:   CT Abdomen and Pelvis Without Intravenous Contrast CLINICAL HISTORY:    Reason for exam: Right upper quadrant and right-sided chest pain. TECHNIQUE:   Axial computed tomography images of the abdomen and pelvis without intravenous contrast.  CTDI is 18.4 mGy and DLP is 1293.6 mGy-cm.  This CT exam was performed according to the principle of ALARA (As Low As Reasonably Achievable) by using one or more of  the following dose reduction techniques: automated exposure control, adjustment of the mA and or kV according to patient size, and or use of iterative reconstruction technique. COMPARISON:   No relevant prior studies available. FINDINGS:   Lung bases:  See below.    Pleural space:  There is a small right pleural effusion and minimal right basal atelectasis.  ABDOMEN:   Liver:  Unremarkable.   Gallbladder and bile ducts:  Unremarkable.  No calcified stones.  No ductal dilation.   Pancreas:  Unremarkable.  No ductal dilation.   Spleen:  Unremarkable.  No splenomegaly.   Adrenals:  Unremarkable.  No mass.   Kidneys and ureters:  Unremarkable.  No obstructing stones.  No hydronephrosis.   Stomach and bowel:  Bowel loops are nondilated.  There is mild diverticulosis of the sigmoid colon without signs of acute diverticulitis.   No acute inflammatory process is seen involving the bowel.  PELVIS:   Appendix:  No findings to suggest acute appendicitis.   Bladder:  Unremarkable.  No stones.   Reproductive:  Unremarkable as visualized.  ABDOMEN and PELVIS:   Intraperitoneal space:  The uterus has been removed.  There is a trace amount of free fluid in the pelvis.  No free air.   Bones joints:  There is artifact from a right hip arthroplasty.  No acute fracture.  No dislocation.   Soft tissues:  Unremarkable.   Vasculature:  The aorta is moderately calcified but nondilated.   Lymph nodes:  Unremarkable.  No enlarged lymph nodes. IMPRESSION:   Bowel loops are nondilated.  There is mild diverticulosis of the sigmoid colon without signs of acute diverticulitis.  No acute inflammatory process is seen involving the bowel.    Previous hysterectomy.  There is a trace amount of free fluid in the pelvis which is nonspecific.      Impression: Electronically signed by Hugh Gayle MD on 11-26-21 at 0030     CT Chest Without Contrast Diagnostic     Result Date: 11/26/2021  Narrative: Patient: JACKSON CORTÉS  Time Out: 00:17 Exam(s): CT  CHEST Without Contrast EXAM:   CT Chest Without Intravenous Contrast CLINICAL HISTORY:    Reason for exam: RUQ and right sided chest pain.. TECHNIQUE:   Axial computed tomography images of the chest without intravenous contrast.  CTDI is 18.4 mGy and DLP is 1293.6 mGy-cm.  This CT exam was performed according to the principle of ALARA (As Low As Reasonably Achievable) by using one or more of the following dose reduction techniques: automated exposure control, adjustment of the mA and or kV according to patient size, and or use of iterative reconstruction technique. COMPARISON:   No relevant prior studies available. FINDINGS:   Lungs:  Right upper lobe infiltrate suspicious for pneumonia.   Pleural space:  There is a trace right pleural effusion layering 8 mm.  No pneumothorax.   Heart:  The heart is not enlarged.  Severe coronary calcification is present.  No significant pericardial effusion.   Bones joints:  Mild degenerative changes in the lower thoracic spine.  No fracture or bone lesion.  No dislocation.   Soft tissues:  Unremarkable.   Vasculature:  The thoracic aorta is mildly calcified but nondilated.   Lymph nodes:  Unremarkable.  No enlarged lymph nodes. IMPRESSION: 1.  Right upper lobe infiltrate consistent with pneumonia. 2.  There is a trace right pleural effusion layering 8 mm.      Impression: Electronically signed by Hugh Gayle MD on 11-26-21 at 0017     US Gallbladder     Result Date: 11/25/2021  Narrative: Patient: JACKSON CORTÉS  Time Out: 23:39 Exam(s): US GALLBLADDER EXAM:   US Abdomen Limited, Gallbladder CLINICAL HISTORY:    Reason for exam: RUQ pain. TECHNIQUE:   Real-time ultrasound of the right upper quadrant with image documentation. COMPARISON:   No relevant prior studies available. FINDINGS:   Liver:  The liver measures 14.7 cm with normal echotexture.  No focal liver lesion is seen.   Gallbladder:  The gallbladder is normally distended.  No gallstones, wall thickening, or  surrounding fluid.  Sonographic Rodriguez sign is negative.   Common bile duct:  The common bile duct is nondilated measuring 3 mm.   Pancreas:  The pancreas is mostly obscured.   Right kidney:  The right kidney measures 9.3 x 4.8 x 4.4 cm with normal appearance.  No hydronephrosis.   Pleural space:  There is a trace right pleural effusion. IMPRESSION:   No acute findings in the right upper quadrant.      Impression: Electronically signed by Hugh Gayle MD on 11-25-21 at 2339     XR Chest 1 View     Result Date: 11/26/2021  Narrative: PORTABLE CHEST  HISTORY: Cough, right-sided pain.  FINDINGS: A single view of the chest demonstrates the heart to be within normal limits in size. Increased density consistent with infiltrate is appreciated involving the right upper lobe superolaterally with mild consolidation. There is no evidence of effusion or of congestive failure.  This report was finalized on 11/26/2021 12:04 PM by Dr. Sky Fish M.D.       Stress Test With Myocardial Perfusion One Day     Result Date: 11/24/2021  Narrative: · Left ventricular ejection fraction is hyperdynamic (Calculated EF > 70%). . · Breast attenuation artifact is present. · Myocardial perfusion imaging indicates a normal myocardial perfusion study with no evidence of ischemia. Small area of mild fixed perfusion defect was noted at the apex, more likely related to attenuation artifact versus apical thinning. · There is no prior study available for comparison. · Overall this represents a low risk myocardial perfusion study.          Procedures/EKGs:  Procedures     Medical Decision Making:                      [facility]      Final diagnoses:   None            Disposition:  ED Disposition      None             Documentation assistance provided by Carol Baird acting as scribe for Damaris Torres MD. Information recorded by the scribe was done at my direction and has been verified and validated by me.         Carol Baird  12/07/21 7953                   ED on 12/7/2021          Vital Signs (last day)     Date/Time Temp Temp src Pulse Resp BP Patient Position SpO2    12/07/21 1515 -- -- 66 -- 166/93 -- 93    12/07/21 1417 -- -- 75 -- 158/107 -- 95    12/07/21 1345 -- -- 72 -- 178/93 -- 96    12/07/21 13:21:49 -- -- -- -- -- -- 94    12/07/21 1317 -- -- 87 -- 194/113 -- 94    12/07/21 1316 -- -- 74 -- -- -- 94    12/07/21 1239 97.5 (36.4) Oral 78 24 225/96 Lying 85          Oxygen Therapy (last day)     Date/Time SpO2 Device (Oxygen Therapy) Flow (L/min) Oxygen Concentration (%) ETCO2 (mmHg)    12/07/21 1515 93 -- -- -- --    12/07/21 1417 95 -- -- -- --    12/07/21 1345 96 -- -- -- --    12/07/21 13:21:49 94 nasal cannula 2 -- --    12/07/21 1317 94 -- -- -- --    12/07/21 1316 94 -- -- -- --    12/07/21 1239 85 room air -- -- --            Current Facility-Administered Medications   Medication Dose Route Frequency Provider Last Rate Last Admin   • heparin 57067 units/250 mL (100 units/mL) in 0.45 % NaCl infusion  18 Units/kg/hr Intravenous Titrated Damaris Torres MD 15.87 mL/hr at 12/07/21 1523 18 Units/kg/hr at 12/07/21 1523   • heparin bolus from bag 2,500 Units  2,500 Units Intravenous PRDamaris Duron MD       • heparin bolus from bag 5,000 Units  5,000 Units Intravenous PRDamaris Duron MD       • sodium chloride 0.9 % flush 10 mL  10 mL Intravenous PRN Damaris Torres MD         Current Outpatient Medications   Medication Sig Dispense Refill   • aspirin 81 MG chewable tablet Chew 81 mg Daily.     • etanercept (ENBREL) 50 MG/ML solution prefilled syringe injection Inject 50 mg under the skin 1 (One) Time Per Week.     • fluticasone (FLONASE) 50 MCG/ACT nasal spray 2 sprays into the nostril(s) as directed by provider Daily.     • HYDROcodone-acetaminophen (NORCO) 5-325 MG per tablet Take 1 tablet by mouth Every 6 (Six) Hours As Needed for Moderate Pain . 12 tablet 0   • lisinopril (PRINIVIL,ZESTRIL) 10 MG tablet Take 10 mg  by mouth Daily.     • lovastatin (MEVACOR) 20 MG tablet Take 20 mg by mouth Every Night.     • meloxicam (MOBIC) 15 MG tablet Take 15 mg by mouth Daily.     • metoprolol succinate XL (TOPROL-XL) 25 MG 24 hr tablet Take 1 tablet by mouth Daily. 90 tablet 3   • omeprazole (priLOSEC) 40 MG capsule Take 1 capsule by mouth once daily 90 capsule 0   • predniSONE (DELTASONE) 20 MG tablet Take As Directed.     • Trintellix 5 MG tablet Take 5 mg by mouth Daily.     • alosetron (LOTRONEX) 0.5 MG tablet Take 1 tablet by mouth 2 (Two) Times a Day As Needed (diarrhea). 60 tablet 5   • hyoscyamine (LEVSIN) 0.125 MG SL tablet Take 1 tablet by mouth Every 6 (Six) Hours As Needed (esophageal spasm). 60 tablet 2       Lab Results (last 24 hours)     Procedure Component Value Units Date/Time    Procalcitonin [[id number]] Collected: 12/07/21 1300    Specimen: Blood Updated: 12/07/21 1710    POC Troponin I [[id number]]  (Normal) Collected: 12/07/21 1629    Specimen: Blood Updated: 12/07/21 1642     Troponin I 0.05 ng/mL      Comment: Serial Number: [id number]:  [id number]       aPTT [[id number]]  (Abnormal) Collected: 12/07/21 1529    Specimen: Blood Updated: 12/07/21 1608     PTT 19.6 seconds     Protime-INR [[id number]]  (Abnormal) Collected: 12/07/21 1529    Specimen: Blood Updated: 12/07/21 1549     Protime 10.4 Seconds      INR 0.99    Narrative:      Suggested Therapeutic Ranges For Oral Anticoagulant Therapy:  Level of Therapy                      INR Target Range  Standard Dose                            2.0-3.0  High Dose                                2.5-3.5  Patients not receiving anticoagulant  Therapy Normal Range                     0.6-1.2    Comprehensive Metabolic Panel [[id number]]  (Abnormal) Collected: 12/07/21 1300    Specimen: Blood Updated: 12/07/21 1415     Glucose 106 mg/dL      BUN 36 mg/dL      Creatinine 1.60 mg/dL      Sodium 142 mmol/L      Potassium 4.7 mmol/L      Comment: Specimen hemolyzed.  Results may be  affected.        Chloride 110 mmol/L      CO2 13.6 mmol/L      Calcium 9.9 mg/dL      Total Protein 7.5 g/dL      Albumin 4.20 g/dL      ALT (SGPT) 12 U/L      Comment: Specimen hemolyzed.  Results may be affected.        AST (SGOT) 20 U/L      Comment: Specimen hemolyzed.  Results may be affected.        Alkaline Phosphatase 94 U/L      Total Bilirubin 0.4 mg/dL      eGFR Non African Amer 32 mL/min/1.73      Globulin 3.3 gm/dL      A/G Ratio 1.3 g/dL      BUN/Creatinine Ratio 22.5     Anion Gap 18.4 mmol/L     Narrative:      GFR Normal >60  Chronic Kidney Disease <60  Kidney Failure <15      CBC & Differential [[id number]]  (Abnormal) Collected: 12/07/21 1300    Specimen: Blood Updated: 12/07/21 1348    Narrative:      The following orders were created for panel order CBC & Differential.  Procedure                               Abnormality         Status                     ---------                               -----------         ------                     CBC Auto Differential[[id number]]        Abnormal            Final result               Scan Slide[[id number]]                                       Final result                 Please view results for these tests on the individual orders.    Scan Slide [[id number]] Collected: 12/07/21 1300    Specimen: Blood Updated: 12/07/21 1348     RBC Morphology Normal     WBC Morphology Normal     Platelet Estimate Adequate     Large Platelets Slight/1+     Giant Platelets Slight/1+    CBC Auto Differential [[id number]]  (Abnormal) Collected: 12/07/21 1300    Specimen: Blood Updated: 12/07/21 1348     WBC 20.78 10*3/mm3      RBC 4.46 10*6/mm3      Hemoglobin 13.2 g/dL      Hematocrit 38.7 %      MCV 86.8 fL      MCH 29.6 pg      MCHC 34.1 g/dL      RDW 13.9 %      RDW-SD 43.9 fl      MPV 11.1 fL      Platelets 324 10*3/mm3      Neutrophil % 81.9 %      Lymphocyte % 10.4 %      Monocyte % 3.5 %      Eosinophil % 0.3 %      Basophil % 0.5 %      Immature Grans % 3.4 %       Neutrophils, Absolute 17.01 10*3/mm3      Lymphocytes, Absolute 2.16 10*3/mm3      Monocytes, Absolute 0.73 10*3/mm3      Eosinophils, Absolute 0.07 10*3/mm3      Basophils, Absolute 0.11 10*3/mm3      Immature Grans, Absolute 0.70 10*3/mm3      nRBC 0.0 /100 WBC     BNP [[id number]]  (Abnormal) Collected: 12/07/21 1300    Specimen: Blood Updated: 12/07/21 1340     proBNP 1,475.0 pg/mL     Narrative:      Among patients with dyspnea, NT-proBNP is highly sensitive for the detection of acute congestive heart failure. In addition NT-proBNP of <300 pg/ml effectively rules out acute congestive heart failure with 99% negative predictive value.    Results may be falsely decreased if patient taking Biotin.      CK Total & CKMB [[id number]]  (Normal) Collected: 12/07/21 1300    Specimen: Blood Updated: 12/07/21 1340     CKMB 1.49 ng/mL      Creatine Kinase 39 U/L      Comment: Specimen hemolyzed.  Results may be affected.       Narrative:      CKMB results may be falsely decreased if patient taking Biotin.    Lactic Acid, Plasma [[id number]]  (Normal) Collected: 12/07/21 1300    Specimen: Blood Updated: 12/07/21 1340     Lactate 1.9 mmol/L     Lipase [[id number]]  (Normal) Collected: 12/07/21 1300    Specimen: Blood Updated: 12/07/21 1335     Lipase 35 U/L     Magnesium [[id number]]  (Normal) Collected: 12/07/21 1300    Specimen: Blood Updated: 12/07/21 1335     Magnesium 1.7 mg/dL     POC Troponin I [[id number]]  (Normal) Collected: 12/07/21 1311    Specimen: Blood Updated: 12/07/21 1323     Troponin I 0.04 ng/mL      Comment: Serial Number: [id number]:  [id number]       Blood Culture - Blood, Arm, Right [[id number]] Collected: 12/07/21 1314    Specimen: Blood from Arm, Right Updated: 12/07/21 1321    [place] Draw [[id number]] Collected: 12/07/21 1300    Specimen: Blood Updated: 12/07/21 1314    Narrative:      The following orders were created for panel order [place] Draw.  Procedure                               Abnormality          Status                     ---------                               -----------         ------                     Green Top (Gel)[[id number]]                                  Final result               Lavender Top[[id number]]                                     Final result               Gold Top - SST[[id number]]                                   Final result               Light Blue Top[[id number]]                                   Final result                 Please view results for these tests on the individual orders.    Light Blue Top [[id number]] Collected: 12/07/21 1300    Specimen: Blood Updated: 12/07/21 1314     Extra Tube hold for add-on     Comment: Auto resulted       Lavender Top [[id number]] Collected: 12/07/21 1300    Specimen: Blood Updated: 12/07/21 1313     Extra Tube hold for add-on     Comment: Auto resulted       Gold Top - SST [[id number]] Collected: 12/07/21 1300    Specimen: Blood Updated: 12/07/21 1313     Extra Tube Hold for add-ons.     Comment: Auto resulted.       Green Top (Gel) [[id number]] Collected: 12/07/21 1300    Specimen: Blood Updated: 12/07/21 1313     Extra Tube Hold for add-ons.     Comment: Auto resulted.       Blood Culture - Blood, Arm, Left [[id number]] Collected: 12/07/21 1300    Specimen: Blood from Arm, Left Updated: 12/07/21 1310        Imaging Results (Last 24 Hours)     Procedure Component Value Units Date/Time    CT Chest Pulmonary Embolism [[id number]] Collected: 12/07/21 1452     Updated: 12/07/21 1455    Narrative:      PROCEDURE: CT CHEST PULMONARY EMBOLISM W CONTRAST     COMPARISON:  [facility], CR, XR CHEST 1 VW, 12/07/2021, 13:15.  INDICATIONS: evaluate for PE, shortness of breath, pneumonia     TECHNIQUE: After obtaining the patient's consent, CT images were obtained with non-ionic   intravenous contrast material.       PROTOCOL:   Pulmonary embolism imaging protocol performed      RADIATION:   DLP: 301.2mGy*cm    Automated exposure control was  utilized to minimize radiation dose.   CONTRAST: 100cc Isovue 370 I.V.     FINDINGS:   There are multiple filling defects within the bilateral upper and lower lobe pulmonary arteries   consistent with multiple pulmonary emboli.  No evidence of saddle embolus.  No CT evidence of right   heart strain.  Thoracic aorta is of normal caliber.  No evidence of aortic dissection.  There is no   mediastinal, hilar, or axillary adenopathy.  There are no pleural effusions.  There is wedge-shaped   peripheral airspace consolidation within the posterior lateral right upper lobe, likely due to   pulmonary infarct.  Smaller areas of ground-glass airspace disease are seen within the superior   segment of the right lower lobe which may be due to atelectasis or additional pulmonary infarcts.    No focal airspace consolidation seen within the left lung.  Bilateral adrenal glands are within   normal limits.  There are no lytic or sclerotic bony lesions identified.  There are degenerative   changes throughout the spine.     CONCLUSION:   1. Multiple bilateral pulmonary emboli  2. Airspace disease within the posterior lateral right upper lobe likely due to pulmonary infarct.    There is also some minimal peripheral ground-glass airspace disease within the superior segment of   the right lower lobe which may also be due to pulmonary infarct or atelectasis.  3. Findings were personally called to Dr. Peña in the emergency department at 1451 hours on   12/7/2021      IMAN SAM MD         Electronically Signed and Approved By: IMAN SAM MD on 12/07/2021 at 14:51                     XR Chest 1 View [[id number]] Collected: 12/07/21 1338     Updated: 12/07/21 1341    Narrative:      PROCEDURE: XR CHEST 1 VW     COMPARISON: None     INDICATIONS: SEVERE SOB, CHEST PAIN     FINDINGS:   Lungs normally expanded.  The heart size upper limits of normal.  There is no pneumothorax or   pleural effusion.  There is no focal pulmonary parenchymal  opacity.  Pulmonary vessels are   distinct.     CONCLUSION: No acute cardiopulmonary abnormality.                  KEYNA CALIX MD         Electronically Signed and Approved By: KENYA CALIX MD on 12/07/2021 at 13:38                         ECG/EMG Results (last 24 hours)     Procedure Component Value Units Date/Time    ECG 12 Lead [[id number]] Collected: 12/07/21 1254     Updated: 12/07/21 1451     QT Interval 428 ms     Narrative:      HEART RATE= 66  bpm  RR Interval= 916  ms  [place] Interval= 149  ms  P Horizontal Axis= -13  deg  P Front Axis= 61  deg  QRSD Interval= 126  ms  QT Interval= 428  ms  QRS Axis= 25  deg  T Wave Axis= 22  deg  - ABNORMAL ECG -  Sinus rhythm  Probable left atrial enlargement  Right bundle branch block  No previous ECG available for comparison  Electronically Signed By: Francisco Aguilera ([facility]) 07-Dec-2021 14:49:30  Date and Time of Study: 2021-12-07 12:54:56    ECG 12 Lead [[id number]] Collected: 12/07/21 1457     Updated: 12/07/21 1520     QT Interval 452 ms     Narrative:      HEART RATE= 69  bpm  RR Interval= 872  ms  KY Interval= 139  ms  P Horizontal Axis= -1  deg  P Front Axis= 65  deg  QRSD Interval= 130  ms  QT Interval= 452  ms  QRS Axis= 20  deg  T Wave Axis= 17  deg  - ABNORMAL ECG -  Sinus rhythm  Right bundle branch block  When compared with ECG of 07-Dec-2021 12:54:56,  Significant axis, voltage or hypertrophy change  Electronically Signed By: Francisco Aguilera ([facility]) 07-Dec-2021 15:18:59  Date and Time of Study: 2021-12-07 14:57:13          Orders (last 24 hrs)      Start     Ordered    12/07/21 2132  aPTT  Timed         12/07/21 1531    12/07/21 1643  POC Troponin I  PROCEDURE ONCE         12/07/21 1629    12/07/21 1521  Inpatient Admission  Once         12/07/21 1520    12/07/21 1520  Protime-INR  Once        Comments: Prior to Initial Heparin Bolus      12/07/21 1452    12/07/21 1520  aPTT  Once        Comments: Prior to Initial Heparin Bolus      12/07/21 1452    12/07/21 1520   Inpatient Pulmonology Consult  STAT        Specialty:  Pulmonary Disease  Provider:  (Not yet assigned)    12/07/21 1520    12/07/21 1500  heparin bolus from bag 7,100 Units  Once         12/07/21 1452    12/07/21 1500  heparin 98925 units/250 mL (100 units/mL) in 0.45 % NaCl infusion  Titrated         12/07/21 1452    12/07/21 1453  Inpatient Hospitalist Consult  Once        Specialty:  Hospitalist  Provider:  (Not yet assigned)    12/07/21 1452    12/07/21 1452  Initiate Heparin Protocol  Until Discontinued         12/07/21 1452    12/07/21 1452  Notify Provider Platelet Count Less Than [id number]  Until Discontinued         12/07/21 1452    12/07/21 1452  Notify Provider if PTT Not in Therapeutic Range After 24 Hours  Until Discontinued         12/07/21 1452    12/07/21 1452  Stop Infusion & Notify Provider if Bleeding Occurs  Until Discontinued         12/07/21 1452    12/07/21 1451  heparin bolus from bag 5,000 Units  As Needed         12/07/21 1452    12/07/21 1451  heparin bolus from bag 2,500 Units  As Needed         12/07/21 1452    12/07/21 1445  POC Troponin I  PROCEDURE ONCE         12/07/21 1245    12/07/21 1445  HOLD Troponin-I Tube  PROCEDURE ONCE         12/07/21 1245    12/07/21 1445  piperacillin-tazobactam (ZOSYN) 4.5 g/100 mL 0.9% NS IVPB (mbp)  Once         12/07/21 1433    12/07/21 1445  iopamidol (ISOVUE-370) 76 % injection 100 mL  Once in Imaging         12/07/21 1434    12/07/21 1324  POC Troponin I  PROCEDURE ONCE         12/07/21 1311    12/07/21 1315  nitroglycerin (NITROSTAT) ointment 1 inch  Once         12/07/21 1302    12/07/21 1315  nitroglycerin (NITROSTAT) ointment 0.5 inch  Once,   Status:  Discontinued         12/07/21 1303    12/07/21 1315  Scan Slide  Once         12/07/21 1314    12/07/21 1312  COVID-19,CEPHEID/MARIELY/BDMAX,COR/CLARA/PAD/KIMMY IN-HOUSE(OR EMERGENT/ADD-ON),NP SWAB IN TRANSPORT MEDIA 3-4 HR TAT, RT-PCR - Swab, Nasopharynx  Once         12/07/21 1312    12/07/21 1304  CT  Chest Pulmonary Embolism  1 Time Imaging         12/07/21 1303    12/07/21 1300  aspirin chewable tablet 324 mg  Once         12/07/21 1244    12/07/21 1259  Blood Culture - Blood, Arm, Right  Once         12/07/21 1258    12/07/21 1259  Blood Culture - Blood, Arm, Left  Once         12/07/21 1258    12/07/21 1259  Lactic Acid, Plasma  Once         12/07/21 1258    12/07/21 1245  NPO Diet  Diet Effective Now         12/07/21 1244    12/07/21 1245  Undress and Gown  Once         12/07/21 1244    12/07/21 1245  Cardiac monitoring  Per Hospital Policy         12/07/21 1244    12/07/21 1245  Continuous Pulse Oximetry  Continuous         12/07/21 1244    12/07/21 1245  ECG 12 Lead  Now & in 2 Hours       12/07/21 1244    12/07/21 1245  XR Chest 1 View  1 Time Imaging         12/07/21 1244    12/07/21 1245  Insert peripheral IV  Once         12/07/21 1244    12/07/21 1245  [place] Draw  Once         12/07/21 1244    12/07/21 1245  POC Troponin I with Hold Tube  Now Then Every 2 Hours       12/07/21 1244    12/07/21 1245  CBC & Differential  Once         12/07/21 1244    12/07/21 1245  Comprehensive Metabolic Panel  Once         12/07/21 1244    12/07/21 1245  Lipase  Once         12/07/21 1244    12/07/21 1245  BNP  Once         12/07/21 1244    12/07/21 1245  Magnesium  Once         12/07/21 1244    12/07/21 1245  CK Total & CKMB  Once         12/07/21 1244    12/07/21 1245  Green Top (Gel)  PROCEDURE ONCE         12/07/21 1244    12/07/21 1245  Lavender Top  PROCEDURE ONCE         12/07/21 1244    12/07/21 1245  Gold Top - SST  PROCEDURE ONCE         12/07/21 1244    12/07/21 1245  Light Blue Top  PROCEDURE ONCE         12/07/21 1244    12/07/21 1245  POC Troponin I  PROCEDURE ONCE         12/07/21 1244    12/07/21 1245  HOLD Troponin-I Tube  PROCEDURE ONCE         12/07/21 1245    12/07/21 1245  CBC Auto Differential  PROCEDURE ONCE         12/07/21 1245    12/07/21 1244  sodium chloride 0.9 % flush 10 mL  As Needed          12/07/21 1244    11/30/21 0000  predniSONE (DELTASONE) 20 MG tablet  Take As Directed         12/07/21 1527    11/23/21 0000  Trintellix 5 MG tablet  Daily         12/07/21 1527    Unscheduled  Oxygen Therapy- Nasal Cannula; 2 LPM; Titrate for SPO2: equal to or greater than, 92%  Continuous PRN       12/07/21 1244    Unscheduled  ECG 12 Lead  As Needed       12/07/21 1244    Unscheduled  aPTT  As Needed       12/07/21 1452    Unscheduled  RN To Release aPTT Order 6 Hours After Heparin Bolus & 6 Hours After Any Heparin Rate Change  As Needed       12/07/21 1452    Unscheduled  After 2 Consecutive Therapeutic aPTTs, Obtain aPTT Daily.  If a Rate Adjustment is Necessary, Resume Every 6 Hour aPTT Draws  As Needed       12/07/21 2341

## 2021-12-07 NOTE — CONSULTS
Pulmonary / Critical Care Consult Note      Patient Name: Patsy Tobin  : 1955  MRN: [id number]  Primary Care Physician:  Annetta Austin APRN  Referring Physician: No ref. provider found  Date of admission: 2021    Subjective   Subjective     Reason for Consult/ Chief Complaint: Pulmonary embolism, respiratory failure    HPI:  Patsy Tobin is a 66 y.o. female with history of superficial thrombophlebitis in past, recently diagnosed atrial fibrillation, was having shortness of breath starting late 2021.  She was having worsening shortness of breath to the point, where she went to emergency room at [facility] on Thanksgiving day.  Patient was diagnosed with pneumonia and was treated with outpatient antibiotics.  However, she continued to have worsening shortness of breath.  She came to the ER with worsening shortness of breath, chest heaviness and tightness, some chest discomfort today.  CT scan of the chest showed bilateral extensive pulmonary embolism.  She is being admitted for acute hypoxic respiratory failure and pulmonary embolism.  Pulmonary service is consulted for assistance with management of hypoxic respiratory failure and pulmonary embolism.  She had traveled to [facility] early November, and drove long distance during that time.  [place].  She was diagnosed with COVID-19 summer 2020.  She is vaccinated with COVID-19 Pfizer vaccine in April and May 2021.  She has no changes in weight or appetite.  No nausea or vomiting.  She has some mild increased swelling of the left leg.  No headaches, no dizziness, no passing out spells.  She is in sinus rhythm now.    Review of Systems  Constitutional symptoms:   Fatigue, weakness, otherwise denied complaints   Ear, nose, throat: Denied complaints  Cardiovascular:   Chest tightness, chest discomfort, otherwise denied complaints  Respiratory: Shortness of breath with minimal exertion, otherwise  denied complaints  Gastrointestinal: Denied complaints  Musculoskeletal: Denied complaints  Genitourinary: Denied complaints  Allergy / Immunology: Denied complaints  Hematologic: Denied complaints  Neurologic: Denied complaints  Skin: Denied complaints  Endocrine: Denied complaints  Psychiatric: Denied complaints      Personal History     Past Medical History:   Diagnosis Date   • Abdominal pain of multiple sites    • Allergic rhinitis    • AMEYA positive    • Blurred vision, bilateral    • Deep vein thrombosis (HCC)    • Depression    • Diarrhea    • Esophagitis 2016   • Fatigue    • Gastric ulcer    • GERD (gastroesophageal reflux disease)    • Headache    • High blood pressure    • Hypertension    • Leg cramps    • Leg wound, right    • Mitral valve prolapse    • Multiple joint pain    • Overweight    • Peptic ulceration    • PONV (postoperative nausea and vomiting)    • RA (rheumatoid arthritis) (HCC)    • Superficial phlebitis     in left ankle    • Tattoos     permanent makeup        Past Surgical History:   Procedure Laterality Date   • ABDOMINOPLASTY N/A 12/02/2014    Due to appendectomy site having staph infection-Dr. Keagan Yip   • APPENDECTOMY N/A 1958    Dr. Henry   • COLONOSCOPY  2018 [place]   • COLONOSCOPY N/A 3/12/2021    Procedure: COLONOSCOPY;  Surgeon: Rajesh Loco MD;  [place] [facility] [place]  Service: Gastroenterology;  Laterality: N/A;   • ELBOW ARTHROPLASTY     • ENDOSCOPY N/A 2016    Dr. Rajesh Loco    • ENDOSCOPY N/A 3/12/2021    Procedure: ESOPHAGOGASTRODUODENOSCOPY;  Surgeon: Rajesh Loco MD;  [place] [facility] [place];  Service: Gastroenterology;  Laterality: N/A;   • FLEXIBLE SIGMOIDOSCOPY N/A 2016    Dr. Rajesh Loco   • HEMORRHOIDECTOMY N/A 2009    Dr. Saba   • HIP BIPOLAR REPLACEMENT     • KNEE ARTHROSCOPY W/ MENISCECTOMY Left 2004    Dr. Rice   • KNEE MENISCECTOMY Right 2007    Dr. Rice   • SUBTOTAL HYSTERECTOMY Bilateral 1988    Dr. Sanford. Not due to cancer.  Robotic-assisted    • TUBAL ABDOMINAL LIGATION Bilateral    • UPPER GASTROINTESTINAL ENDOSCOPY N/A 10/20/2016    UGI with biopsy. Normal esophagus: injected with botulinum toxin, Erythematous mucoas in the antrum: biopsied, normal duodenum-Dr. Rajesh Loco       Family History: family history includes Cancer in her father; Colon polyps in her brother; Diabetes in her maternal grandmother; Hypertension in her mother. Otherwise pertinent FHx was reviewed and not pertinent to current issue.    Social History:  reports that she quit smoking about 33 years ago. Her smoking use included cigarettes. She has a 22.00 pack-year smoking history. She has never used smokeless tobacco. She reports current alcohol use. She reports that she does not use drugs.    Home Medications:  HYDROcodone-acetaminophen, Vortioxetine HBr, alosetron, aspirin, etanercept, fluticasone, hyoscyamine, lisinopril, lovastatin, meloxicam, metoprolol succinate XL, omeprazole, and predniSONE    Allergies:  Allergies   Allergen Reactions   • Celebrex [Celecoxib] Other (See Comments)     Passed Out    • Arava [Leflunomide] Other (See Comments)     Altered Glucose Levels   • Duloxetine Hallucinations   • Naproxen GI Intolerance     All NSAIDS    • Plaquenil [Hydroxychloroquine Sulfate] Mental Status Change       Objective    Objective     Vitals:   Temp:  [97.5 °F (36.4 °C)] 97.5 °F (36.4 °C)  Heart Rate:  [66-87] 66  Resp:  [24] 24  BP: (158-225)/() 166/93  Flow (L/min):  [2] 2    Physical Exam:  Vital Signs Reviewed   WDWN, Alert, in mild distress, on nasal oxygen.    HEENT:  PERRL, EOMI.  OP, nares clear, no sinus tenderness  Neck:  Supple, no JVD, no thyromegaly  Lymph: no axillary, cervical, supraclavicular lymphadenopathy noted bilaterally  Chest:  good aeration, clear to auscultation bilaterally, tympanic to percussion bilaterally, no work of breathing noted  CV: RRR, no MGR, pulses 2+, equal.  Abd:  Soft, NT, ND, + BS, no HSM  EXT:  no  clubbing, no cyanosis, minimal swelling left calf, no joint tenderness  Neuro:  A&Ox3, CN grossly intact, no focal deficits.  Skin: No rashes or lesions noted      Result Review    Result Review:  I have personally reviewed the results from the time of this admission to 12/7/2021 16:57 EST and agree with these findings:  [x]  Laboratory  [x]  Microbiology  [x]  Radiology  [x]  EKG/Telemetry   []  Cardiology/Vascular   []  Pathology  []  Old records  []  Other:  Most notable findings include:   CT scan of the chest was reviewed by me.  Shows bilateral extensive pulmonary embolism, more so on the right side.  Also shows possible pulmonary infarct on the right side.  Normal troponin 0 0.05.  High proBNP.        Assessment/Plan   Assessment / Plan     Active Hospital Problems:  Active Hospital Problems    Diagnosis    • Pulmonary embolism (HCC)          Impression:  Acute pulmonary embolism, with extensive clot burden  ?  History of A. Fib  Acute hypoxic respite failure    Plan:  Start heparin drip.  We will consider transitioning to oral anticoagulants tomorrow or day after.  Check echocardiogram and lower extremity ultrasound.  Not a candidate for IV thrombolysis at this time.  Check hypercoagulability panel.  Possible that blood clot was related to long distance travel early November.  Continue supplemental oxygen to keep saturations more than 90%.    Reviewed labs, imaging and provider notes.  Discussed with bedside nurse and primary service.    Thank you for allowing me to participate in care of Ms. Tobin. I will follow along.       Electronically signed by Richard Gentile MD, 12/7/2021, 16:57 EST.

## 2021-12-07 NOTE — ED PROVIDER NOTES
Time: 17:56 EST  Arrived by: EMS  Chief Complaint: SOB  History provided by: pt  History is limited by: N/A    History of Present Illness:    Patsy Tobin is a 66 y.o. female who presents to the emergency department today with complaints of shortness of breath. Pt was recently diagnosed with Pneumonia and has been compliant with antibiotics. Prior to arrival she was seen by her PCP for sx when she referred her to this ED due to low saturation of 85%. She complains of mild chest pressure, dizziness, generalized body weakness, nausea, shortness of breath and blurred vision. She denies any headaches, fever, emesis, chest pain, or any other pertinent sx or concerns.       History provided by:  Patient   used: No    Shortness of Breath  Severity:  Mild  Onset quality:  Sudden  Timing:  Constant  Progression:  Worsening  Chronicity:  New  Context: URI    Relieved by:  Nothing  Worsened by:  Nothing  Associated symptoms: chest pain    Associated symptoms: no abdominal pain, no cough, no fever, no headaches, no sore throat and no vomiting        Past Medical History:     Allergies   Allergen Reactions   • Celebrex [Celecoxib] Other (See Comments)     Passed Out    • Arava [Leflunomide] Other (See Comments)     Altered Glucose Levels   • Duloxetine Hallucinations   • Naproxen GI Intolerance     All NSAIDS    • Plaquenil [Hydroxychloroquine Sulfate] Mental Status Change     Past Medical History:   Diagnosis Date   • Abdominal pain of multiple sites    • Allergic rhinitis    • AMEYA positive    • Blurred vision, bilateral    • Deep vein thrombosis (HCC)    • Depression    • Diarrhea    • Esophagitis 2016   • Fatigue    • Gastric ulcer    • GERD (gastroesophageal reflux disease)    • Headache    • High blood pressure    • Hypertension    • Leg cramps    • Leg wound, right    • Mitral valve prolapse    • Multiple joint pain    • Overweight    • Peptic ulceration    • PONV (postoperative nausea and vomiting)     • RA (rheumatoid arthritis) (HCC)    • Superficial phlebitis     in left ankle    • Tattoos     permanent makeup      Past Surgical History:   Procedure Laterality Date   • ABDOMINOPLASTY N/A 12/02/2014    Due to appendectomy site having staph infection-Dr. Keagan Yip   • APPENDECTOMY N/A 1958    Dr. Henry   • COLONOSCOPY  2018    [facility]   • COLONOSCOPY N/A 3/12/2021    Procedure: COLONOSCOPY;  Surgeon: Rajesh Loco MD;  Location: [facility];  Service: Gastroenterology;  Laterality: N/A;   • ELBOW ARTHROPLASTY     • ENDOSCOPY N/A 2016    Dr. Rajesh Loco    • ENDOSCOPY N/A 3/12/2021    Procedure: ESOPHAGOGASTRODUODENOSCOPY;  Surgeon: Rajesh Loco MD;  Location: [facility];  Service: Gastroenterology;  Laterality: N/A;   • FLEXIBLE SIGMOIDOSCOPY N/A 2016    Dr. Rajesh Loco   • HEMORRHOIDECTOMY N/A 2009    Dr. Saba   • HIP BIPOLAR REPLACEMENT     • KNEE ARTHROSCOPY W/ MENISCECTOMY Left 2004    Dr. Rice   • KNEE MENISCECTOMY Right 2007    Dr. Rice   • SUBTOTAL HYSTERECTOMY Bilateral 1988    Dr. Sanford. Not due to cancer. Robotic-assisted    • TUBAL ABDOMINAL LIGATION Bilateral    • UPPER GASTROINTESTINAL ENDOSCOPY N/A 10/20/2016    UGI with biopsy. Normal esophagus: injected with botulinum toxin, Erythematous mucoas in the antrum: biopsied, normal duodenum-Dr. Rajesh Loco     Family History   Problem Relation Age of Onset   • Cancer Father         kidney   • Hypertension Mother    • Diabetes Maternal Grandmother    • Colon polyps Brother    • Colon cancer Neg Hx        Home Medications:  Prior to Admission medications    Medication Sig Start Date End Date Taking? Authorizing Provider   alosetron (LOTRONEX) 0.5 MG tablet Take 1 tablet by mouth 2 (Two) Times a Day As Needed (diarrhea). 2/26/21   Pauly Lobo APRN   amoxicillin-clavulanate (AUGMENTIN) 875-125 MG per tablet Take 1 tablet by mouth 2 (Two) Times a Day. 11/26/21   Bird Dyer III, PA   aspirin 81 MG chewable  tablet Chew 81 mg Daily.    Jassi Solo MD   cetirizine (zyrTEC) 5 MG tablet Take 5 mg by mouth Every Night.    Jassi Solo MD   diclofenac (VOLTAREN) 1 % gel gel Place 4 g on the skin As Needed. 8/9/16   Jassi Solo MD   etanercept (ENBREL) 50 MG/ML solution prefilled syringe injection Inject 50 mg under the skin 1 (One) Time Per Week.    Jassi Solo MD   fluticasone (FLONASE) 50 MCG/ACT nasal spray 2 sprays into the nostril(s) as directed by provider Daily.    Jassi Solo MD   HYDROcodone-acetaminophen (NORCO) 5-325 MG per tablet Take 1 tablet by mouth Every 6 (Six) Hours As Needed for Moderate Pain . 11/26/21   Alex Goode MD   hyoscyamine (LEVSIN) 0.125 MG SL tablet Take 1 tablet by mouth Every 6 (Six) Hours As Needed (esophageal spasm). 3/31/21   Latonia Carbone APRN   lisinopril (PRINIVIL,ZESTRIL) 10 MG tablet Take 10 mg by mouth Daily.    Jassi Solo MD   lovastatin (MEVACOR) 20 MG tablet Take 20 mg by mouth Every Night. 8/17/16   Jassi Solo MD   meloxicam (MOBIC) 15 MG tablet Take 15 mg by mouth Daily.    Jassi Solo MD   metoprolol succinate XL (TOPROL-XL) 25 MG 24 hr tablet Take 1 tablet by mouth Daily. 10/28/21   Francisco Aguilera MD   omeprazole (priLOSEC) 40 MG capsule Take 1 capsule by mouth once daily 9/20/21   Latonia Carbone APRN   polycarbophil (calcium polycarbophil) 625 MG tablet tablet Take 625 mg by mouth Daily.    Jassi Solo MD   predniSONE (DELTASONE) 1 MG tablet Take 3 mg by mouth As Needed. TAKE WITH FLARE-UPS OF RHEUMATOID ARTHRITIS 7/15/16   Jassi Solo MD   sucralfate (CARAFATE) 1 G tablet Take 1 g by mouth As Needed. 7/29/16   Jassi Solo MD        Social History:   PT  reports that she quit smoking about 33 years ago. Her smoking use included cigarettes. She has a 22.00 pack-year smoking history. She has never used smokeless tobacco. She reports current  "alcohol use. She reports that she does not use drugs.    Record Review:  I have reviewed the patient's records in [facility].     Review of Systems  Review of Systems   Constitutional: Negative for chills and fever.   HENT: Negative for congestion, rhinorrhea and sore throat.    Eyes: Positive for visual disturbance. Negative for pain.   Respiratory: Positive for shortness of breath. Negative for apnea, cough and chest tightness.    Cardiovascular: Positive for chest pain. Negative for palpitations.   Gastrointestinal: Negative for abdominal pain, diarrhea, nausea and vomiting.   Genitourinary: Negative for difficulty urinating and dysuria.   Musculoskeletal: Negative for joint swelling and myalgias.   Skin: Negative for color change.   Neurological: Positive for weakness. Negative for dizziness, seizures and headaches.   Psychiatric/Behavioral: Negative.    All other systems reviewed and are negative.       Physical Exam  /93   Pulse 66   Temp 97.5 °F (36.4 °C) (Oral)   Resp 24   Ht 170.2 cm (67\")   Wt 88.2 kg (194 lb 7.1 oz)   SpO2 93%   BMI 30.45 kg/m²     Physical Exam  Vitals and nursing note reviewed.   Constitutional:       General: She is not in acute distress.     Appearance: Normal appearance. She is not toxic-appearing.   HENT:      Head: Normocephalic and atraumatic.      Jaw: There is normal jaw occlusion.   Eyes:      General: Lids are normal.      Extraocular Movements: Extraocular movements intact.      Conjunctiva/sclera: Conjunctivae normal.      Pupils: Pupils are equal, round, and reactive to light.   Cardiovascular:      Rate and Rhythm: Normal rate and regular rhythm.      Pulses: Normal pulses.      Heart sounds: Normal heart sounds.   Pulmonary:      Effort: Pulmonary effort is normal. No respiratory distress.      Breath sounds: Normal breath sounds. No wheezing or rhonchi.   Abdominal:      General: Abdomen is flat.      Palpations: Abdomen is soft.      Tenderness: There is no " "abdominal tenderness. There is no guarding or rebound.   Musculoskeletal:         General: Normal range of motion.      Cervical back: Normal range of motion and neck supple.      Right lower leg: 3+ Edema present.      Left lower leg: 3+ Edema present.   Skin:     General: Skin is warm and dry.   Neurological:      Mental Status: She is alert and oriented to person, place, and time. Mental status is at baseline.   Psychiatric:         Mood and Affect: Mood normal.                  ED Course  /93   Pulse 66   Temp 97.5 °F (36.4 °C) (Oral)   Resp 24   Ht 170.2 cm (67\")   Wt 88.2 kg (194 lb 7.1 oz)   SpO2 93%   BMI 30.45 kg/m²   Results for orders placed or performed during the hospital encounter of 12/07/21   Comprehensive Metabolic Panel    Specimen: Blood   Result Value Ref Range    Glucose 106 (H) 65 - 99 mg/dL    BUN 36 (H) 8 - 23 mg/dL    Creatinine 1.60 (H) 0.57 - 1.00 mg/dL    Sodium 142 136 - 145 mmol/L    Potassium 4.7 3.5 - 5.2 mmol/L    Chloride 110 (H) 98 - 107 mmol/L    CO2 13.6 (L) 22.0 - 29.0 mmol/L    Calcium 9.9 8.6 - 10.5 mg/dL    Total Protein 7.5 6.0 - 8.5 g/dL    Albumin 4.20 3.50 - 5.20 g/dL    ALT (SGPT) 12 1 - 33 U/L    AST (SGOT) 20 1 - 32 U/L    Alkaline Phosphatase 94 39 - 117 U/L    Total Bilirubin 0.4 0.0 - 1.2 mg/dL    eGFR Non African Amer 32 (L) >60 mL/min/1.73    Globulin 3.3 gm/dL    A/G Ratio 1.3 g/dL    BUN/Creatinine Ratio 22.5 7.0 - 25.0    Anion Gap 18.4 (H) 5.0 - 15.0 mmol/L   Lipase    Specimen: Blood   Result Value Ref Range    Lipase 35 13 - 60 U/L   BNP    Specimen: Blood   Result Value Ref Range    proBNP 1,475.0 (H) 0.0 - 900.0 pg/mL   Magnesium    Specimen: Blood   Result Value Ref Range    Magnesium 1.7 1.6 - 2.4 mg/dL   CK Total & CKMB    Specimen: Blood   Result Value Ref Range    CKMB 1.49 <=5.30 ng/mL    Creatine Kinase 39 20 - 180 U/L   CBC Auto Differential    Specimen: Blood   Result Value Ref Range    WBC 20.78 (H) 3.40 - 10.80 10*3/mm3    RBC 4.46 " 3.77 - 5.28 10*6/mm3    Hemoglobin 13.2 12.0 - 15.9 g/dL    Hematocrit 38.7 34.0 - 46.6 %    MCV 86.8 79.0 - 97.0 fL    MCH 29.6 26.6 - 33.0 pg    MCHC 34.1 31.5 - 35.7 g/dL    RDW 13.9 12.3 - 15.4 %    RDW-SD 43.9 37.0 - 54.0 fl    MPV 11.1 6.0 - 12.0 fL    Platelets 324 140 - 450 10*3/mm3    Neutrophil % 81.9 (H) 42.7 - 76.0 %    Lymphocyte % 10.4 (L) 19.6 - 45.3 %    Monocyte % 3.5 (L) 5.0 - 12.0 %    Eosinophil % 0.3 0.3 - 6.2 %    Basophil % 0.5 0.0 - 1.5 %    Immature Grans % 3.4 (H) 0.0 - 0.5 %    Neutrophils, Absolute 17.01 (H) 1.70 - 7.00 10*3/mm3    Lymphocytes, Absolute 2.16 0.70 - 3.10 10*3/mm3    Monocytes, Absolute 0.73 0.10 - 0.90 10*3/mm3    Eosinophils, Absolute 0.07 0.00 - 0.40 10*3/mm3    Basophils, Absolute 0.11 0.00 - 0.20 10*3/mm3    Immature Grans, Absolute 0.70 (H) 0.00 - 0.05 10*3/mm3    nRBC 0.0 0.0 - 0.2 /100 WBC   Lactic Acid, Plasma    Specimen: Blood   Result Value Ref Range    Lactate 1.9 0.5 - 2.0 mmol/L   Scan Slide    Specimen: Blood   Result Value Ref Range    RBC Morphology Normal Normal    WBC Morphology Normal Normal    Platelet Estimate Adequate Normal    Large Platelets Slight/1+ None Seen    Giant Platelets Slight/1+ None Seen   Protime-INR    Specimen: Blood   Result Value Ref Range    Protime 10.4 9.4 - 12.0 Seconds    INR 0.99 (L) 2.00 - 3.00   aPTT    Specimen: Blood   Result Value Ref Range    PTT 19.6 (L) 22.2 - 34.2 seconds   Procalcitonin    Specimen: Blood   Result Value Ref Range    Procalcitonin 0.03 0.00 - 0.25 ng/mL   POC Troponin I    Specimen: Blood   Result Value Ref Range    Troponin I 0.04 0.00 - 0.60 ng/mL   POC Troponin I    Specimen: Blood   Result Value Ref Range    Troponin I 0.05 0.00 - 0.60 ng/mL   ECG 12 Lead   Result Value Ref Range    QT Interval 428 ms   ECG 12 Lead   Result Value Ref Range    QT Interval 452 ms   Green Top (Gel)   Result Value Ref Range    Extra Tube Hold for add-ons.    Lavender Top   Result Value Ref Range    Extra Tube hold  for add-on    Gold Top - SST   Result Value Ref Range    Extra Tube Hold for add-ons.    Light Blue Top   Result Value Ref Range    Extra Tube hold for add-on      Medications   sodium chloride 0.9 % flush 10 mL (has no administration in time range)   heparin 71992 units/250 mL (100 units/mL) in 0.45 % NaCl infusion (18 Units/kg/hr × 88.2 kg Intravenous New Bag 12/7/21 1523)   heparin bolus from bag 5,000 Units (has no administration in time range)   heparin bolus from bag 2,500 Units (has no administration in time range)   sodium chloride 0.9 % flush 10 mL (has no administration in time range)   sodium chloride 0.9 % flush 10 mL (has no administration in time range)   sennosides-docusate (PERICOLACE) 8.6-50 MG per tablet 2 tablet (has no administration in time range)     And   polyethylene glycol (MIRALAX) packet 17 g (has no administration in time range)     And   bisacodyl (DULCOLAX) EC tablet 5 mg (has no administration in time range)     And   bisacodyl (DULCOLAX) suppository 10 mg (has no administration in time range)   ondansetron (ZOFRAN) tablet 4 mg (has no administration in time range)   cefTRIAXone (ROCEPHIN) IVPB 1 g (has no administration in time range)   AZITHROMYCIN 500 MG/250 ML 0.9% NS IVPB (vial-mate) (has no administration in time range)   dexamethasone (DECADRON) injection 6 mg (has no administration in time range)   arformoterol (BROVANA) nebulizer solution 15 mcg (has no administration in time range)   budesonide (PULMICORT) nebulizer solution 0.5 mg (has no administration in time range)   aspirin chewable tablet 324 mg (324 mg Oral Given 12/7/21 1315)   nitroglycerin (NITROSTAT) ointment 1 inch (1 inch Topical Given 12/7/21 1306)   piperacillin-tazobactam (ZOSYN) 4.5 g/100 mL 0.9% NS IVPB (mbp) (0 g Intravenous Stopped 12/7/21 1549)   iopamidol (ISOVUE-370) 76 % injection 100 mL (100 mL Intravenous Given 12/7/21 1439)   heparin bolus from bag 7,100 Units (7,100 Units Intravenous Bolus from Bag  12/7/21 1524)     CT Abdomen Pelvis Without Contrast    Result Date: 11/26/2021  Narrative: Patient: JACKSON CORTÉS  Time Out: 00:30 Exam(s): CT ABDOMEN + PELVIS Without Contrast EXAM:   CT Abdomen and Pelvis Without Intravenous Contrast CLINICAL HISTORY:    Reason for exam: Right upper quadrant and right-sided chest pain. TECHNIQUE:   Axial computed tomography images of the abdomen and pelvis without intravenous contrast.  CTDI is 18.4 mGy and DLP is 1293.6 mGy-cm.  This CT exam was performed according to the principle of ALARA (As Low As Reasonably Achievable) by using one or more of the following dose reduction techniques: automated exposure control, adjustment of the mA and or kV according to patient size, and or use of iterative reconstruction technique. COMPARISON:   No relevant prior studies available. FINDINGS:   Lung bases:  See below.    Pleural space:  There is a small right pleural effusion and minimal right basal atelectasis.  ABDOMEN:   Liver:  Unremarkable.   Gallbladder and bile ducts:  Unremarkable.  No calcified stones.  No ductal dilation.   Pancreas:  Unremarkable.  No ductal dilation.   Spleen:  Unremarkable.  No splenomegaly.   Adrenals:  Unremarkable.  No mass.   Kidneys and ureters:  Unremarkable.  No obstructing stones.  No hydronephrosis.   Stomach and bowel:  Bowel loops are nondilated.  There is mild diverticulosis of the sigmoid colon without signs of acute diverticulitis.   No acute inflammatory process is seen involving the bowel.  PELVIS:   Appendix:  No findings to suggest acute appendicitis.   Bladder:  Unremarkable.  No stones.   Reproductive:  Unremarkable as visualized.  ABDOMEN and PELVIS:   Intraperitoneal space:  The uterus has been removed.  There is a trace amount of free fluid in the pelvis.  No free air.   Bones joints:  There is artifact from a right hip arthroplasty.  No acute fracture.  No dislocation.   Soft tissues:  Unremarkable.   Vasculature:  The aorta is  moderately calcified but nondilated.   Lymph nodes:  Unremarkable.  No enlarged lymph nodes. IMPRESSION:   Bowel loops are nondilated.  There is mild diverticulosis of the sigmoid colon without signs of acute diverticulitis.  No acute inflammatory process is seen involving the bowel.    Previous hysterectomy.  There is a trace amount of free fluid in the pelvis which is nonspecific.     Impression: Electronically signed by Hugh Gayle MD on 11-26-21 at 0030    CT Chest Without Contrast Diagnostic    Result Date: 11/26/2021  Narrative: Patient: JACKSON CORTÉS  Time Out: 00:17 Exam(s): CT CHEST Without Contrast EXAM:   CT Chest Without Intravenous Contrast CLINICAL HISTORY:    Reason for exam: RUQ and right sided chest pain.. TECHNIQUE:   Axial computed tomography images of the chest without intravenous contrast.  CTDI is 18.4 mGy and DLP is 1293.6 mGy-cm.  This CT exam was performed according to the principle of ALARA (As Low As Reasonably Achievable) by using one or more of the following dose reduction techniques: automated exposure control, adjustment of the mA and or kV according to patient size, and or use of iterative reconstruction technique. COMPARISON:   No relevant prior studies available. FINDINGS:   Lungs:  Right upper lobe infiltrate suspicious for pneumonia.   Pleural space:  There is a trace right pleural effusion layering 8 mm.  No pneumothorax.   Heart:  The heart is not enlarged.  Severe coronary calcification is present.  No significant pericardial effusion.   Bones joints:  Mild degenerative changes in the lower thoracic spine.  No fracture or bone lesion.  No dislocation.   Soft tissues:  Unremarkable.   Vasculature:  The thoracic aorta is mildly calcified but nondilated.   Lymph nodes:  Unremarkable.  No enlarged lymph nodes. IMPRESSION: 1.  Right upper lobe infiltrate consistent with pneumonia. 2.  There is a trace right pleural effusion layering 8 mm.     Impression: Electronically signed  by Hugh Gayle MD on 11-26-21 at 0017    US Gallbladder    Result Date: 11/25/2021  Narrative: Patient: JACKSON CORTÉS  Time Out: 23:39 Exam(s): US GALLBLADDER EXAM:   US Abdomen Limited, Gallbladder CLINICAL HISTORY:    Reason for exam: RUQ pain. TECHNIQUE:   Real-time ultrasound of the right upper quadrant with image documentation. COMPARISON:   No relevant prior studies available. FINDINGS:   Liver:  The liver measures 14.7 cm with normal echotexture.  No focal liver lesion is seen.   Gallbladder:  The gallbladder is normally distended.  No gallstones, wall thickening, or surrounding fluid.  Sonographic Rodriguez sign is negative.   Common bile duct:  The common bile duct is nondilated measuring 3 mm.   Pancreas:  The pancreas is mostly obscured.   Right kidney:  The right kidney measures 9.3 x 4.8 x 4.4 cm with normal appearance.  No hydronephrosis.   Pleural space:  There is a trace right pleural effusion. IMPRESSION:   No acute findings in the right upper quadrant.     Impression: Electronically signed by Hugh Gayle MD on 11-25-21 at 2339    XR Chest 1 View    Result Date: 12/7/2021  Narrative: PROCEDURE: XR CHEST 1 VW  COMPARISON: None  INDICATIONS: SEVERE SOB, CHEST PAIN  FINDINGS:  Lungs normally expanded.  The heart size upper limits of normal.  There is no pneumothorax or pleural effusion.  There is no focal pulmonary parenchymal opacity.  Pulmonary vessels are distinct.  CONCLUSION: No acute cardiopulmonary abnormality.       KENYA CALIX MD       Electronically Signed and Approved By: KENYA CALIX MD on 12/07/2021 at 13:38             XR Chest 1 View    Result Date: 11/26/2021  Narrative: PORTABLE CHEST  HISTORY: Cough, right-sided pain.  FINDINGS: A single view of the chest demonstrates the heart to be within normal limits in size. Increased density consistent with infiltrate is appreciated involving the right upper lobe superolaterally with mild consolidation. There is no evidence of effusion or of  congestive failure.  This report was finalized on 11/26/2021 12:04 PM by Dr. Sky Fish M.D.      CT Chest Pulmonary Embolism    Result Date: 12/7/2021  Narrative: PROCEDURE: CT CHEST PULMONARY EMBOLISM W CONTRAST  COMPARISON:  Psychiatric, CR, XR CHEST 1 VW, 12/07/2021, 13:15. INDICATIONS: evaluate for PE, shortness of breath, pneumonia  TECHNIQUE: After obtaining the patient's consent, CT images were obtained with non-ionic intravenous contrast material.   PROTOCOL:   Pulmonary embolism imaging protocol performed    RADIATION:   DLP: 301.2mGy*cm   Automated exposure control was utilized to minimize radiation dose. CONTRAST: 100cc Isovue 370 I.V.  FINDINGS:  There are multiple filling defects within the bilateral upper and lower lobe pulmonary arteries consistent with multiple pulmonary emboli.  No evidence of saddle embolus.  No CT evidence of right heart strain.  Thoracic aorta is of normal caliber.  No evidence of aortic dissection.  There is no mediastinal, hilar, or axillary adenopathy.  There are no pleural effusions.  There is wedge-shaped peripheral airspace consolidation within the posterior lateral right upper lobe, likely due to pulmonary infarct.  Smaller areas of ground-glass airspace disease are seen within the superior segment of the right lower lobe which may be due to atelectasis or additional pulmonary infarcts.  No focal airspace consolidation seen within the left lung.  Bilateral adrenal glands are within normal limits.  There are no lytic or sclerotic bony lesions identified.  There are degenerative changes throughout the spine.  CONCLUSION:  1. Multiple bilateral pulmonary emboli 2. Airspace disease within the posterior lateral right upper lobe likely due to pulmonary infarct.  There is also some minimal peripheral ground-glass airspace disease within the superior segment of the right lower lobe which may also be due to pulmonary infarct or atelectasis. 3. Findings were  personally called to Dr. Peña in the emergency department at 1451 hours on 12/7/2021   IMAN SAM MD       Electronically Signed and Approved By: IMAN SAM MD on 12/07/2021 at 14:51             Stress Test With Myocardial Perfusion One Day    Result Date: 11/24/2021  Narrative: · Left ventricular ejection fraction is hyperdynamic (Calculated EF > 70%). . · Breast attenuation artifact is present. · Myocardial perfusion imaging indicates a normal myocardial perfusion study with no evidence of ischemia. Small area of mild fixed perfusion defect was noted at the apex, more likely related to attenuation artifact versus apical thinning. · There is no prior study available for comparison. · Overall this represents a low risk myocardial perfusion study.        Procedures/EKGs:  Procedures    EKG:    Rhythm: sinus  Rate: 69  Axis: normal  Intervals: RBBB  ST Segment: no elevation    EKG Comparison: unchanged    Interpreted by me        Medical Decision Making:                         MDM  Number of Diagnoses or Management Options  Other acute pulmonary embolism, unspecified whether acute cor pulmonale present (HCC)  Diagnosis management comments: CBC shows a white blood cell count of 20,000.  CMP reveals a BUN of 36 and a creatinine of 1.6.  Initial lactic acid is 1.9.  Magnesium 1.7.  Troponin is negative.  CT chest shows bilateral pulmonary emboli.  Patient was started on heparin.  Patient also given Zosyn in the ED.  Case was discussed with Dr. Lemus who agrees to consult.        Total Critical Care time of 45 minutes. Total critical care time documented does not include time spent on separately billed procedures for services of nurses or physician assistants. I personally saw and examined the patient. I have reviewed all diagnostic interpretations and treatment plans as written. I was present for the key portions of any procedures performed and the inclusive time noted in any critical care statement.  Critical care time includes patient management by me, time spent at the patients bedside,  time to review lab and imaging results, discussing patient care, documentation in the medical record, and time spent with family or caregiver.       Amount and/or Complexity of Data Reviewed  Clinical lab tests: ordered and reviewed  Tests in the radiology section of CPT®: ordered and reviewed  Tests in the medicine section of CPT®: ordered and reviewed  Discussion of test results with the performing providers: yes  Discuss the patient with other providers: yes  Independent visualization of images, tracings, or specimens: yes    Risk of Complications, Morbidity, and/or Mortality  Presenting problems: moderate  Management options: moderate    Patient Progress  Patient progress: stable       Final diagnoses:   Other acute pulmonary embolism, unspecified whether acute cor pulmonale present (HCC)          Disposition:  ED Disposition     ED Disposition Condition Comment    Decision to Admit  Level of Care: Telemetry [5]   Diagnosis: Pulmonary embolism (HCC) [301250]   Admitting Physician: TROY CENTENO [[id number]]   Isolate for COVID?: Yes [1]   Certification: I Certify That Inpatient Hospital Services Are Medically Necessary For Greater Than 2 Midnights            Documentation assistance provided by Damaris Torres MD acting as scribe for Damaris Torres MD. Information recorded by the scribe was done at my direction and has been verified and validated by me.        Carol Baird  12/07/21 1301       Damaris Torres MD  12/07/21 1756       Damaris Torres MD  12/07/21 1753

## 2021-12-08 ENCOUNTER — APPOINTMENT (OUTPATIENT)
Dept: CARDIOLOGY | Facility: HOSPITAL | Age: 66
End: 2021-12-08

## 2021-12-08 LAB
ALBUMIN SERPL-MCNC: 3.5 G/DL (ref 3.5–5.2)
ALBUMIN/GLOB SERPL: 1.2 G/DL
ALP SERPL-CCNC: 79 U/L (ref 39–117)
ALT SERPL W P-5'-P-CCNC: 10 U/L (ref 1–33)
ANION GAP SERPL CALCULATED.3IONS-SCNC: 12.8 MMOL/L (ref 5–15)
APTT PPP: 24.7 SECONDS (ref 22.2–34.2)
APTT PPP: 77.8 SECONDS (ref 22.2–34.2)
APTT PPP: NORMAL S
AST SERPL-CCNC: 13 U/L (ref 1–32)
BASOPHILS # BLD AUTO: 0.1 10*3/MM3 (ref 0–0.2)
BASOPHILS NFR BLD AUTO: 0.6 % (ref 0–1.5)
BH CV ECHO MEAS - AO ROOT DIAM: 2.5 CM
BH CV ECHO MEAS - EDV(MOD-SP2): 26.4 ML
BH CV ECHO MEAS - EDV(MOD-SP4): 41 ML
BH CV ECHO MEAS - EF(MOD-SP2): 57 %
BH CV ECHO MEAS - EF(MOD-SP4): 63 %
BH CV ECHO MEAS - ESV(MOD-SP2): 11 ML
BH CV ECHO MEAS - ESV(MOD-SP4): 15 ML
BH CV ECHO MEAS - IVSD: 1.2 CM
BH CV ECHO MEAS - LA DIMENSION(2D): 3 CM
BH CV ECHO MEAS - LAT PEAK E' VEL: 10.8 CM/SEC
BH CV ECHO MEAS - LVIDD: 3.8 CM
BH CV ECHO MEAS - LVIDS: 2.3 CM
BH CV ECHO MEAS - LVOT DIAM: 1.9 CM
BH CV ECHO MEAS - LVPWD: 1.3 CM
BH CV ECHO MEAS - MED PEAK E' VEL: 5.11 CM/SEC
BH CV ECHO MEAS - MV A MAX VEL: 107 CM/SEC
BH CV ECHO MEAS - MV DEC TIME: 264 MSEC
BH CV ECHO MEAS - MV E MAX VEL: 79 CM/SEC
BH CV ECHO MEAS - MV E/A: 0.7
BH CV ECHO MEAS - MV MAX PG: 4 MMHG
BH CV ECHO MEAS - MV MEAN PG: 2 MMHG
BH CV ECHO MEAS - MV P1/2T: 77 MSEC
BH CV ECHO MEAS - MV V2 VTI: 25 CM
BH CV ECHO MEAS - RAP SYSTOLE: 3 MMHG
BH CV ECHO MEAS - RVDD: 2.4 CM
BH CV ECHO MEAS - RVSP: 65 MMHG
BH CV ECHO MEAS - TR MAX PG: 62 MMHG
BH CV ECHO MEAS - TR MAX VEL: 393 CM/SEC
BH CV ECHO MEASUREMENTS AVERAGE E/E' RATIO: 9.93
BH CV LOW VAS LEFT DISTAL FEMORAL SPONT: 1
BH CV LOW VAS LEFT POPLITEAL SPONT: 1
BH CV LOW VAS LEFT POSTERIOR TIBIAL VESSEL: 1
BH CV LOWER VASCULAR LEFT COMMON FEMORAL AUGMENT: NORMAL
BH CV LOWER VASCULAR LEFT COMMON FEMORAL COMPETENT: NORMAL
BH CV LOWER VASCULAR LEFT COMMON FEMORAL COMPRESS: NORMAL
BH CV LOWER VASCULAR LEFT COMMON FEMORAL PHASIC: NORMAL
BH CV LOWER VASCULAR LEFT COMMON FEMORAL SPONT: NORMAL
BH CV LOWER VASCULAR LEFT DISTAL FEMORAL AUGMENT: NORMAL
BH CV LOWER VASCULAR LEFT DISTAL FEMORAL COMPETENT: NORMAL
BH CV LOWER VASCULAR LEFT DISTAL FEMORAL COMPRESS: NORMAL
BH CV LOWER VASCULAR LEFT DISTAL FEMORAL PHASIC: NORMAL
BH CV LOWER VASCULAR LEFT DISTAL FEMORAL SPONT: NORMAL
BH CV LOWER VASCULAR LEFT GREATER SAPH AK COMPRESS: NORMAL
BH CV LOWER VASCULAR LEFT MID FEMORAL AUGMENT: NORMAL
BH CV LOWER VASCULAR LEFT MID FEMORAL COMPETENT: NORMAL
BH CV LOWER VASCULAR LEFT MID FEMORAL COMPRESS: NORMAL
BH CV LOWER VASCULAR LEFT MID FEMORAL PHASIC: NORMAL
BH CV LOWER VASCULAR LEFT MID FEMORAL SPONT: NORMAL
BH CV LOWER VASCULAR LEFT PERONEAL COMPRESS: NORMAL
BH CV LOWER VASCULAR LEFT POPLITEAL AUGMENT: NORMAL
BH CV LOWER VASCULAR LEFT POPLITEAL COMPETENT: NORMAL
BH CV LOWER VASCULAR LEFT POPLITEAL COMPRESS: NORMAL
BH CV LOWER VASCULAR LEFT POPLITEAL PHASIC: NORMAL
BH CV LOWER VASCULAR LEFT POPLITEAL SPONT: NORMAL
BH CV LOWER VASCULAR LEFT POSTERIOR TIBIAL COMPRESS: NORMAL
BH CV LOWER VASCULAR LEFT PROXIMAL FEMORAL COMPRESS: NORMAL
BH CV LOWER VASCULAR RIGHT COMMON FEMORAL AUGMENT: NORMAL
BH CV LOWER VASCULAR RIGHT COMMON FEMORAL COMPETENT: NORMAL
BH CV LOWER VASCULAR RIGHT COMMON FEMORAL COMPRESS: NORMAL
BH CV LOWER VASCULAR RIGHT COMMON FEMORAL PHASIC: NORMAL
BH CV LOWER VASCULAR RIGHT COMMON FEMORAL SPONT: NORMAL
BH CV LOWER VASCULAR RIGHT DISTAL FEMORAL COMPRESS: NORMAL
BH CV LOWER VASCULAR RIGHT GREATER SAPH AK COMPRESS: NORMAL
BH CV LOWER VASCULAR RIGHT MID FEMORAL AUGMENT: NORMAL
BH CV LOWER VASCULAR RIGHT MID FEMORAL COMPETENT: NORMAL
BH CV LOWER VASCULAR RIGHT MID FEMORAL COMPRESS: NORMAL
BH CV LOWER VASCULAR RIGHT MID FEMORAL PHASIC: NORMAL
BH CV LOWER VASCULAR RIGHT MID FEMORAL SPONT: NORMAL
BH CV LOWER VASCULAR RIGHT PERONEAL COMPRESS: NORMAL
BH CV LOWER VASCULAR RIGHT POPLITEAL AUGMENT: NORMAL
BH CV LOWER VASCULAR RIGHT POPLITEAL COMPETENT: NORMAL
BH CV LOWER VASCULAR RIGHT POPLITEAL COMPRESS: NORMAL
BH CV LOWER VASCULAR RIGHT POPLITEAL PHASIC: NORMAL
BH CV LOWER VASCULAR RIGHT POPLITEAL SPONT: NORMAL
BH CV LOWER VASCULAR RIGHT POSTERIOR TIBIAL COMPRESS: NORMAL
BH CV LOWER VASCULAR RIGHT PROXIMAL FEMORAL COMPRESS: NORMAL
BILIRUB SERPL-MCNC: 0.2 MG/DL (ref 0–1.2)
BUN SERPL-MCNC: 36 MG/DL (ref 8–23)
BUN/CREAT SERPL: 24.8 (ref 7–25)
CALCIUM SPEC-SCNC: 9 MG/DL (ref 8.6–10.5)
CHLORIDE SERPL-SCNC: 110 MMOL/L (ref 98–107)
CHOLEST SERPL-MCNC: 225 MG/DL (ref 0–200)
CO2 SERPL-SCNC: 17.2 MMOL/L (ref 22–29)
CREAT SERPL-MCNC: 1.45 MG/DL (ref 0.57–1)
D-LACTATE SERPL-SCNC: 1.2 MMOL/L (ref 0.5–2)
DEPRECATED RDW RBC AUTO: 45.7 FL (ref 37–54)
EOSINOPHIL # BLD AUTO: 0 10*3/MM3 (ref 0–0.4)
EOSINOPHIL NFR BLD AUTO: 0 % (ref 0.3–6.2)
ERYTHROCYTE [DISTWIDTH] IN BLOOD BY AUTOMATED COUNT: 14.2 % (ref 12.3–15.4)
F5 GENE MUT ANL BLD/T: NORMAL
GFR SERPL CREATININE-BSD FRML MDRD: 36 ML/MIN/1.73
GLOBULIN UR ELPH-MCNC: 2.9 GM/DL
GLUCOSE SERPL-MCNC: 126 MG/DL (ref 65–99)
HBA1C MFR BLD: 5.7 % (ref 4.8–5.6)
HCT VFR BLD AUTO: 37.3 % (ref 34–46.6)
HDLC SERPL-MCNC: 72 MG/DL (ref 40–60)
HGB BLD-MCNC: 12.2 G/DL (ref 12–15.9)
IMM GRANULOCYTES # BLD AUTO: 0.53 10*3/MM3 (ref 0–0.05)
IMM GRANULOCYTES NFR BLD AUTO: 3.1 % (ref 0–0.5)
INR PPP: 1.01 (ref 2–3)
IVRT: 90 MSEC
LARGE PLATELETS: NORMAL
LDLC SERPL CALC-MCNC: 123 MG/DL (ref 0–100)
LDLC/HDLC SERPL: 1.65 {RATIO}
LEFT ATRIUM VOLUME INDEX: 19.3 ML/M2
LYMPHOCYTES # BLD AUTO: 2.02 10*3/MM3 (ref 0.7–3.1)
LYMPHOCYTES NFR BLD AUTO: 11.8 % (ref 19.6–45.3)
MAGNESIUM SERPL-MCNC: 1.7 MG/DL (ref 1.6–2.4)
MAXIMAL PREDICTED HEART RATE: 153 BPM
MAXIMAL PREDICTED HEART RATE: 153 BPM
MCH RBC QN AUTO: 29.3 PG (ref 26.6–33)
MCHC RBC AUTO-ENTMCNC: 32.7 G/DL (ref 31.5–35.7)
MCV RBC AUTO: 89.4 FL (ref 79–97)
MONOCYTES # BLD AUTO: 0.86 10*3/MM3 (ref 0.1–0.9)
MONOCYTES NFR BLD AUTO: 5 % (ref 5–12)
NEUTROPHILS NFR BLD AUTO: 13.58 10*3/MM3 (ref 1.7–7)
NEUTROPHILS NFR BLD AUTO: 79.5 % (ref 42.7–76)
NRBC BLD AUTO-RTO: 0 /100 WBC (ref 0–0.2)
NT-PROBNP SERPL-MCNC: 3791 PG/ML (ref 0–900)
PHOSPHATE SERPL-MCNC: 4.3 MG/DL (ref 2.5–4.5)
PLATELET # BLD AUTO: 303 10*3/MM3 (ref 140–450)
PMV BLD AUTO: 11.1 FL (ref 6–12)
POTASSIUM SERPL-SCNC: 3.7 MMOL/L (ref 3.5–5.2)
PROT SERPL-MCNC: 6.4 G/DL (ref 6–8.5)
PROTHROMBIN TIME: 10.6 SECONDS (ref 9.4–12)
RBC # BLD AUTO: 4.17 10*6/MM3 (ref 3.77–5.28)
RBC MORPH BLD: NORMAL
SMALL PLATELETS BLD QL SMEAR: ADEQUATE
SODIUM SERPL-SCNC: 140 MMOL/L (ref 136–145)
STRESS TARGET HR: 130 BPM
STRESS TARGET HR: 130 BPM
TRIGL SERPL-MCNC: 172 MG/DL (ref 0–150)
TROPONIN T SERPL-MCNC: 0.04 NG/ML (ref 0–0.03)
TROPONIN T SERPL-MCNC: 0.05 NG/ML (ref 0–0.03)
VLDLC SERPL-MCNC: 30 MG/DL (ref 5–40)
WBC MORPH BLD: NORMAL
WBC NRBC COR # BLD: 17.09 10*3/MM3 (ref 3.4–10.8)

## 2021-12-08 PROCEDURE — 85025 COMPLETE CBC W/AUTO DIFF WBC: CPT | Performed by: GENERAL PRACTICE

## 2021-12-08 PROCEDURE — 94799 UNLISTED PULMONARY SVC/PX: CPT

## 2021-12-08 PROCEDURE — 83880 ASSAY OF NATRIURETIC PEPTIDE: CPT | Performed by: STUDENT IN AN ORGANIZED HEALTH CARE EDUCATION/TRAINING PROGRAM

## 2021-12-08 PROCEDURE — 83605 ASSAY OF LACTIC ACID: CPT | Performed by: GENERAL PRACTICE

## 2021-12-08 PROCEDURE — 99233 SBSQ HOSP IP/OBS HIGH 50: CPT | Performed by: INTERNAL MEDICINE

## 2021-12-08 PROCEDURE — 93308 TTE F-UP OR LMTD: CPT

## 2021-12-08 PROCEDURE — 99233 SBSQ HOSP IP/OBS HIGH 50: CPT | Performed by: STUDENT IN AN ORGANIZED HEALTH CARE EDUCATION/TRAINING PROGRAM

## 2021-12-08 PROCEDURE — 83036 HEMOGLOBIN GLYCOSYLATED A1C: CPT | Performed by: GENERAL PRACTICE

## 2021-12-08 PROCEDURE — 85610 PROTHROMBIN TIME: CPT | Performed by: GENERAL PRACTICE

## 2021-12-08 PROCEDURE — 84100 ASSAY OF PHOSPHORUS: CPT | Performed by: GENERAL PRACTICE

## 2021-12-08 PROCEDURE — 25010000002 CEFTRIAXONE PER 250 MG: Performed by: GENERAL PRACTICE

## 2021-12-08 PROCEDURE — 85007 BL SMEAR W/DIFF WBC COUNT: CPT | Performed by: GENERAL PRACTICE

## 2021-12-08 PROCEDURE — 93308 TTE F-UP OR LMTD: CPT | Performed by: SPECIALIST

## 2021-12-08 PROCEDURE — 80061 LIPID PANEL: CPT | Performed by: GENERAL PRACTICE

## 2021-12-08 PROCEDURE — 84484 ASSAY OF TROPONIN QUANT: CPT | Performed by: GENERAL PRACTICE

## 2021-12-08 PROCEDURE — 99222 1ST HOSP IP/OBS MODERATE 55: CPT | Performed by: INTERNAL MEDICINE

## 2021-12-08 PROCEDURE — 83735 ASSAY OF MAGNESIUM: CPT | Performed by: GENERAL PRACTICE

## 2021-12-08 PROCEDURE — 80053 COMPREHEN METABOLIC PANEL: CPT | Performed by: GENERAL PRACTICE

## 2021-12-08 PROCEDURE — 85730 THROMBOPLASTIN TIME PARTIAL: CPT | Performed by: GENERAL PRACTICE

## 2021-12-08 RX ORDER — PANTOPRAZOLE SODIUM 40 MG/1
40 TABLET, DELAYED RELEASE ORAL EVERY MORNING
Status: DISCONTINUED | OUTPATIENT
Start: 2021-12-09 | End: 2021-12-09 | Stop reason: HOSPADM

## 2021-12-08 RX ORDER — FLUTICASONE PROPIONATE 50 MCG
2 SPRAY, SUSPENSION (ML) NASAL DAILY
Status: DISCONTINUED | OUTPATIENT
Start: 2021-12-09 | End: 2021-12-09 | Stop reason: HOSPADM

## 2021-12-08 RX ORDER — METOPROLOL SUCCINATE 25 MG/1
25 TABLET, EXTENDED RELEASE ORAL DAILY
Status: DISCONTINUED | OUTPATIENT
Start: 2021-12-08 | End: 2021-12-09 | Stop reason: HOSPADM

## 2021-12-08 RX ORDER — HYDROCODONE BITARTRATE AND ACETAMINOPHEN 5; 325 MG/1; MG/1
1 TABLET ORAL EVERY 4 HOURS PRN
Status: DISCONTINUED | OUTPATIENT
Start: 2021-12-08 | End: 2021-12-09 | Stop reason: HOSPADM

## 2021-12-08 RX ORDER — LISINOPRIL 10 MG/1
10 TABLET ORAL DAILY
Status: DISCONTINUED | OUTPATIENT
Start: 2021-12-08 | End: 2021-12-09 | Stop reason: HOSPADM

## 2021-12-08 RX ADMIN — BUDESONIDE 0.5 MG: 0.5 SUSPENSION RESPIRATORY (INHALATION) at 20:59

## 2021-12-08 RX ADMIN — BUDESONIDE 0.5 MG: 0.5 SUSPENSION RESPIRATORY (INHALATION) at 07:40

## 2021-12-08 RX ADMIN — CEFTRIAXONE SODIUM 1 G: 1 INJECTION, SOLUTION INTRAVENOUS at 17:28

## 2021-12-08 RX ADMIN — ARFORMOTEROL TARTRATE 15 MCG: 15 SOLUTION RESPIRATORY (INHALATION) at 07:46

## 2021-12-08 RX ADMIN — SODIUM CHLORIDE, PRESERVATIVE FREE 10 ML: 5 INJECTION INTRAVENOUS at 08:54

## 2021-12-08 RX ADMIN — ATORVASTATIN CALCIUM 10 MG: 10 TABLET, FILM COATED ORAL at 20:16

## 2021-12-08 RX ADMIN — HYDROCODONE BITARTRATE AND ACETAMINOPHEN 1 TABLET: 5; 325 TABLET ORAL at 05:58

## 2021-12-08 RX ADMIN — APIXABAN 10 MG: 5 TABLET, FILM COATED ORAL at 08:51

## 2021-12-08 RX ADMIN — LISINOPRIL 10 MG: 10 TABLET ORAL at 20:15

## 2021-12-08 RX ADMIN — METOPROLOL SUCCINATE 25 MG: 25 TABLET, EXTENDED RELEASE ORAL at 20:16

## 2021-12-08 RX ADMIN — APIXABAN 10 MG: 5 TABLET, FILM COATED ORAL at 20:15

## 2021-12-08 RX ADMIN — ARFORMOTEROL TARTRATE 15 MCG: 15 SOLUTION RESPIRATORY (INHALATION) at 20:59

## 2021-12-08 RX ADMIN — SODIUM CHLORIDE, PRESERVATIVE FREE 10 ML: 5 INJECTION INTRAVENOUS at 20:16

## 2021-12-08 RX ADMIN — HYDROCODONE BITARTRATE AND ACETAMINOPHEN 1 TABLET: 5; 325 TABLET ORAL at 10:41

## 2021-12-08 RX ADMIN — HYDROCODONE BITARTRATE AND ACETAMINOPHEN 1 TABLET: 5; 325 TABLET ORAL at 20:23

## 2021-12-08 RX ADMIN — SENNOSIDES AND DOCUSATE SODIUM 2 TABLET: 50; 8.6 TABLET ORAL at 08:51

## 2021-12-08 NOTE — PLAN OF CARE
Goal Outcome Evaluation:  Plan of Care Reviewed With: patient        Progress: no change  Outcome Summary: Patient arrived to the floor from the ED, moderate SOA on exertion, ambulating from stretcher to bed and talking during admission. Patient continues to be SOA while speaking. C/O pain in right shoulder described as pressure and aching feeling. Stopped heparin drip at 0007 because lab called and could not obtain a stable clot. Will resume when aPTT returns below 71. No other complaints or concerns this shift.

## 2021-12-08 NOTE — CASE MANAGEMENT/SOCIAL WORK
Discharge Planning Assessment   [facility]     Patient Name: Patsy Tobin  MRN: [id number]  Today's Date: 12/8/2021    Admit Date: 12/7/2021     Discharge Needs Assessment     Row Name 12/08/21 1342       Living Environment    Lives With spouse    Current Living Arrangements home/apartment/condo    Primary Care Provided by self    Provides Primary Care For spouse    Family Caregiver if Needed child(naya), adult    Family Caregiver Names Pts daughter Pauly is going to be staying with pt after dc.    Quality of Family Relationships helpful; involved; supportive    Able to Return to Prior Arrangements yes       Resource/Environmental Concerns    Resource/Environmental Concerns none       Transition Planning    Patient/Family Anticipates Transition to home with family    Patient/Family Anticipated Services at Transition durable medical equipment    Transportation Anticipated family or friend will provide       Discharge Needs Assessment    Readmission Within the Last 30 Days no previous admission in last 30 days    Equipment Currently Used at Home walker, standard  Pt has walker but not needed.    Concerns to be Addressed discharge planning; medication    Equipment Needed After Discharge oxygen               Discharge Plan     Row Name 12/08/21 2824       Plan    Plan Pt alert and oriented. Covid negative. Pt is independent at home, is primary caregiver for spouse. Pt will dc home with Eliquis for PE. Possible walk test, neb at dc. Pt states she was on Eliquis in Jan for post hip sx. Pt denied any issue affording medication. Pts daughter Pauly will be staying with pt after dc. Pt sees Dr Francisco Aguilera in [facility] for cardio. Pt and pts daughter currently deny any other dc needs at this time.              Continued Care and Services - Admitted Since 12/7/2021    Coordination has not been started for this encounter.          Demographic Summary     Row Name 12/08/21 6886       General Information    Admission Type  inpatient    Arrived From emergency department    Reason for Consult discharge planning    Preferred Language English     Used During This Interaction no  Pts daughter Pauly at bedside.       Contact Information    Permission Granted to Share Info With family/designee               Functional Status     Row Name 12/08/21 1342       Functional Status    Usual Activity Tolerance good    Current Activity Tolerance good       Functional Status, IADL    Medications independent    Meal Preparation independent    Housekeeping independent    Laundry independent    Shopping independent       Mental Status    General Appearance WDL WDL       Mental Status Summary    Recent Changes in Mental Status/Cognitive Functioning no changes               Psychosocial    No documentation.                Abuse/Neglect    No documentation.                Legal    No documentation.                Substance Abuse    No documentation.                Patient Forms    No documentation.                   Pauly Geronimo RN

## 2021-12-08 NOTE — PROGRESS NOTES
Pulmonary / Critical Care Progress Note      Patient Name: Patsy Tobin  : 1955  MRN: [id number]  Primary Care Physician:  Annetta Austin APRN  Date of admission: 2021    Subjective   Subjective   Follow-up for pulmonary embolism, respiratory failure.    Over past 24 hours: Was started on heparin drip, lower extremity ultrasound was done.  Remains on supplemental oxygen.    No acute events overnight.     This morning,   Feels some better.  Still short of breath with minimal activities.  Was able to move around in room.  Has cough, does not produce much phlegm.  No nausea or vomiting.  No fever or chills.      Review of Systems  Constitutional symptoms:   Fatigue, weakness, otherwise denied complaints   Ear, nose, throat: Denied complaints  Cardiovascular:   Chest tightness, chest discomfort, otherwise denied complaints  Respiratory: Shortness of breath with minimal exertion, otherwise denied complaints  Gastrointestinal: Denied complaints  Musculoskeletal: Denied complaints  Genitourinary: Denied complaints  Allergy / Immunology: Denied complaints  Hematologic: Denied complaints  Neurologic: Denied complaints  Endocrine: Denied complaints  Psychiatric: Denied complaints      Objective   Objective     Vitals:   Temp:  [97.7 °F (36.5 °C)-98 °F (36.7 °C)] 97.7 °F (36.5 °C)  Heart Rate:  [55-76] 72  Resp:  [16-20] 18  BP: (105-178)/() 105/67  Flow (L/min):  [2-4] 3  Physical Exam   Vital Signs Reviewed   WDWN, Alert, NAD.    HEENT:  PERRL, EOMI.  OP, nares clear, no sinus tenderness  Neck:  Supple, no JVD, no thyromegaly  Chest:  good aeration, clear to auscultation bilaterally, tympanic to percussion bilaterally, no work of breathing noted  CV: RRR, no MGR, pulses 2+, equal.  Abd:  Soft, NT, ND, + BS, no HSM  EXT:  no clubbing, no cyanosis, no edema  Neuro:  A&Ox3, CN grossly intact, no focal deficits.  Skin: No rashes or lesions noted      Result Review    Result Review:  I have  personally reviewed the results from the time of this admission to 12/8/2021 13:23 EST and agree with these findings:  [x]  Laboratory  [x]  Microbiology  [x]  Radiology  [x]  EKG/Telemetry   []  Cardiology/Vascular   []  Pathology  []  Old records  []  Other:  Most notable findings include: Lower extremity ultrasound with DVT in left leg.    Assessment/Plan   Assessment / Plan     Active Hospital Problems:  Active Hospital Problems    Diagnosis    • Pulmonary embolism (HCC)          Impression:   Acute pulmonary embolism, with extensive clot burden  ?  History of A. Fib  Acute hypoxic respite failure    Plan:  Switched to Eliquis 10 mg twice daily orally.  Will need anticoagulation for at least 6 to 12 months.  Hypercoagulability work-up sent.  Echocardiogram ordered.  Lower extremity ultrasound shows DVT in the left leg.  Possible that blood clot was related to long distance travel early November.  Continue supplemental oxygen to keep saturations more than 90%.  So far, patient does not have A. fib on monitor.  Cardiology on board.     Reviewed labs, imaging and provider notes.  Discussed with bedside nurse and primary service.       DVT prophylaxis:  Medical and mechanical DVT prophylaxis orders are present.    CODE STATUS:   Code Status (Patient has no pulse and is not breathing): CPR (Attempt to Resuscitate)  Medical Interventions (Patient has pulse or is breathing): Full Support    I personally reviewed pertinent labs, imaging and provider notes. Discussed with bedside nurse and will discuss with primary service.       Electronically signed by Richard Gentile MD, 12/8/2021, 13:23 EST.

## 2021-12-08 NOTE — PLAN OF CARE
Goal Outcome Evaluation:    HEPARIN DC THIS SHIFT. PT CONVERTED TO PO ELIQUIS. TOLERATED WELL. C/O SHOULDER PAIN THIS AM. NO COMPLAINTS SINCE. VSS. PT REMAINS ON 4L AND DOES STATE SHE FEELS SOA WITH EXERTION.                   <<----- Click to add NO pertinent Past Medical History No pertinent past medical history

## 2021-12-08 NOTE — CONSULTS
[facility]   Cardiology Consult Note    Patient Name: Patsy Tobin  : 1955  MRN: [id number]  Primary Care Physician:  Annetta Austin APRN  Referring Physician: Damaris Torres MD  Date of admission: 2021    Subjective   Subjective     Reason for Consult/ Chief Complaint: Shortness of breath, back pain    HPI:  Patsy oTbin is a 66 y.o. female with past medical history significant for hypertension and obesity, presents to the hospital with shortness of breath. She has been having shortness of breath and dyspnea with mild effort for the last couple of weeks. Her symptoms seems to have started after road trip back from [place]. She was evaluated at [facility] and was diagnosed with pneumonia. She was treated with antibiotics and steroids without improvement of her symptoms. She continued to have progressive shortness of breath which prompted her to come to our ER. CT scan of the lungs with contrast demonstrated bilateral pulmonary emboli. She is currently on anticoagulation with heparin. She feels slightly better today although she continues to have shortness of breath. She has no angina, orthopnea, palpitations, presyncope or syncope. She is wearing an event monitor for evaluation of tachyarrhythmias.    Review of Systems   All systems were reviewed and negative except as above    Personal History     Past Medical History:   Diagnosis Date   • Abdominal pain of multiple sites    • Allergic rhinitis    • AMEYA positive    • Blurred vision, bilateral    • Deep vein thrombosis (HCC)    • Depression    • Diarrhea    • Esophagitis    • Fatigue    • Gastric ulcer    • GERD (gastroesophageal reflux disease)    • Headache    • High blood pressure    • Hypertension    • Leg cramps    • Leg wound, right    • Mitral valve prolapse    • Multiple joint pain    • Overweight    • Peptic ulceration    • PONV (postoperative nausea and vomiting)    • RA (rheumatoid arthritis) (HCC)    •  Superficial phlebitis     in left ankle    • Tattoos     permanent makeup         Family History: family history includes Cancer in her father; Colon polyps in her brother; Diabetes in her maternal grandmother; Hypertension in her mother. Otherwise pertinent FHx was reviewed and not pertinent to current issue.    Social History:  reports that she quit smoking about 33 years ago. Her smoking use included cigarettes. She has a 22.00 pack-year smoking history. She has never used smokeless tobacco. She reports current alcohol use. She reports that she does not use drugs.    Home Medications:  HYDROcodone-acetaminophen, Vortioxetine HBr, alosetron, aspirin, etanercept, fluticasone, hyoscyamine, lisinopril, lovastatin, meloxicam, metoprolol succinate XL, omeprazole, and predniSONE    Allergies:  Allergies   Allergen Reactions   • Celebrex [Celecoxib] Other (See Comments)     Passed Out    • Arava [Leflunomide] Other (See Comments)     Altered Glucose Levels   • Duloxetine Hallucinations   • Naproxen GI Intolerance     All NSAIDS    • Plaquenil [Hydroxychloroquine Sulfate] Mental Status Change       Objective    Objective     Vitals:   Temp:  [97.5 °F (36.4 °C)-98 °F (36.7 °C)] 98 °F (36.7 °C)  Heart Rate:  [55-87] 76  Resp:  [16-24] 18  BP: (133-225)/() 133/71  Flow (L/min):  [2-4] 3      Physical Exam:   Constitutional: Awake, alert, No acute distress    Eyes: PERRLA, sclerae anicteric, no conjunctival injection   HENT: NCAT, mucous membranes moist   Neck: Supple, no thyromegaly, no lymphadenopathy, trachea midline   Respiratory: Bibasilar crackles, more on the left side, nonlabored respirations    Cardiovascular: RRR, no murmurs, rubs, or gallops, palpable pedal pulses bilaterally   Gastrointestinal: Positive bowel sounds, soft, nontender, nondistended   Musculoskeletal: No bilateral ankle edema, no clubbing or cyanosis to extremities   Psychiatric: Appropriate affect, cooperative   Neurologic: Oriented x 3,  strength symmetric in all extremities, Cranial Nerves grossly intact to confrontation, speech clear   Skin: No rashes     Result Review    Result Review:  I have personally reviewed the results from the time of this admission to 12/8/2021 10:36 EST and agree with these findings:  [x]  Laboratory  []  Microbiology  [x]  Radiology  [x]  EKG/Telemetry   [x]  Cardiology/Vascular   []  Pathology  [x]  Old records  []  Other:  Most notable findings include:     CMP    CMP 11/25/21 12/7/21 12/7/21 12/8/21     1300 2205    Glucose 100 (A) 106 (A) 172 (A) 126 (A)   BUN 32 (A) 36 (A) 36 (A) 36 (A)   Creatinine 2.08 (A) 1.60 (A) 1.63 (A) 1.45 (A)   eGFR Non  Am 24 (A) 32 (A) 31 (A) 36 (A)   Sodium 141 142 143 140   Potassium 4.5 4.7 3.9 3.7   Chloride 107 110 (A) 109 (A) 110 (A)   Calcium 9.0 9.9 9.3 9.0   Albumin 4.40 4.20  3.50   Total Bilirubin 0.3 0.4  0.2   Alkaline Phosphatase 69 94  79   AST (SGOT) 15 20  13   ALT (SGPT) 17 12  10   (A) Abnormal value       Comments are available for some flowsheets but are not being displayed.            CBC    CBC 11/25/21 12/7/21 12/7/21 12/8/21     1300 2205    WBC 12.40 (A) 20.78 (A) 20.33 (A) 17.09 (A)   RBC 3.82 4.46 4.09 4.17   Hemoglobin 11.5 (A) 13.2 12.2 12.2   Hematocrit 34.3 38.7 36.4 37.3   MCV 89.8 86.8 89.0 89.4   MCH 30.1 29.6 29.8 29.3   MCHC 33.5 34.1 33.5 32.7   RDW 13.6 13.9 14.0 14.2   Platelets 227 324 349 303   (A) Abnormal value             Lab Results   Component Value Date    TROPONINT 0.043 (C) 12/08/2021         Assessment/Plan   Assessment / Plan       Active Hospital Problems:  -Bilateral pulmonary emboli and left lower extremity DVT, more likely provoked by recent long road trip.  -Slight elevated troponin, peaked at 0.051 and elevated BNP, more like related to acute pulmonary emboli.  -Recent history of pneumonia  -Hypertension, stable  -Dyslipidemia, on statin therapy  -Chronic kidney disease, creatinine stable.    Plan:   -Continue  anticoagulation as per primary team.  -Echocardiogram was ordered and will be reviewed.  -She is wearing an event monitor for assessment of possible A. fib that was suggested by her watch although it was not necessarily accurate.  There has been no evidence of atrial fibrillation on cardiac monitoring so far.  -Continue metoprolol and statin      Thank you for the consultation.  If echo is unremarkable, no further cardiac intervention would be indicated at this point.  Please do not hesitate to call us with further questions or concerns.      Electronically signed by Francisco Aguilera MD, 12/08/21, 10:36 AM EST.

## 2021-12-08 NOTE — PROGRESS NOTES
[facility]   Hospitalist Progress Note  Date: 2021  Patient Name: Patsy Tobin  : 1955  MRN: [id number]  Date of admission: 2021      Subjective   Subjective     Chief Complaint: Shortness of breath    Summary: 66-year-old female present with shortness of breath subsequently found to have bilateral pulmonary emboli. Patient has been started on heparin drip and pulmonary is currently following. Additionally cardiology has been consulted given patient's elevated troponin.    Interval Followup: No events overnight. Patient on supplemental oxygen. She still appears dyspneic. She becomes more short of breath with exertion. She says that she has not had a productive cough. She denies any hemoptysis. She has no current chest pain, chest pressure, or palpitations. She still complains of being severely weak. 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 Her daughter is at bedside updated on plan of care. Patient denies any nausea, episodes emesis, fever, or chills. Per nursing patient's cardiomyopathy service negative, will discontinue enhanced airborne precautions.  Review of Systems  All systems reviewed and negative except as above in HPI.    Objective   Objective     Vitals:   Temp:  [97.5 °F (36.4 °C)-97.8 °F (36.6 °C)] 97.8 °F (36.6 °C)  Heart Rate:  [55-87] 55  Resp:  [20-24] 20  BP: (143-225)/() 143/67  Flow (L/min):  [2] 2  Physical Exam   Constitutional: Alert, awake, appears weak, acutely ill  HEENT:  PERRLA, EOMI; No Scleral icterus  Neck:  Supple; No Mass, No Lymphadenopathy  Cardiovascular:  No Rubs, No Edema, No JVD  Respiratory: Rhonchi bilaterally, no wheezing, faint bibasilar crackles, on nasal cannula  Abdomen:  Normal BS all 4 Quadrants; No Guarding, No Tenderness  Musculoskeletal:  Pulses Positive all 4 Ext; No Cyanosis, No Edema  Neurological:  Alert+Ox3, Mental status WNL; No Dysarthria   Skin:  No Rash, No Cellulitis, No  Erythema    Result Review    Result Review:  I have personally reviewed the results from the time of this admission to 12/8/2021 07:38 EST and agree with these findings:  [x]  Laboratory  [x]  Microbiology  [x]  Radiology  [x]  EKG/Telemetry   []  Cardiology/Vascular   []  Pathology  [x]  Old records  []  Other:    Assessment/Plan   Assessment / Plan     Assessment:  Acute submassive bilateral pulmonary emboli  Acute respiratory failure with hypoxia secondary to pulmonary emboli  Acute left lower extremity DVT and distal femoral, popliteal, and posterior tibial vein  Questionable history of atrial fibrillation  NSTEMI, likely type II demand ischemia secondary to pulmonary embolus setting of hypoxia  Suspect chronic kidney disease stage III  Lactic acidosis  Leukocytosis, likely reactive secondary to pulmonary embolus  History of rheumatoid arthritis  Hyperlipidemia  Morbid obesity, BMI 30.39    Plan:  Continue with supplemental oxygen, O2 saturation greater 90%, wean as tolerated  Continue heparin drip given bilateral PE and DVTs  At this time there are no concerns for bleeding, suspect patient can be started on anticoagulation in the next day or so  Cardiology following, appreciate assistance with this patient  Await results of echocardiogram  Leukocytosis improving, continue to monitor, recheck CBC in a.m.  Renal function remained stable from patient's baseline, continue to monitor, she will need to follow-up with a nephrologist upon discharge  No atrial fibrillation on monitor, patient has event monitor that apparently has not showed atrial fibrillation either, unsure as to where the suspected diagnosis came from, continue with metoprolol for now per cardiology  Continue with Norco for pain control  Labs reviewed, records reviewed, image reviewed, vital signs reviewed, medications reviewed  Further recommendations pending clinical course      Discussed plan with cardiology, RN.    DVT prophylaxis:  Medical and  mechanical DVT prophylaxis orders are present.    CODE STATUS:   Code Status (Patient has no pulse and is not breathing): CPR (Attempt to Resuscitate)  Medical Interventions (Patient has pulse or is breathing): Full Support        Electronically signed by Joselito Toro DO, 12/08/21, 7:38 AM EST.

## 2021-12-09 ENCOUNTER — READMISSION MANAGEMENT (OUTPATIENT)
Dept: CALL CENTER | Facility: HOSPITAL | Age: 66
End: 2021-12-09

## 2021-12-09 VITALS
DIASTOLIC BLOOD PRESSURE: 68 MMHG | OXYGEN SATURATION: 94 % | HEART RATE: 60 BPM | SYSTOLIC BLOOD PRESSURE: 130 MMHG | RESPIRATION RATE: 18 BRPM | TEMPERATURE: 97.6 F | WEIGHT: 194 LBS | BODY MASS INDEX: 30.45 KG/M2 | HEIGHT: 67 IN

## 2021-12-09 LAB
ALBUMIN SERPL-MCNC: 3.1 G/DL (ref 3.5–5.2)
ALBUMIN/GLOB SERPL: 1.1 G/DL
ALP SERPL-CCNC: 68 U/L (ref 39–117)
ALT SERPL W P-5'-P-CCNC: 8 U/L (ref 1–33)
ANION GAP SERPL CALCULATED.3IONS-SCNC: 11.1 MMOL/L (ref 5–15)
AST SERPL-CCNC: 9 U/L (ref 1–32)
BILIRUB SERPL-MCNC: 0.2 MG/DL (ref 0–1.2)
BUN SERPL-MCNC: 45 MG/DL (ref 8–23)
BUN/CREAT SERPL: 28 (ref 7–25)
CALCIUM SPEC-SCNC: 8.7 MG/DL (ref 8.6–10.5)
CARDIOLIPIN IGA SER IA-ACNC: <9 APL U/ML (ref 0–11)
CARDIOLIPIN IGG SER IA-ACNC: <9 GPL U/ML (ref 0–14)
CARDIOLIPIN IGM SER IA-ACNC: 15 MPL U/ML (ref 0–12)
CHLORIDE SERPL-SCNC: 109 MMOL/L (ref 98–107)
CO2 SERPL-SCNC: 19.9 MMOL/L (ref 22–29)
CREAT SERPL-MCNC: 1.61 MG/DL (ref 0.57–1)
DEPRECATED RDW RBC AUTO: 47.3 FL (ref 37–54)
EOSINOPHIL # BLD MANUAL: 0.58 10*3/MM3 (ref 0–0.4)
EOSINOPHIL NFR BLD MANUAL: 4 % (ref 0.3–6.2)
ERYTHROCYTE [DISTWIDTH] IN BLOOD BY AUTOMATED COUNT: 14.4 % (ref 12.3–15.4)
GFR SERPL CREATININE-BSD FRML MDRD: 32 ML/MIN/1.73
GLOBULIN UR ELPH-MCNC: 2.7 GM/DL
GLUCOSE SERPL-MCNC: 80 MG/DL (ref 65–99)
HCT VFR BLD AUTO: 33.9 % (ref 34–46.6)
HGB BLD-MCNC: 11 G/DL (ref 12–15.9)
INR PPP: 1.04 (ref 2–3)
LYMPHOCYTES # BLD MANUAL: 3.63 10*3/MM3 (ref 0.7–3.1)
LYMPHOCYTES NFR BLD MANUAL: 12 % (ref 5–12)
MAGNESIUM SERPL-MCNC: 1.6 MG/DL (ref 1.6–2.4)
MCH RBC QN AUTO: 29.4 PG (ref 26.6–33)
MCHC RBC AUTO-ENTMCNC: 32.4 G/DL (ref 31.5–35.7)
MCV RBC AUTO: 90.6 FL (ref 79–97)
MONOCYTES # BLD: 1.74 10*3/MM3 (ref 0.1–0.9)
NEUTROPHILS # BLD AUTO: 8.57 10*3/MM3 (ref 1.7–7)
NEUTROPHILS NFR BLD MANUAL: 58 % (ref 42.7–76)
NEUTS BAND NFR BLD MANUAL: 1 % (ref 0–5)
PHOSPHATE SERPL-MCNC: 3.9 MG/DL (ref 2.5–4.5)
PLATELET # BLD AUTO: 289 10*3/MM3 (ref 140–450)
PMV BLD AUTO: 11 FL (ref 6–12)
POTASSIUM SERPL-SCNC: 3.7 MMOL/L (ref 3.5–5.2)
PROT SERPL-MCNC: 5.8 G/DL (ref 6–8.5)
PROTHROMBIN TIME: 10.9 SECONDS (ref 9.4–12)
RBC # BLD AUTO: 3.74 10*6/MM3 (ref 3.77–5.28)
RBC MORPH BLD: NORMAL
SCAN SLIDE: NORMAL
SMALL PLATELETS BLD QL SMEAR: ADEQUATE
SODIUM SERPL-SCNC: 140 MMOL/L (ref 136–145)
VARIANT LYMPHS NFR BLD MANUAL: 18 % (ref 19.6–45.3)
VARIANT LYMPHS NFR BLD MANUAL: 7 % (ref 0–5)
WBC MORPH BLD: NORMAL
WBC NRBC COR # BLD: 14.52 10*3/MM3 (ref 3.4–10.8)

## 2021-12-09 PROCEDURE — 80053 COMPREHEN METABOLIC PANEL: CPT | Performed by: STUDENT IN AN ORGANIZED HEALTH CARE EDUCATION/TRAINING PROGRAM

## 2021-12-09 PROCEDURE — 99239 HOSP IP/OBS DSCHRG MGMT >30: CPT | Performed by: STUDENT IN AN ORGANIZED HEALTH CARE EDUCATION/TRAINING PROGRAM

## 2021-12-09 PROCEDURE — 84100 ASSAY OF PHOSPHORUS: CPT | Performed by: GENERAL PRACTICE

## 2021-12-09 PROCEDURE — 36415 COLL VENOUS BLD VENIPUNCTURE: CPT | Performed by: STUDENT IN AN ORGANIZED HEALTH CARE EDUCATION/TRAINING PROGRAM

## 2021-12-09 PROCEDURE — 94760 N-INVAS EAR/PLS OXIMETRY 1: CPT

## 2021-12-09 PROCEDURE — 83735 ASSAY OF MAGNESIUM: CPT | Performed by: GENERAL PRACTICE

## 2021-12-09 PROCEDURE — 99232 SBSQ HOSP IP/OBS MODERATE 35: CPT | Performed by: INTERNAL MEDICINE

## 2021-12-09 PROCEDURE — 85610 PROTHROMBIN TIME: CPT | Performed by: GENERAL PRACTICE

## 2021-12-09 PROCEDURE — 99233 SBSQ HOSP IP/OBS HIGH 50: CPT | Performed by: INTERNAL MEDICINE

## 2021-12-09 PROCEDURE — 94799 UNLISTED PULMONARY SVC/PX: CPT

## 2021-12-09 PROCEDURE — 85025 COMPLETE CBC W/AUTO DIFF WBC: CPT | Performed by: GENERAL PRACTICE

## 2021-12-09 PROCEDURE — 85007 BL SMEAR W/DIFF WBC COUNT: CPT | Performed by: GENERAL PRACTICE

## 2021-12-09 RX ORDER — IPRATROPIUM BROMIDE AND ALBUTEROL SULFATE 2.5; .5 MG/3ML; MG/3ML
3 SOLUTION RESPIRATORY (INHALATION) EVERY 4 HOURS PRN
Qty: 360 ML | Refills: 0 | Status: SHIPPED | OUTPATIENT
Start: 2021-12-09 | End: 2022-03-03 | Stop reason: SDUPTHER

## 2021-12-09 RX ADMIN — SODIUM CHLORIDE, PRESERVATIVE FREE 10 ML: 5 INJECTION INTRAVENOUS at 08:46

## 2021-12-09 RX ADMIN — VORTIOXETINE 5 MG: 5 TABLET, FILM COATED ORAL at 08:46

## 2021-12-09 RX ADMIN — ARFORMOTEROL TARTRATE 15 MCG: 15 SOLUTION RESPIRATORY (INHALATION) at 08:17

## 2021-12-09 RX ADMIN — APIXABAN 10 MG: 5 TABLET, FILM COATED ORAL at 08:45

## 2021-12-09 RX ADMIN — PANTOPRAZOLE SODIUM 40 MG: 40 TABLET, DELAYED RELEASE ORAL at 06:10

## 2021-12-09 RX ADMIN — HYDROCODONE BITARTRATE AND ACETAMINOPHEN 1 TABLET: 5; 325 TABLET ORAL at 08:46

## 2021-12-09 RX ADMIN — BUDESONIDE 0.5 MG: 0.5 SUSPENSION RESPIRATORY (INHALATION) at 08:17

## 2021-12-09 NOTE — DISCHARGE SUMMARY
[facility]         HOSPITALIST  DISCHARGE SUMMARY    Patient Name: Patsy Tobin  : 1955  MRN: [id number]    Date of Admission: 2021  Date of Discharge: 2021  Primary Care Physician: Annetta Austin APRN    Consults     Date and Time Order Name Status Description    2021  8:58 AM IP Consult to Cardiology Completed     2021  7:42 AM Inpatient Pulmonology Consult      2021  3:20 PM Inpatient Pulmonology Consult Completed     2021  2:52 PM Inpatient Hospitalist Consult            Active and Resolved Hospital Problems:  Active Hospital Problems    Diagnosis POA   • Pulmonary embolism (HCC) [I26.99] Yes      Resolved Hospital Problems   No resolved problems to display.       Hospital Course     Hospital Course:  Patsy Tobin is a 66 y.o. female who presented with a chief complaint of shortness of breath subsequently underwent work-up which included a CT chest that showed her to have evidence of multiple bilateral pulmonary emboli.  Patient was started on heparin drip and received supplemental oxygen due to hypoxia.  She did have a lower extremity duplex that showed a acute left lower extremity DVT in the distal femoral, popliteal, and posterior tibial veins.  Cardiology was consulted as patient had an elevated troponin.  After evaluation it was deemed that patient's troponin elevation was likely due to her hypoxia and acute pulmonary emboli.  Patient then had an echocardiogram that showed elevated RV pressures with normal ejection fraction.  There is no evidence of any right ventricular strain.  Ultimately patient was transition to [facility] and will continue this for 6 to 12 months.  Her hemoglobin was stable at the time of discharge.  She will continue with metoprolol per cardiology recommendations.  She will continue with her other medications as before.  Patient stated that she did not have a PCP currently therefore an appointment will be made.   Additionally patient will have an appointment made to follow-up with cardiology and pulmonology.    DISCHARGE Follow Up Recommendations for labs and diagnostics: PCP 1 week.  Cardiology and pulmonary as scheduled.      Day of Discharge     Vital Signs:  Temp:  [97.4 °F (36.3 °C)-98.1 °F (36.7 °C)] 97.6 °F (36.4 °C)  Heart Rate:  [51-68] 60  Resp:  [15-18] 18  BP: (118-144)/(52-68) 130/68  Flow (L/min):  [2-3] 2  Physical Exam:   Constitutional: Alert, awake, no acute distress  HEENT:  PERRLA, EOMI; No Scleral icterus  Neck:  Supple; No Mass, No Lymphadenopathy  Cardiovascular:  No Rubs, No Edema, No JVD  Respiratory: No respiratory distress, unlabored breathing, saturating well on nasal cannula  Abdomen:  Normal BS all 4 Quadrants; No Guarding, No Tenderness  Musculoskeletal:  Pulses Positive all 4 Ext; No Cyanosis, No Edema  Neurological:  Alert+Ox3, Mental status WNL; No Dysarthria  Skin:  No Rash, No Cellulitis, No Erythema      Discharge Details        Discharge Medications      New Medications      Instructions Start Date   apixaban 5 MG tablet tablet  Commonly known as: ELIQUIS   Take 2 tabs twice a day for 6 days then take 1 tab twice a day thereafter.      fluticasone-salmeterol 250-50 MCG/DOSE DISKUS  Commonly known as: Advair Diskus   1 puff, Inhalation, 2 Times Daily - RT      ipratropium-albuterol 0.5-2.5 mg/3 ml nebulizer  Commonly known as: DUO-NEB   3 mL, Nebulization, Every 4 Hours PRN         Continue These Medications      Instructions Start Date   alosetron 0.5 MG tablet  Commonly known as: LOTRONEX   0.5 mg, Oral, 2 Times Daily PRN      aspirin 81 MG chewable tablet   81 mg, Oral, Daily      Enbrel 50 MG/ML solution prefilled syringe injection  Generic drug: etanercept   50 mg, Subcutaneous, Weekly      fluticasone 50 MCG/ACT nasal spray  Commonly known as: FLONASE   2 sprays, Nasal, Daily      HYDROcodone-acetaminophen 5-325 MG per tablet  Commonly known as: NORCO   1 tablet, Oral, Every 6 Hours  PRN      hyoscyamine 0.125 MG SL tablet  Commonly known as: LEVSIN   0.125 mg, Oral, Every 6 Hours PRN      lisinopril 10 MG tablet  Commonly known as: PRINIVIL,ZESTRIL   10 mg, Oral, Daily      lovastatin 20 MG tablet  Commonly known as: MEVACOR   20 mg, Oral, Nightly      meloxicam 15 MG tablet  Commonly known as: MOBIC   15 mg, Oral, Daily      metoprolol succinate XL 25 MG 24 hr tablet  Commonly known as: TOPROL-XL   25 mg, Oral, Daily      omeprazole 40 MG capsule  Commonly known as: priLOSEC   Take 1 capsule by mouth once daily      predniSONE 20 MG tablet  Commonly known as: DELTASONE   Take As Directed      Trintellix 5 MG tablet  Generic drug: Vortioxetine HBr   5 mg, Oral, Daily             Allergies   Allergen Reactions   • Celebrex [Celecoxib] Other (See Comments)     Passed Out    • Arava [Leflunomide] Other (See Comments)     Altered Glucose Levels   • Duloxetine Hallucinations   • Naproxen GI Intolerance     All NSAIDS    • Plaquenil [Hydroxychloroquine Sulfate] Mental Status Change       Discharge Disposition:  Home-Health Care [facility] [place]    Diet:  Hospital:  Diet Order   Procedures   • Diet Regular; Cardiac, Consistent Carbohydrate, Renal       Discharge Activity:       CODE STATUS:  Code Status and Medical Interventions:   Ordered at: 12/08/21 0732     Code Status (Patient has no pulse and is not breathing):    CPR (Attempt to Resuscitate)     Medical Interventions (Patient has pulse or is breathing):    Full Support         Future Appointments   Date Time Provider Department Center   1/28/2022  3:00 PM Francisco Aguilera [place] [facility] [facility]       Additional Instructions for the Follow-ups that You Need to Schedule     Discharge Follow-up with PCP   As directed       Currently Documented PCP:    Annetta Austin APRN    PCP Phone Number:    [phone]     Follow Up Details: requesting Nitesh Jones         Discharge Follow-up with Specified Provider: Dr. Gentile; 2 Weeks   As directed      To:   Krupa    Follow Up: 2 Weeks         Discharge Follow-up with Specified Provider: danilo Tabares cardiology; 2 Weeks   As directed      To: danilo Tabares cardiology    Follow Up: 2 Weeks               Pertinent  and/or Most Recent Results     PROCEDURES:   CT Abdomen Pelvis Without Contrast    Result Date: 11/26/2021  Narrative: Patient: JACKSON CORTÉS  Time Out: 00:30 Exam(s): CT ABDOMEN + PELVIS Without Contrast EXAM:   CT Abdomen and Pelvis Without Intravenous Contrast CLINICAL HISTORY:    Reason for exam: Right upper quadrant and right-sided chest pain. TECHNIQUE:   Axial computed tomography images of the abdomen and pelvis without intravenous contrast.  CTDI is 18.4 mGy and DLP is 1293.6 mGy-cm.  This CT exam was performed according to the principle of ALARA (As Low As Reasonably Achievable) by using one or more of the following dose reduction techniques: automated exposure control, adjustment of the mA and or kV according to patient size, and or use of iterative reconstruction technique. COMPARISON:   No relevant prior studies available. FINDINGS:   Lung bases:  See below.    Pleural space:  There is a small right pleural effusion and minimal right basal atelectasis.  ABDOMEN:   Liver:  Unremarkable.   Gallbladder and bile ducts:  Unremarkable.  No calcified stones.  No ductal dilation.   Pancreas:  Unremarkable.  No ductal dilation.   Spleen:  Unremarkable.  No splenomegaly.   Adrenals:  Unremarkable.  No mass.   Kidneys and ureters:  Unremarkable.  No obstructing stones.  No hydronephrosis.   Stomach and bowel:  Bowel loops are nondilated.  There is mild diverticulosis of the sigmoid colon without signs of acute diverticulitis.   No acute inflammatory process is seen involving the bowel.  PELVIS:   Appendix:  No findings to suggest acute appendicitis.   Bladder:  Unremarkable.  No stones.   Reproductive:  Unremarkable as visualized.  ABDOMEN and PELVIS:   Intraperitoneal space:  The uterus has been  removed.  There is a trace amount of free fluid in the pelvis.  No free air.   Bones joints:  There is artifact from a right hip arthroplasty.  No acute fracture.  No dislocation.   Soft tissues:  Unremarkable.   Vasculature:  The aorta is moderately calcified but nondilated.   Lymph nodes:  Unremarkable.  No enlarged lymph nodes. IMPRESSION:   Bowel loops are nondilated.  There is mild diverticulosis of the sigmoid colon without signs of acute diverticulitis.  No acute inflammatory process is seen involving the bowel.    Previous hysterectomy.  There is a trace amount of free fluid in the pelvis which is nonspecific.     Impression: Electronically signed by Hugh Gayle MD on 11-26-21 at 0030    CT Chest Without Contrast Diagnostic    Result Date: 11/26/2021  Narrative: Patient: JACKSON CORTÉS  Time Out: 00:17 Exam(s): CT CHEST Without Contrast EXAM:   CT Chest Without Intravenous Contrast CLINICAL HISTORY:    Reason for exam: RUQ and right sided chest pain.. TECHNIQUE:   Axial computed tomography images of the chest without intravenous contrast.  CTDI is 18.4 mGy and DLP is 1293.6 mGy-cm.  This CT exam was performed according to the principle of ALARA (As Low As Reasonably Achievable) by using one or more of the following dose reduction techniques: automated exposure control, adjustment of the mA and or kV according to patient size, and or use of iterative reconstruction technique. COMPARISON:   No relevant prior studies available. FINDINGS:   Lungs:  Right upper lobe infiltrate suspicious for pneumonia.   Pleural space:  There is a trace right pleural effusion layering 8 mm.  No pneumothorax.   Heart:  The heart is not enlarged.  Severe coronary calcification is present.  No significant pericardial effusion.   Bones joints:  Mild degenerative changes in the lower thoracic spine.  No fracture or bone lesion.  No dislocation.   Soft tissues:  Unremarkable.   Vasculature:  The thoracic aorta is mildly calcified  but nondilated.   Lymph nodes:  Unremarkable.  No enlarged lymph nodes. IMPRESSION: 1.  Right upper lobe infiltrate consistent with pneumonia. 2.  There is a trace right pleural effusion layering 8 mm.     Impression: Electronically signed by Hugh Gayle MD on 11-26-21 at 0017    US Gallbladder    Result Date: 11/25/2021  Narrative: Patient: JACKSON CORTÉS  Time Out: 23:39 Exam(s): US GALLBLADDER EXAM:   US Abdomen Limited, Gallbladder CLINICAL HISTORY:    Reason for exam: RUQ pain. TECHNIQUE:   Real-time ultrasound of the right upper quadrant with image documentation. COMPARISON:   No relevant prior studies available. FINDINGS:   Liver:  The liver measures 14.7 cm with normal echotexture.  No focal liver lesion is seen.   Gallbladder:  The gallbladder is normally distended.  No gallstones, wall thickening, or surrounding fluid.  Sonographic Rodriguez sign is negative.   Common bile duct:  The common bile duct is nondilated measuring 3 mm.   Pancreas:  The pancreas is mostly obscured.   Right kidney:  The right kidney measures 9.3 x 4.8 x 4.4 cm with normal appearance.  No hydronephrosis.   Pleural space:  There is a trace right pleural effusion. IMPRESSION:   No acute findings in the right upper quadrant.     Impression: Electronically signed by Huhg Gayle MD on 11-25-21 at 2339    XR Chest 1 View    Result Date: 12/7/2021  Narrative: PROCEDURE: XR CHEST 1 VW  COMPARISON: None  INDICATIONS: SEVERE SOB, CHEST PAIN  FINDINGS:  Lungs normally expanded.  The heart size upper limits of normal.  There is no pneumothorax or pleural effusion.  There is no focal pulmonary parenchymal opacity.  Pulmonary vessels are distinct.  CONCLUSION: No acute cardiopulmonary abnormality.       KENYA CALIX MD       Electronically Signed and Approved By: KENYA CALIX MD on 12/07/2021 at 13:38             XR Chest 1 View    Result Date: 11/26/2021  Narrative: PORTABLE CHEST  HISTORY: Cough, right-sided pain.  FINDINGS: A single view of  the chest demonstrates the heart to be within normal limits in size. Increased density consistent with infiltrate is appreciated involving the right upper lobe superolaterally with mild consolidation. There is no evidence of effusion or of congestive failure.  This report was finalized on 11/26/2021 12:04 PM by Dr. Sky Fish M.D.      CT Chest Pulmonary Embolism    Result Date: 12/7/2021  Narrative: PROCEDURE: CT CHEST PULMONARY EMBOLISM W CONTRAST  COMPARISON:  [facility], CR, XR CHEST 1 VW, 12/07/2021, 13:15. INDICATIONS: evaluate for PE, shortness of breath, pneumonia  TECHNIQUE: After obtaining the patient's consent, CT images were obtained with non-ionic intravenous contrast material.   PROTOCOL:   Pulmonary embolism imaging protocol performed    RADIATION:   DLP: 301.2mGy*cm   Automated exposure control was utilized to minimize radiation dose. CONTRAST: 100cc Isovue 370 I.V.  FINDINGS:  There are multiple filling defects within the bilateral upper and lower lobe pulmonary arteries consistent with multiple pulmonary emboli.  No evidence of saddle embolus.  No CT evidence of right heart strain.  Thoracic aorta is of normal caliber.  No evidence of aortic dissection.  There is no mediastinal, hilar, or axillary adenopathy.  There are no pleural effusions.  There is wedge-shaped peripheral airspace consolidation within the posterior lateral right upper lobe, likely due to pulmonary infarct.  Smaller areas of ground-glass airspace disease are seen within the superior segment of the right lower lobe which may be due to atelectasis or additional pulmonary infarcts.  No focal airspace consolidation seen within the left lung.  Bilateral adrenal glands are within normal limits.  There are no lytic or sclerotic bony lesions identified.  There are degenerative changes throughout the spine.  CONCLUSION:  1. Multiple bilateral pulmonary emboli 2. Airspace disease within the posterior lateral right upper  lobe likely due to pulmonary infarct.  There is also some minimal peripheral ground-glass airspace disease within the superior segment of the right lower lobe which may also be due to pulmonary infarct or atelectasis. 3. Findings were personally called to Dr. Peña in the emergency department at 1451 hours on 12/7/2021   IMAN SAM MD       Electronically Signed and Approved By: IMAN SAM MD on 12/07/2021 at 14:51             Duplex Venous Lower Extremity - Bilateral CAR    Result Date: 12/8/2021  Narrative: · Acute left lower extremity deep vein thrombosis noted in the distal femoral, popliteal and posterial tibial. · All other veins appeared normal bilaterally.      Adult Transthoracic Echo Limited W/ Cont if Necessary Per Protocol    Result Date: 12/8/2021  Narrative: Fibrocalcific mitral and aortic valves. Normal left ventricular systolic function. Mild mitral stenosis. Mild tricuspid regurgitation. Significant pulmonary hypertension.    Stress Test With Myocardial Perfusion One Day    Result Date: 11/24/2021  Narrative: · Left ventricular ejection fraction is hyperdynamic (Calculated EF > 70%). . · Breast attenuation artifact is present. · Myocardial perfusion imaging indicates a normal myocardial perfusion study with no evidence of ischemia. Small area of mild fixed perfusion defect was noted at the apex, more likely related to attenuation artifact versus apical thinning. · There is no prior study available for comparison. · Overall this represents a low risk myocardial perfusion study.          LAB RESULTS:      Lab 12/09/21  0641 12/08/21  0531 12/08/21  0209 12/07/21  2205 12/07/21  1529 12/07/21  1300   WBC 14.52* 17.09*  --  20.33*  --  20.78*   HEMOGLOBIN 11.0* 12.2  --  12.2  --  13.2   HEMATOCRIT 33.9* 37.3  --  36.4  --  38.7   PLATELETS 289 303  --  349  --  324   NEUTROS ABS 8.57* 13.58*  --  17.33*  --  17.01*   IMMATURE GRANS (ABS)  --  0.53*  --  0.44*  --  0.70*   LYMPHS ABS  --  2.02   --  1.66  --  2.16   MONOS ABS  --  0.86  --  0.86  --  0.73   EOS ABS 0.58* 0.00  --  0.00  --  0.07   MCV 90.6 89.4  --  89.0  --  86.8   PROCALCITONIN  --   --   --  0.03  --  0.03   LACTATE  --   --  1.2 2.8*  --  1.9   PROTIME 10.9 10.6  --  11.5 10.4  --    APTT  --  24.7 77.8*  --  19.6*  --          Lab 12/09/21  0641 12/08/21  0531 12/07/21 2205 12/07/21  1300   SODIUM 140 140 143 142   POTASSIUM 3.7 3.7 3.9 4.7   CHLORIDE 109* 110* 109* 110*   CO2 19.9* 17.2* 18.4* 13.6*   ANION GAP 11.1 12.8 15.6* 18.4*   BUN 45* 36* 36* 36*   CREATININE 1.61* 1.45* 1.63* 1.60*   GLUCOSE 80 126* 172* 106*   CALCIUM 8.7 9.0 9.3 9.9   MAGNESIUM 1.6 1.7 1.6 1.7   PHOSPHORUS 3.9 4.3 2.7  --    HEMOGLOBIN A1C  --  5.70*  --   --          Lab 12/09/21  0641 12/08/21  0531 12/07/21  1300   TOTAL PROTEIN 5.8* 6.4 7.5   ALBUMIN 3.10* 3.50 4.20   GLOBULIN 2.7 2.9 3.3   ALT (SGPT) 8 10 12   AST (SGOT) 9 13 20   BILIRUBIN 0.2 0.2 0.4   ALK PHOS 68 79 94   LIPASE  --   --  35         Lab 12/09/21  0641 12/08/21  0531 12/07/21  2321 12/07/21  2205 12/07/21  1529 12/07/21  1300   PROBNP  --  3,791.0*  --   --   --  1,475.0*   TROPONIN T  --  0.043* 0.051*  --   --   --    PROTIME 10.9 10.6  --  11.5 10.4  --    INR 1.04* 1.01*  --  1.12* 0.99*  --          Lab 12/08/21  0531   CHOLESTEROL 225*   LDL CHOL 123*   HDL CHOL 72*   TRIGLYCERIDES 172*             Lab 12/07/21  1744   PH, ARTERIAL 7.472*   PCO2, ARTERIAL 23.7*   PO2 ART 69.0*   O2 SATURATION ART 93.5*   FIO2 36   HCO3 ART 16.9*   BASE EXCESS ART -4.9*   CARBOXYHEMOGLOBIN 0.1     Brief Urine Lab Results  (Last result in the past 365 days)      Color   Clarity   Blood   Leuk Est   Nitrite   Protein   CREAT   Urine HCG        11/25/21 2240 Yellow   Clear   Trace   Moderate (2+)   Negative   Negative               Microbiology Results (last 10 days)     Procedure Component Value - Date/Time    COVID-19,CEPHEID/MARIELY/BDMAX,COR/CLARA/PAD/KIMMY IN-HOUSE(OR EMERGENT/ADD-ON),NP SWAB IN  TRANSPORT MEDIA 3-4 HR TAT, RT-PCR - Swab, Nasopharynx [[id number]]  (Normal) Collected: 12/07/21 1846    Lab Status: Final result Specimen: Swab from Nasopharynx Updated: 12/07/21 2248     COVID19 Not Detected    Narrative:      Fact sheet for providers: https://www.fda.gov/media/877532/download     Fact sheet for patients: https://www.fda.gov/media/280035/download  Presumptive Positive: additional testing may be indicated if it is necessary to differentiate between SARS-CoV-2 and other Sarbecovirus, for epidemiological purposes, or clinical management.    Blood Culture - Blood, Arm, Right [[id number]]  (Normal) Collected: 12/07/21 1314    Lab Status: Preliminary result Specimen: Blood from Arm, Right Updated: 12/08/21 1330     Blood Culture No growth at 24 hours    Blood Culture - Blood, Arm, Left [[id number]]  (Normal) Collected: 12/07/21 1300    Lab Status: Preliminary result Specimen: Blood from Arm, Left Updated: 12/08/21 1315     Blood Culture No growth at 24 hours          CT Abdomen Pelvis Without Contrast    Result Date: 11/26/2021  Impression: Electronically signed by Hugh Gayle MD on 11-26-21 at 0030    CT Chest Without Contrast Diagnostic    Result Date: 11/26/2021  Impression: Electronically signed by Hugh Gayle MD on 11-26-21 at 0017    US Gallbladder    Result Date: 11/25/2021  Impression: Electronically signed by Hugh Gayle MD on 11-25-21 at 2339      Results for orders placed during the hospital encounter of 12/07/21    Duplex Venous Lower Extremity - Bilateral CAR    Interpretation Summary  · Acute left lower extremity deep vein thrombosis noted in the distal femoral, popliteal and posterial tibial.  · All other veins appeared normal bilaterally.      Results for orders placed during the hospital encounter of 12/07/21    Duplex Venous Lower Extremity - Bilateral CAR    Interpretation Summary  · Acute left lower extremity deep vein thrombosis noted in the distal femoral, popliteal and  posterial tibial.  · All other veins appeared normal bilaterally.      Results for orders placed during the hospital encounter of 12/07/21    Adult Transthoracic Echo Limited W/ Cont if Necessary Per Protocol    Interpretation Summary  Fibrocalcific mitral and aortic valves.  Normal left ventricular systolic function.  Mild mitral stenosis.  Mild tricuspid regurgitation.  Significant pulmonary hypertension.      Labs Pending at Discharge:  Pending Labs     Order Current Status    HOLD Troponin-I Tube Collected (12/07/21 1300)    HOLD Troponin-I Tube Collected (12/07/21 1631)    POC Troponin I with Hold Tube Collected (12/07/21 1300)    POC Troponin I with Hold Tube Collected (12/07/21 1631)    Cardiolipin Antibody In process    Factor 2 Activity In process    Protein C & S, Functional In process    Blood Culture - Blood, Arm, Left Preliminary result    Blood Culture - Blood, Arm, Right Preliminary result            Time spent on Discharge including face to face service:  32 minutes    Electronically signed by Joselito Toro DO, 12/09/21, 12:00 PM EST.

## 2021-12-09 NOTE — SIGNIFICANT NOTE
12/09/21 1301   Plan   Final Note Called pts pharmacy to check price of medications. Eliquis is $28.85 and Advair is $6.78. Duo-neb is needing to be resent with IDC-10 code. MD made aware.

## 2021-12-09 NOTE — PROGRESS NOTES
[facility]     Cardiology Progress Note    Patient Name: Patsy Tobin  : 1955  MRN: [id number]  Primary Care Physician:  Annetta Austin APRN  Date of admission: 2021    Subjective   Subjective     No events overnight.  She feels better this morning.  Her shortness of breath improved.  She has no chest pain, palpitations or dizziness.  She is ambulating in the room without issues.    Review of Systems   All systems were reviewed and negative except as above    Objective   Objective     Vitals:   Temp:  [97.4 °F (36.3 °C)-98.1 °F (36.7 °C)] 97.6 °F (36.4 °C)  Heart Rate:  [51-72] 56  Resp:  [15-18] 16  BP: (105-144)/(52-67) 131/62  Flow (L/min):  [2-3] 2  Physical Exam      Constitutional: Awake, alert, No acute distress               Eyes: PERRLA, sclerae anicteric, no conjunctival injection              HENT: NCAT, mucous membranes moist              Neck: Supple, no thyromegaly, no lymphadenopathy, trachea midline              Respiratory: Bibasilar crackles, more on the left side, nonlabored respirations               Cardiovascular: RRR, no murmurs, rubs, or gallops, palpable pedal pulses bilaterally              Gastrointestinal: Positive bowel sounds, soft, nontender, nondistended              Musculoskeletal: No bilateral ankle edema, no clubbing or cyanosis to extremities              Psychiatric: Appropriate affect, cooperative              Neurologic: Oriented x 3, strength symmetric in all extremities, Cranial Nerves grossly intact to confrontation, speech clear              Skin: No rashes     Scheduled Meds:apixaban, 10 mg, Oral, Q12H  [START ON 12/15/2021] apixaban, 5 mg, Oral, Q12H  arformoterol, 15 mcg, Nebulization, BID - RT  atorvastatin, 10 mg, Oral, Nightly  budesonide, 0.5 mg, Nebulization, BID - RT  cefTRIAXone, 1 g, Intravenous, Q24H  fluticasone, 2 spray, Nasal, Daily  lisinopril, 10 mg, Oral, Daily  metoprolol succinate XL, 25 mg, Oral, Daily  pantoprazole, 40  mg, Oral, QAM  senna-docusate sodium, 2 tablet, Oral, BID  sodium chloride, 10 mL, Intravenous, Q12H  Vortioxetine HBr, 5 mg, Oral, Daily      Continuous Infusions:        Result Review    Result Review:  I have personally reviewed the results from the time of this admission to 12/9/2021 09:09 EST and agree with these findings:  [x]  Laboratory  []  Microbiology  [x]  Radiology  [x]  EKG/Telemetry   [x]  Cardiology/Vascular   []  Pathology  []  Old records  []  Other:  Most notable findings include:     CBC    CBC 12/7/21 12/7/21 12/8/21 12/9/21    1300 2205     WBC 20.78 (A) 20.33 (A) 17.09 (A) 14.52 (A)   RBC 4.46 4.09 4.17 3.74 (A)   Hemoglobin 13.2 12.2 12.2 11.0 (A)   Hematocrit 38.7 36.4 37.3 33.9 (A)   MCV 86.8 89.0 89.4 90.6   MCH 29.6 29.8 29.3 29.4   MCHC 34.1 33.5 32.7 32.4   RDW 13.9 14.0 14.2 14.4   Platelets 324 349 303 289   (A) Abnormal value            CMP    CMP 12/7/21 12/7/21 12/8/21 12/9/21    1300 2205     Glucose 106 (A) 172 (A) 126 (A) 80   BUN 36 (A) 36 (A) 36 (A) 45 (A)   Creatinine 1.60 (A) 1.63 (A) 1.45 (A) 1.61 (A)   eGFR Non  Am 32 (A) 31 (A) 36 (A) 32 (A)   Sodium 142 143 140 140   Potassium 4.7 3.9 3.7 3.7   Chloride 110 (A) 109 (A) 110 (A) 109 (A)   Calcium 9.9 9.3 9.0 8.7   Albumin 4.20  3.50 3.10 (A)   Total Bilirubin 0.4  0.2 0.2   Alkaline Phosphatase 94  79 68   AST (SGOT) 20  13 9   ALT (SGPT) 12  10 8   (A) Abnormal value       Comments are available for some flowsheets but are not being displayed.            CARDIAC LABS:      Lab 12/09/21  0641 12/08/21  0531 12/07/21  2321 12/07/21  2205 12/07/21  1529 12/07/21  1300   PROBNP  --  3,791.0*  --   --   --  1,475.0*   TROPONIN T  --  0.043* 0.051*  --   --   --    PROTIME 10.9 10.6  --  11.5 10.4  --    INR 1.04* 1.01*  --  1.12* 0.99*  --         Assessment/Plan   Assessment / Plan     Active Hospital Problems:  -Bilateral pulmonary emboli and left lower extremity DVT, more likely provoked by recent long road  trip.  -Slight elevated troponin, peaked at 0.051 and elevated BNP, more like related to acute pulmonary emboli.  -? Recent pneumonia  -Hypertension, stable  -Dyslipidemia, on statin therapy  -Chronic kidney disease, creatinine stable.     Plan:   -Echocardiogram shows normal LVEF and RVSP of 65 mmHg. No RV strain.   -Continue anticoagulation. She will need 6-12 months of OAC.   -She is wearing an event monitor for assessment of possible A. fib that was suggested by her watch. There has been no evidence of atrial fibrillation on cardiac monitoring so far.   -Continue metoprolol and statin  -BMP within 1 week post discharge.        Will follow from a distance.  She will follow-up with me in clinic within the next 2 to 4 weeks.  She needs follow-up echo in 3 months.        Electronically signed by Francisco Aguilera MD, 12/09/21, 9:09 AM EST.

## 2021-12-09 NOTE — PROGRESS NOTES
Pulmonary / Critical Care Progress Note      Patient Name: Patsy Tobin  : 1955  MRN: [id number]  Primary Care Physician:  Annetta Austin APRN  Date of admission: 2021    Subjective   Subjective   Follow-up for pulmonary embolism, respiratory failure.    Over past 24 hours: Switched to oral Eliquis. Remains on supplemental oxygen.    No acute events overnight.     This morning,   Feels some better.  Still has shortness of breath with minimal exertion.  Oxygen drops down with minimal movements.  Has some chest discomfort and tightness at times.  Still short of breath with minimal activities.  Was able to move around in room.  Has cough, does not produce much phlegm.  No nausea or vomiting.  No fever or chills.      Review of Systems  Constitutional symptoms:   Fatigue, weakness, otherwise denied complaints   Ear, nose, throat: Denied complaints  Cardiovascular:   Chest tightness, chest discomfort, otherwise denied complaints  Respiratory: Shortness of breath with minimal exertion, otherwise denied complaints  Gastrointestinal: Denied complaints  Musculoskeletal: Denied complaints  Genitourinary: Denied complaints  Allergy / Immunology: Denied complaints  Hematologic: Denied complaints  Neurologic: Denied complaints  Endocrine: Denied complaints  Psychiatric: Denied complaints      Objective   Objective     Vitals:   Temp:  [97.4 °F (36.3 °C)-98.1 °F (36.7 °C)] 97.6 °F (36.4 °C)  Heart Rate:  [51-68] 60  Resp:  [15-18] 18  BP: (118-144)/(52-68) 130/68  Flow (L/min):  [2-3] 2  Physical Exam   Vital Signs Reviewed   WDWN, Alert, NAD.    HEENT:  PERRL, EOMI.  OP, nares clear, no sinus tenderness  Neck:  Supple, no JVD, no thyromegaly  Chest:  good aeration, clear to auscultation bilaterally, tympanic to percussion bilaterally, no work of breathing noted  CV: RRR, no MGR, pulses 2+, equal.  Abd:  Soft, NT, ND, + BS, no HSM  EXT:  no clubbing, no cyanosis, no edema  Neuro:  A&Ox3, CN grossly  intact, no focal deficits.  Skin: No rashes or lesions noted      Result Review    Result Review:  I have personally reviewed the results from the time of this admission to 12/9/2021 12:59 EST and agree with these findings:  [x]  Laboratory  [x]  Microbiology  [x]  Radiology  [x]  EKG/Telemetry   []  Cardiology/Vascular   []  Pathology  []  Old records  []  Other:  Most notable findings include: Lower extremity ultrasound with DVT in left leg.    Assessment/Plan   Assessment / Plan     Active Hospital Problems:  Active Hospital Problems    Diagnosis    • Pulmonary embolism (HCC)          Impression:   Acute pulmonary embolism, with extensive clot burden  ?  History of A. Fib  Acute hypoxic respite failure    Plan:  Switched to Eliquis 10 mg twice daily orally.  Will need anticoagulation for at least 6 to 12 months.  Hypercoagulability work-up sent.  Echocardiogram ordered.  Lower extremity ultrasound shows DVT in the left leg.  Possible that blood clot was related to long distance travel early November.  Continue supplemental oxygen to keep saturations more than 90%.  Consult social service for home health and oxygen arrangement.  So far, patient does not have A. fib on monitor.  Cardiology on board.  Will need repeat CT scan of the chest in 3 months.     Reviewed labs, imaging and provider notes.  Discussed with bedside nurse and primary service.       DVT prophylaxis:  Medical and mechanical DVT prophylaxis orders are present.    CODE STATUS:   Code Status (Patient has no pulse and is not breathing): CPR (Attempt to Resuscitate)  Medical Interventions (Patient has pulse or is breathing): Full Support        Electronically signed by Richard Gentile MD, 12/9/2021, 12:59 EST.

## 2021-12-09 NOTE — SIGNIFICANT NOTE
12/09/21 9839   Plan   Plan Comments Patient to discharge home today. Pt will need home oxygen and nebulizer machine. SW discussed with pts daughter in room. Pt is agreeable to [facility] and has not preference with home health. CJ sent referral to [facility] and sent referral to [facility] for home health.   Final Discharge Disposition Code 06 - home with home health care

## 2021-12-09 NOTE — PLAN OF CARE
Goal Outcome Evaluation:   Patient has been stable this shift on 3L NC. Sinusbrady on the monitor, tachy with activity. No complaints or follow up needs at this time.

## 2021-12-09 NOTE — NURSING NOTE
Exercise Oximetry    Patient Name:Patsy Tobin   MRN: [id number]   Date: 12/09/21             ROOM AIR BASELINE   SpO2% 94   Heart Rate    Blood Pressure      EXERCISE ON ROOM AIR SpO2% EXERCISE ON O2 @  2 LPM SpO2%   1 MINUTE 94 1 MINUTE    2 MINUTES 92 2 MINUTES    3 MINUTES  3 MINUTES 96   4 MINUTES  4 MINUTES 93   5 MINUTES  5 MINUTES 92   6 MINUTES  6 MINUTES               Distance Walked   Distance Walked   Dyspnea (Rohini Scale)   Dyspnea (Rohini Scale)   Fatigue (Rohini Scale)   Fatigue (Rohini Scale)   SpO2% Post Exercise   SpO2% Post Exercise   HR Post Exercise   HR Post Exercise   Time to Recovery   Time to Recovery     Comments: At 6 min, O2 sat dropped to 88%. Pt on 4L to recover above 90%.

## 2021-12-10 LAB
PROT C ACT/NOR PPP: >199 % (ref 73–180)
PROT S ACT/NOR PPP: 85 % (ref 63–140)
PROTHROM ACT/NOR PPP: 132 % (ref 50–154)

## 2021-12-10 RX ORDER — HYDROCODONE BITARTRATE AND ACETAMINOPHEN 5; 325 MG/1; MG/1
1 TABLET ORAL EVERY 6 HOURS PRN
Qty: 12 TABLET | Refills: 0 | Status: SHIPPED | OUTPATIENT
Start: 2021-12-10 | End: 2021-12-13

## 2021-12-12 LAB
BACTERIA SPEC AEROBE CULT: NORMAL
BACTERIA SPEC AEROBE CULT: NORMAL

## 2021-12-21 NOTE — PROGRESS NOTES
Primary Care Provider  Annetta Austin APRN     Referring Provider  No ref. provider found     Chief Complaint  Hospital Follow Up Visit, Pulmonary Embolism, and Pain With Breathing (right side )    Subjective          Patsy Tobin presents to [facility] PULMONARY & CRITICAL CARE MEDICINE  History of Present Illness  Patsy Tobin is a 66 y.o. female patient recently admitted to [facility] on Dec 07, 2021 and discharged on Dec 09, 2021.  She is here for a post hospital follow-up today.    Patient presented to the emergency room with increasing shortness of breath.  Patient did have a CT scan of the chest which showed multiple bilateral pulmonary emboli.  Patient was started on a heparin drip and supplemental oxygen.  Patient also had a bilateral lower extremity duplex which showed an acute left lower extremity DVT in the distal femoral, popliteal, and posterior tibial veins.  Patient also had an elevated troponin during her hospital stay, and cardiology was consulted.  An echocardiogram was performed which showed elevated RV pressures with a normal ejection fracture, there is no evidence of right heart strain.  Patient was transitioned to Eliquis and will continue for 6 to 12 months.  It is suggested that patient have a repeat chest CT scan in 3 months.  Patient did have a hypercoagulability work-up in the hospital which showed an abnormal anticardiolipin IgM at 15.    Patient states that she is doing well since her hospital stay.  She is slowly improving each day.  She still complains of some shortness of breath and fatigue.  Her shortness of breath is mild to moderate in severity, worse exertion and improved with rest.  She is taking her Eliquis as prescribed 5 mg twice a day.  She continues to use Advair as prescribed.  She is not using her nebulizer machine.  She continues wear 2 L of oxygen.  She has gone short periods of time without it and has kept a log of her  oxygen saturations.  The lowest was 91%.  Instructed patient that she can go longer periods of time without her oxygen, or decrease from 2 L to 1 L and see how she does with that.  Patient and daughter verbalized understanding.  Patient said that she also did have a pain in her right side the other night, that lasted approximately 20 minutes.  She states that it felt the way it did when she was first diagnosed with her pulmonary embolism.  Patient was discharged home from the hospital with pain medication, however she has not taken any yet.  Patient denies any personal or family history of lung conditions.  She did have a previous superficial clot in her leg in the past.  She is also had a hypercoagulable work-up done and saw hematology/oncology approximately 4 years ago, and these labs were normal.  After speaking with patient, we will wait until she sees her primary care provider next week, to see if she needs to return to hematology.  Patient and daughter are agreeable to this plan.  Patient is a former smoker and quit in 1988.  She smoked approximately 1 pack of cigarettes a day for 22 years.  She denies any occupational exposure to chemicals.  She also denies any exposure to birds.  She is able to perform her ADLs without difficulty, however states that her large tanks are quite heavy.  We will then order for patient to receive the smaller oxygen tanks, and that way patient will be able to better perform her ADLs such as grocery shopping.  She is up-to-date with her flu, pneumonia, and Covid vaccine.  She has no other concerns at this time.         Her history of smoking is      Tobacco Use: Medium Risk   • Smoking Tobacco Use: Former Smoker   • Smokeless Tobacco Use: Never Used   .    Review of Systems   Constitutional: Positive for fatigue. Negative for chills, fever, unexpected weight gain and unexpected weight loss.   HENT: Negative for congestion (Nasal), hearing loss, mouth sores, nosebleeds, postnasal  drip, sore throat and trouble swallowing.    Eyes: Negative for blurred vision and visual disturbance.   Respiratory: Positive for shortness of breath. Negative for apnea, cough and wheezing.         Negative for Hemoptysis     Cardiovascular: Negative for chest pain, palpitations and leg swelling.   Gastrointestinal: Negative for abdominal pain, constipation, diarrhea, nausea, vomiting and GERD.        Negative for Jaundice  Negative for Bloating  Negative for Melena   Musculoskeletal: Negative for joint swelling and myalgias.        Negative for Joint pain  Negative for Joint stiffness   Skin: Negative for color change.        Negative for cyanosis   Neurological: Negative for syncope, weakness, numbness and headache.      Sleep: Negative for Excessive daytime sleepiness  Negative for morning headaches  Negative for Snoring    Family History   Problem Relation Age of Onset   • Cancer Father         kidney   • Hypertension Mother    • Diabetes Maternal Grandmother    • Colon polyps Brother    • Colon cancer Neg Hx         Social History     Socioeconomic History   • Marital status:      Spouse name: Jose   • Years of education: High school   Tobacco Use   • Smoking status: Former Smoker     Packs/day: 1.00     Years: 22.00     Pack years: 22.00     Types: Cigarettes     Quit date:      Years since quittin.0   • Smokeless tobacco: Never Used   Vaping Use   • Vaping Use: Never used   Substance and Sexual Activity   • Alcohol use: Yes     Alcohol/week: 0.0 standard drinks     Comment: Social drinker, do not drink everyday   • Drug use: No   • Sexual activity: Yes     Partners: Male     Birth control/protection: Surgical     Comment: Hysterectomy in         Past Medical History:   Diagnosis Date   • Abdominal pain of multiple sites    • Allergic rhinitis    • AMEYA positive    • Blurred vision, bilateral    • Deep vein thrombosis (HCC)    • Depression    • Diarrhea    • Esophagitis 2016   • Fatigue     • Gastric ulcer    • GERD (gastroesophageal reflux disease)    • Headache    • High blood pressure    • Hypertension    • Leg cramps    • Leg wound, right    • Mitral valve prolapse    • Multiple joint pain    • Overweight    • Peptic ulceration    • PONV (postoperative nausea and vomiting)    • Pulmonary embolism (HCC) 11/25/2021   • RA (rheumatoid arthritis) (HCC)    • Superficial phlebitis     in left ankle    • Tattoos     permanent makeup         Immunization History   Administered Date(s) Administered   • COVID-19 (PFIZER) 03/02/2021, 03/23/2021         Allergies   Allergen Reactions   • Celebrex [Celecoxib] Other (See Comments)     Passed Out    • Arava [Leflunomide] Other (See Comments)     Altered Glucose Levels   • Duloxetine Hallucinations   • Naproxen GI Intolerance     All NSAIDS    • Plaquenil [Hydroxychloroquine Sulfate] Mental Status Change          Current Outpatient Medications:   •  alosetron (LOTRONEX) 0.5 MG tablet, Take 1 tablet by mouth 2 (Two) Times a Day As Needed (diarrhea)., Disp: 60 tablet, Rfl: 5  •  apixaban (ELIQUIS) 5 MG tablet tablet, Take 1 tablet twice a day., Disp: 60 tablet, Rfl: 11  •  etanercept (ENBREL) 50 MG/ML solution prefilled syringe injection, Inject 50 mg under the skin 1 (One) Time Per Week., Disp: , Rfl:   •  fluticasone (FLONASE) 50 MCG/ACT nasal spray, 2 sprays into the nostril(s) as directed by provider Daily., Disp: , Rfl:   •  fluticasone-salmeterol (Advair Diskus) 250-50 MCG/DOSE DISKUS, Inhale 1 puff 2 (Two) Times a Day for 30 days., Disp: 60 each, Rfl: 2  •  hyoscyamine (LEVSIN) 0.125 MG SL tablet, Take 1 tablet by mouth Every 6 (Six) Hours As Needed (esophageal spasm)., Disp: 60 tablet, Rfl: 2  •  lisinopril (PRINIVIL,ZESTRIL) 10 MG tablet, Take 10 mg by mouth Daily., Disp: , Rfl:   •  lovastatin (MEVACOR) 20 MG tablet, Take 20 mg by mouth Every Night., Disp: , Rfl:   •  metoprolol succinate XL (TOPROL-XL) 25 MG 24 hr tablet, Take 1 tablet by mouth Daily.,  Disp: 90 tablet, Rfl: 3  •  O2 (OXYGEN), Inhale 2 L/min 1 (One) Time., Disp: , Rfl:   •  omeprazole (priLOSEC) 40 MG capsule, Take 1 capsule by mouth once daily, Disp: 90 capsule, Rfl: 0  •  Trintellix 5 MG tablet, Take 5 mg by mouth Daily., Disp: , Rfl:   •  aspirin 81 MG chewable tablet, Chew 81 mg Daily., Disp: , Rfl:   •  escitalopram (Lexapro) 10 MG tablet, Take 1 tablet by mouth Daily., Disp: , Rfl:   •  ipratropium-albuterol (DUO-NEB) 0.5-2.5 mg/3 ml nebulizer, Take 3 mL by nebulization Every 4 (Four) Hours As Needed for Wheezing for up to 30 days., Disp: 360 mL, Rfl: 0  •  meloxicam (MOBIC) 15 MG tablet, Take 15 mg by mouth Daily., Disp: , Rfl:      Objective   Physical Exam  Constitutional:       General: She is not in acute distress.     Appearance: Normal appearance. She is normal weight.   HENT:      Right Ear: Hearing normal.      Left Ear: Hearing normal.      Nose: No nasal tenderness or congestion.      Mouth/Throat:      Mouth: Mucous membranes are moist. No oral lesions.   Eyes:      Extraocular Movements: Extraocular movements intact.      Pupils: Pupils are equal, round, and reactive to light.   Neck:      Thyroid: No thyroid mass or thyromegaly.   Cardiovascular:      Rate and Rhythm: Normal rate and regular rhythm.      Pulses: Normal pulses.      Heart sounds: Normal heart sounds. No murmur heard.      Pulmonary:      Effort: Pulmonary effort is normal.      Breath sounds: Normal breath sounds. No wheezing, rhonchi or rales.      Comments: Patient on 2 L of oxygen.  She is able to speak full sentences without difficulty.  Chest:   Breasts:      Right: No axillary adenopathy.       Abdominal:      General: Bowel sounds are normal. There is no distension.      Palpations: Abdomen is soft.      Tenderness: There is no abdominal tenderness.   Musculoskeletal:      Cervical back: Neck supple.      Right lower leg: No edema.      Left lower leg: No edema.   Lymphadenopathy:      Cervical: No  "cervical adenopathy.      Upper Body:      Right upper body: No axillary adenopathy.   Skin:     General: Skin is warm and dry.      Findings: No lesion or rash.   Neurological:      General: No focal deficit present.      Mental Status: She is alert and oriented to person, place, and time.      Cranial Nerves: Cranial nerves are intact.   Psychiatric:         Mood and Affect: Affect normal. Mood is not anxious or depressed.         Vital Signs:   /57 (BP Location: Left arm, Patient Position: Sitting, Cuff Size: Adult)   Pulse 56   Temp 98.2 °F (36.8 °C)   Resp 16   Ht 170.2 cm (67\")   Wt 88 kg (194 lb)   SpO2 100% Comment: 2L of O2  BMI 30.38 kg/m²        Result Review :     CMP    CMP 12/7/21 12/7/21 12/8/21 12/9/21    1300 2205     Glucose 106 (A) 172 (A) 126 (A) 80   BUN 36 (A) 36 (A) 36 (A) 45 (A)   Creatinine 1.60 (A) 1.63 (A) 1.45 (A) 1.61 (A)   eGFR Non  Am 32 (A) 31 (A) 36 (A) 32 (A)   Sodium 142 143 140 140   Potassium 4.7 3.9 3.7 3.7   Chloride 110 (A) 109 (A) 110 (A) 109 (A)   Calcium 9.9 9.3 9.0 8.7   Albumin 4.20  3.50 3.10 (A)   Total Bilirubin 0.4  0.2 0.2   Alkaline Phosphatase 94  79 68   AST (SGOT) 20  13 9   ALT (SGPT) 12  10 8   (A) Abnormal value       Comments are available for some flowsheets but are not being displayed.           CBC w/diff    CBC w/Diff 12/7/21 12/7/21 12/8/21 12/9/21    1300 2205     WBC 20.78 (A) 20.33 (A) 17.09 (A) 14.52 (A)   RBC 4.46 4.09 4.17 3.74 (A)   Hemoglobin 13.2 12.2 12.2 11.0 (A)   Hematocrit 38.7 36.4 37.3 33.9 (A)   MCV 86.8 89.0 89.4 90.6   MCH 29.6 29.8 29.3 29.4   MCHC 34.1 33.5 32.7 32.4   RDW 13.9 14.0 14.2 14.4   Platelets 324 349 303 289   Neutrophil Rel % 81.9 (A) 85.2 (A) 79.5 (A)    Immature Granulocyte Rel % 3.4 (A) 2.2 (A) 3.1 (A)    Lymphocyte Rel % 10.4 (A) 8.2 (A) 11.8 (A)    Monocyte Rel % 3.5 (A) 4.2 (A) 5.0    Eosinophil Rel % 0.3 0.0 (A) 0.0 (A)    Basophil Rel % 0.5 0.2 0.6    (A) Abnormal value            Covid Tests  "   Common Labsle 12/7/21   COVID19 Not Detected             Data reviewed: Radiologic studies Chest x-ray Dec 07, 2021, chest CT Dec 07, 2021, bilateral venous duplex lower extremity Dec 07, 2021, Cardiology studies Echocardiogram Dec 08, 2021, Consultant notes Dr. Gentile consult note Dec 07, 2021 and Recent hospitalization notes Hospital discharge summary Dec 09, 2021   Procedures        Assessment and Plan    Diagnoses and all orders for this visit:    1. Other acute pulmonary embolism, unspecified whether acute cor pulmonale present (HCC) (Primary)  -     apixaban (ELIQUIS) 5 MG tablet tablet; Take 1 tablet twice a day.  Dispense: 60 tablet; Refill: 11  -     Oxygen Therapy    2. Thrombophlebitis leg    3. Acute deep vein thrombosis (DVT) of other specified vein of left lower extremity (HCC)  -     apixaban (ELIQUIS) 5 MG tablet tablet; Take 1 tablet twice a day.  Dispense: 60 tablet; Refill: 11  -     CT Chest With Contrast; Future    4. Nicotine dependence, cigarettes, in remission    5. Shortness of breath  -     fluticasone-salmeterol (Advair Diskus) 250-50 MCG/DOSE DISKUS; Inhale 1 puff 2 (Two) Times a Day for 30 days.  Dispense: 60 each; Refill: 2    6. Acute respiratory failure with hypoxia (HCC)    7. Atrial fibrillation, unspecified type (HCC)    8. Allergic rhinitis, unspecified seasonality, unspecified trigger    9.  Continue Advair as prescribed.  Rinse mouth after each use.  10.  Continue Eliquis for a total of 6 to 12 months.  11.  Referral to hematology offered to patient, she will wait until she sees her primary care next week before proceeding.  12.  Continue supplemental oxygen to maintain saturations at or above 89%.  13.  Chest CT with contrast ordered to be performed in March 2022.  14.  Follow-up with cardiology as scheduled next month.  15.  Follow-up with primary care next week as scheduled.  16.  Follow-up with Dr. Gentile in 3 to 4 months, sooner if needed.    I spent 50 minutes caring for  Patsy on this date of service. This time includes time spent by me in the following activities:preparing for the visit, reviewing tests, obtaining and/or reviewing a separately obtained history, performing a medically appropriate examination and/or evaluation , counseling and educating the patient/family/caregiver, ordering medications, tests, or procedures and documenting information in the medical record    Follow Up   Return in about 3 months (around 3/23/2022) for Recheck with Dr. Gentile after CT.  Patient was given instructions and counseling regarding her condition or for health maintenance advice. Please see specific information pulled into the AVS if appropriate.

## 2021-12-23 ENCOUNTER — OFFICE VISIT (OUTPATIENT)
Dept: PULMONOLOGY | Facility: CLINIC | Age: 66
End: 2021-12-23

## 2021-12-23 VITALS
WEIGHT: 194 LBS | BODY MASS INDEX: 30.45 KG/M2 | HEIGHT: 67 IN | RESPIRATION RATE: 16 BRPM | OXYGEN SATURATION: 100 % | SYSTOLIC BLOOD PRESSURE: 128 MMHG | HEART RATE: 56 BPM | DIASTOLIC BLOOD PRESSURE: 57 MMHG | TEMPERATURE: 98.2 F

## 2021-12-23 DIAGNOSIS — I80.3 THROMBOPHLEBITIS LEG: ICD-10-CM

## 2021-12-23 DIAGNOSIS — J96.01 ACUTE RESPIRATORY FAILURE WITH HYPOXIA (HCC): ICD-10-CM

## 2021-12-23 DIAGNOSIS — I48.91 ATRIAL FIBRILLATION, UNSPECIFIED TYPE (HCC): ICD-10-CM

## 2021-12-23 DIAGNOSIS — F17.211 NICOTINE DEPENDENCE, CIGARETTES, IN REMISSION: ICD-10-CM

## 2021-12-23 DIAGNOSIS — I26.99 OTHER ACUTE PULMONARY EMBOLISM, UNSPECIFIED WHETHER ACUTE COR PULMONALE PRESENT (HCC): Primary | ICD-10-CM

## 2021-12-23 DIAGNOSIS — R06.02 SHORTNESS OF BREATH: ICD-10-CM

## 2021-12-23 DIAGNOSIS — J30.9 ALLERGIC RHINITIS, UNSPECIFIED SEASONALITY, UNSPECIFIED TRIGGER: ICD-10-CM

## 2021-12-23 DIAGNOSIS — I82.492 ACUTE DEEP VEIN THROMBOSIS (DVT) OF OTHER SPECIFIED VEIN OF LEFT LOWER EXTREMITY (HCC): ICD-10-CM

## 2021-12-23 PROCEDURE — 99215 OFFICE O/P EST HI 40 MIN: CPT | Performed by: NURSE PRACTITIONER

## 2021-12-23 RX ORDER — OMEPRAZOLE 40 MG/1
CAPSULE, DELAYED RELEASE ORAL
Qty: 90 CAPSULE | Refills: 0 | Status: SHIPPED | OUTPATIENT
Start: 2021-12-23 | End: 2022-03-22

## 2021-12-23 RX ORDER — ESCITALOPRAM OXALATE 10 MG/1
1 TABLET ORAL DAILY
COMMUNITY
End: 2022-01-10

## 2021-12-23 NOTE — PATIENT INSTRUCTIONS
"[email or web address]   Pulmonary Embolism    A pulmonary embolism (PE) is a sudden blockage or decrease of blood flow in one or both lungs that happens when a clot travels into the arteries of the lung (pulmonary arteries). Most blockages come from a blood clot that forms in the vein of a leg or arm (deep vein thrombosis, DVT) and travels to the lungs. A clot is blood that has thickened into a gel or solid. PE is a dangerous and life-threatening condition that needs to be treated right away.  What are the causes?  This condition is usually caused by a blood clot that forms in a vein and moves to the lungs. In rare cases, it may be caused by air, fat, part of a tumor, or other tissue that moves through the veins and into the lungs.  What increases the risk?  The following factors may make you more likely to develop this condition:  · Experiencing a traumatic injury, such as breaking a hip or leg.  · Having:  ? A spinal cord injury.  ? Major surgery, especially hip or knee replacement, or surgery on parts of the nervous system or on the abdomen.  ? A stroke.  ? A blood clotting disease.  ? Long-term (chronic) lung or heart disease.  ? Cancer, especially if you are being treated with chemotherapy.  ? A central venous catheter.  · Taking medicines that contain estrogen. These include birth control pills and hormone replacement therapy.  · Being:  ? Pregnant.  ? In the period of time after your baby is delivered (postpartum).  ? Older than age 60.  ? Overweight.  ? A smoker, especially if you have other risks.  ? Not very active (sedentary), not being able to move at all, or spending long periods sitting, such as travel over 6 hours. You are also at a greater risk if you have a leg in a cast or splint.  What are the signs or symptoms?  Symptoms of this condition usually start suddenly and include:  · Shortness of breath during activity " or at rest.  · Coughing, coughing up blood, or coughing up bloody mucus.  · Chest pain, back pain, or shoulder blade pain that gets worse with deep breaths.  · Rapid or irregular heartbeat.  · Feeling light-headed or dizzy, or fainting.  · Feeling anxious.  · Pain and swelling in a leg. This is a symptom of DVT, which can lead to PE.  How is this diagnosed?  This condition may be diagnosed based on your medical history, a physical exam, and tests. Tests may include:  · Blood tests.  · An ECG (electrocardiogram) of the heart.  · A CT pulmonary angiogram. This test checks blood flow in and around your lungs.  · A ventilation-perfusion scan, also called a lung VQ scan. This test measures air flow and blood flow to the lungs.  · An ultrasound to check for a DVT.  How is this treated?  Treatment for this condition depends on many factors, such as the cause of your PE, your risk for bleeding or developing more clots, and other medical conditions you may have. Treatment aims to stop blood clots from forming or growing larger. In some cases, treatment may be aimed at breaking apart or removing the blood clot. Treatment may include:  · Medicines, such as:  ? Blood thinning medicines, also called anticoagulants, to stop clots from forming and growing.  ? Medicines that break apart clots (thrombolytics).  · Procedures, such as:  ? Using a flexible tube to remove a blood clot (embolectomy) or to deliver medicine to destroy it (catheter-directed thrombolysis).  ? Surgery to remove the clot (surgical embolectomy). This is rare.  You may need a combination of immediate, long-term, and extended treatments. Your treatment may continue for several months (maintenance therapy) or longer depending on your medical conditions. You and your health care provider will work together to choose the treatment program that is best for you.  Follow these instructions at home:  Medicines  · Take over-the-counter and prescription medicines only as  told by your health care provider.  · If you are taking blood thinners:  ? Talk with your health care provider before you take any medicines that contain aspirin or NSAIDs, such as ibuprofen. These medicines increase your risk for dangerous bleeding.  ? Take your medicine exactly as told, at the same time every day.  ? Avoid activities that could cause injury or bruising, and follow instructions about how to prevent falls.  ? Wear a medical alert bracelet or carry a card that lists what medicines you take.  · Understand what foods and drugs interact with any medicines that you are taking.  General instructions  · Ask your health care provider when you may return to your normal activities. Avoid sitting or lying for a long time without moving.  · Maintain a healthy weight. Ask your health care provider what weight is healthy for you.  · Do not use any products that contain nicotine or tobacco, such as cigarettes, e-cigarettes, and chewing tobacco. If you need help quitting, ask your health care provider.  · Talk with your health care provider about any travel plans. It is important to make sure that you are still able to take your medicine while traveling.  · Keep all follow-up visits as told by your health care provider. This is important.  Where to find more information  · American Lung Association: www.lung.org  · Centers for Disease Control and Prevention: www.cdc.gov  Contact a health care provider if:  · You missed a dose of your blood thinner medicine.  Get help right away if you:  · Have:  ? New or increased pain, swelling, warmth, or redness in an arm or leg.  ? Shortness of breath that gets worse during activity or at rest.  ? A fever.  ? Worsening chest pain.  ? A rapid or irregular heartbeat.  ? A severe headache.  ? Vision changes.  ? A serious fall or accident, or you hit your head.  ? Stomach pain.  ? Blood in your vomit, stool, or urine.  ? A cut that will not stop bleeding.  · Cough up blood.  · Feel  light-headed or dizzy, and that feeling does not go away.  · Cannot move your arms or legs.  · Are confused or have memory loss.  These symptoms may represent a serious problem that is an emergency. Do not wait to see if the symptoms will go away. Get medical help right away. Call your local emergency services (911 in the [place] Do not drive yourself to the hospital.  Summary  · A pulmonary embolism (PE) is a serious and potentially life-threatening condition, in which a blood clot from one part of the body (deep vein thrombosis, DVT) travels to the arteries of the lung, causing a sudden blockage or decrease of blood flow to the lungs. This may result in shortness of breath, chest pain, dizziness, and fainting.  · Treatments for this condition usually include medicines to thin your blood (anticoagulants) or medicines to break apart blood clots (thrombolytics).  · If you are given blood thinners, take your medicine exactly as told by your health care provider, at the same time every day. This is important.  · Understand what foods and drugs interact with any medicines that you are taking.  · If you have signs of PE or DVT, call your local emergency services (911 in the [place]  This information is not intended to replace advice given to you by your health care provider. Make sure you discuss any questions you have with your health care provider.  Document Revised: 10/30/2020 Document Reviewed: 10/30/2020  [employer]vier Patient Education © 2021 [employer]

## 2022-01-10 ENCOUNTER — OFFICE VISIT (OUTPATIENT)
Dept: CARDIOLOGY | Facility: CLINIC | Age: 67
End: 2022-01-10

## 2022-01-10 VITALS
SYSTOLIC BLOOD PRESSURE: 152 MMHG | BODY MASS INDEX: 30.76 KG/M2 | HEART RATE: 58 BPM | HEIGHT: 67 IN | WEIGHT: 196 LBS | DIASTOLIC BLOOD PRESSURE: 74 MMHG

## 2022-01-10 DIAGNOSIS — I26.99 ACUTE PULMONARY EMBOLISM WITHOUT ACUTE COR PULMONALE, UNSPECIFIED PULMONARY EMBOLISM TYPE: ICD-10-CM

## 2022-01-10 DIAGNOSIS — E78.2 MIXED HYPERLIPIDEMIA: ICD-10-CM

## 2022-01-10 DIAGNOSIS — R06.02 SHORTNESS OF BREATH: Primary | ICD-10-CM

## 2022-01-10 DIAGNOSIS — I10 ESSENTIAL HYPERTENSION: ICD-10-CM

## 2022-01-10 PROCEDURE — 99214 OFFICE O/P EST MOD 30 MIN: CPT | Performed by: INTERNAL MEDICINE

## 2022-01-10 NOTE — PROGRESS NOTES
Chief Complaint  Hypertension and Mitral Valve Prolapse    Subjective      Patient returns to clinic to follow-up on recent hospitalization.  She was admitted to the hospital with shortness of breath and was diagnosed with multiple bilateral pulmonary emboli and acute left lower extremity DVT.  Her DVT/PE were attributed to long road trip.  She was treated with anticoagulation and was discharged on Eliquis which she is tolerating without bleeding or other side effects.  She continues to be on oxygen at night.  Overall she feels that she is getting better.  She has no chest discomfort, edema, presyncope or syncope.  She is supposed to have a repeat CT angiogram of the chest and follow-up with pulmonary and hematology within the next few months.  Echocardiogram in the hospital demonstrated normal LV systolic function, and severely elevated RV systolic pressure.    Past Medical History:   Diagnosis Date   • Abdominal pain of multiple sites    • Allergic rhinitis    • AMEYA positive    • Blurred vision, bilateral    • Deep vein thrombosis (HCC)    • Depression    • Diarrhea    • Esophagitis 2016   • Fatigue    • Gastric ulcer    • GERD (gastroesophageal reflux disease)    • Headache    • High blood pressure    • Hypertension    • Leg cramps    • Leg wound, right    • Mitral valve prolapse    • Multiple joint pain    • Overweight    • Peptic ulceration    • PONV (postoperative nausea and vomiting)    • Pulmonary embolism (HCC) 11/25/2021   • RA (rheumatoid arthritis) (HCC)    • Superficial phlebitis     in left ankle    • Tattoos     permanent makeup          Current Outpatient Medications:   •  alosetron (LOTRONEX) 0.5 MG tablet, Take 1 tablet by mouth 2 (Two) Times a Day As Needed (diarrhea)., Disp: 60 tablet, Rfl: 5  •  apixaban (ELIQUIS) 5 MG tablet tablet, Take 1 tablet twice a day., Disp: 60 tablet, Rfl: 11  •  aspirin 81 MG chewable tablet, Chew 81 mg Daily., Disp: , Rfl:   •  etanercept (ENBREL) 50 MG/ML solution  prefilled syringe injection, Inject 50 mg under the skin 1 (One) Time Per Week., Disp: , Rfl:   •  fluticasone (FLONASE) 50 MCG/ACT nasal spray, 2 sprays into the nostril(s) as directed by provider Daily., Disp: , Rfl:   •  fluticasone-salmeterol (Advair Diskus) 250-50 MCG/DOSE DISKUS, Inhale 1 puff 2 (Two) Times a Day for 30 days., Disp: 60 each, Rfl: 2  •  hyoscyamine (LEVSIN) 0.125 MG SL tablet, Take 1 tablet by mouth Every 6 (Six) Hours As Needed (esophageal spasm)., Disp: 60 tablet, Rfl: 2  •  lisinopril (PRINIVIL,ZESTRIL) 10 MG tablet, Take 10 mg by mouth Daily., Disp: , Rfl:   •  lovastatin (MEVACOR) 20 MG tablet, Take 20 mg by mouth Every Night., Disp: , Rfl:   •  meloxicam (MOBIC) 15 MG tablet, Take 15 mg by mouth Daily., Disp: , Rfl:   •  metoprolol succinate XL (TOPROL-XL) 25 MG 24 hr tablet, Take 1 tablet by mouth Daily., Disp: 90 tablet, Rfl: 3  •  O2 (OXYGEN), Inhale 2 L/min 1 (One) Time., Disp: , Rfl:   •  omeprazole (priLOSEC) 40 MG capsule, Take 1 capsule by mouth once daily, Disp: 90 capsule, Rfl: 0  •  escitalopram (Lexapro) 10 MG tablet, Take 1 tablet by mouth Daily., Disp: , Rfl:   •  ipratropium-albuterol (DUO-NEB) 0.5-2.5 mg/3 ml nebulizer, Take 3 mL by nebulization Every 4 (Four) Hours As Needed for Wheezing for up to 30 days., Disp: 360 mL, Rfl: 0  •  Trintellix 5 MG tablet, Take 5 mg by mouth Daily., Disp: , Rfl:     There are no discontinued medications.  Allergies   Allergen Reactions   • Celebrex [Celecoxib] Other (See Comments)     Passed Out    • Arava [Leflunomide] Other (See Comments)     Altered Glucose Levels   • Duloxetine Hallucinations   • Naproxen GI Intolerance     All NSAIDS    • Plaquenil [Hydroxychloroquine Sulfate] Mental Status Change        Social History     Tobacco Use   • Smoking status: Former Smoker     Packs/day: 1.00     Years: 22.00     Pack years: 22.00     Types: Cigarettes     Quit date:      Years since quittin.0   • Smokeless tobacco: Never Used  "  Vaping Use   • Vaping Use: Never used   Substance Use Topics   • Alcohol use: Yes     Alcohol/week: 0.0 standard drinks     Comment: Social drinker, do not drink everyday   • Drug use: No       Family History   Problem Relation Age of Onset   • Cancer Father         kidney   • Hypertension Mother    • Diabetes Maternal Grandmother    • Colon polyps Brother    • Colon cancer Neg Hx         Objective     /74   Pulse 58   Ht 170.2 cm (67\")   Wt 88.9 kg (196 lb)   BMI 30.70 kg/m²       Physical Exam    General Appearance:   · no acute distress  · Alert and oriented x3  HENT:   · lips not cyanotic  · Atraumatic  Neck:  · No jvd   · supple  Respiratory:  · no respiratory distress  · normal breath sounds  · no rales  Cardiovascular:  · no S3, no S4   · Grade 2/6 systolic murmur at the left lower sternal border  · no rub  Extremities  · No cyanosis  · lower extremity edema: none    Skin:   · warm, dry  · No rashes      Result Review :     proBNP   Date Value Ref Range Status   12/08/2021 3,791.0 (H) 0.0 - 900.0 pg/mL Final     CMP    CMP 12/7/21 12/7/21 12/8/21 12/9/21    1300 2205     Glucose 106 (A) 172 (A) 126 (A) 80   BUN 36 (A) 36 (A) 36 (A) 45 (A)   Creatinine 1.60 (A) 1.63 (A) 1.45 (A) 1.61 (A)   eGFR Non  Am 32 (A) 31 (A) 36 (A) 32 (A)   Sodium 142 143 140 140   Potassium 4.7 3.9 3.7 3.7   Chloride 110 (A) 109 (A) 110 (A) 109 (A)   Calcium 9.9 9.3 9.0 8.7   Albumin 4.20  3.50 3.10 (A)   Total Bilirubin 0.4  0.2 0.2   Alkaline Phosphatase 94  79 68   AST (SGOT) 20  13 9   ALT (SGPT) 12  10 8   (A) Abnormal value       Comments are available for some flowsheets but are not being displayed.           CBC w/diff    CBC w/Diff 12/7/21 12/7/21 12/8/21 12/9/21    1300 2205     WBC 20.78 (A) 20.33 (A) 17.09 (A) 14.52 (A)   RBC 4.46 4.09 4.17 3.74 (A)   Hemoglobin 13.2 12.2 12.2 11.0 (A)   Hematocrit 38.7 36.4 37.3 33.9 (A)   MCV 86.8 89.0 89.4 90.6   MCH 29.6 29.8 29.3 29.4   MCHC 34.1 33.5 32.7 32.4 "   RDW 13.9 14.0 14.2 14.4   Platelets 324 349 303 289   Neutrophil Rel % 81.9 (A) 85.2 (A) 79.5 (A)    Immature Granulocyte Rel % 3.4 (A) 2.2 (A) 3.1 (A)    Lymphocyte Rel % 10.4 (A) 8.2 (A) 11.8 (A)    Monocyte Rel % 3.5 (A) 4.2 (A) 5.0    Eosinophil Rel % 0.3 0.0 (A) 0.0 (A)    Basophil Rel % 0.5 0.2 0.6    (A) Abnormal value             No results found for: TSH   No results found for: FREET4   No results found for: DDIMERQUANT  Magnesium   Date Value Ref Range Status   12/09/2021 1.6 1.6 - 2.4 mg/dL Final      No results found for: DIGOXIN   Lab Results   Component Value Date    TROPONINT 0.043 (C) 12/08/2021           Lipid Panel    Lipid Panel 12/8/21   Total Cholesterol 225 (A)   Triglycerides 172 (A)   HDL Cholesterol 72 (A)   VLDL Cholesterol 30   LDL Cholesterol  123 (A)   LDL/HDL Ratio 1.65   (A) Abnormal value            Lab Results   Component Value Date    POCTROP 0.05 12/07/2021       Results for orders placed during the hospital encounter of 12/07/21    Adult Transthoracic Echo Limited W/ Cont if Necessary Per Protocol    Interpretation Summary  Fibrocalcific mitral and aortic valves.  Normal left ventricular systolic function.  Mild mitral stenosis.  Mild tricuspid regurgitation.  Significant pulmonary hypertension.                 Diagnoses and all orders for this visit:    1. Shortness of breath (Primary)    2. Essential hypertension    3. Mixed hyperlipidemia    4. Acute pulmonary embolism without acute cor pulmonale, unspecified pulmonary embolism type (HCC)  -     Adult Transthoracic Echo Complete W/ Cont if Necessary Per Protocol; Future        Assessment:    -Recent hospitalization for acute left lower extremity DVT and bilateral PE, currently on anticoagulation with Eliquis.  Continue the same.  She seems to be improving.  Echo from the hospital demonstrated severely elevated RV systolic pressure.  We will repeat echocardiogram in 6 months for follow-up on pulmonary pressure.  She has  appointments with hematology and pulmonary and was advised to keep them.    -Hypertension: Her blood pressure is mildly elevated today which is unusual for her.  She checks her blood pressure at home on a regular basis and her numbers have been in the 120s systolic.  Continue current regimen and regular blood pressure monitoring.    -Dyslipidemia: Continue statin therapy      Follow Up     Return in about 6 months (around 7/10/2022).        Patient was given instructions and counseling regarding her condition or for health maintenance advice. Please see specific information pulled into the AVS if appropriate.

## 2022-02-08 ENCOUNTER — HOSPITAL ENCOUNTER (OUTPATIENT)
Dept: CT IMAGING | Facility: HOSPITAL | Age: 67
Discharge: HOME | End: 2022-02-08
Admitting: NURSE PRACTITIONER

## 2022-02-08 DIAGNOSIS — I82.492 ACUTE DEEP VEIN THROMBOSIS (DVT) OF OTHER SPECIFIED VEIN OF LEFT LOWER EXTREMITY: ICD-10-CM

## 2022-02-08 LAB — CREAT BLDA-MCNC: 1.6 MG/DL

## 2022-02-08 PROCEDURE — 82565 ASSAY OF CREATININE: CPT

## 2022-02-08 PROCEDURE — 0 IOPAMIDOL PER 1 ML: Performed by: NURSE PRACTITIONER

## 2022-02-08 PROCEDURE — 71275 CT ANGIOGRAPHY CHEST: CPT

## 2022-02-08 RX ADMIN — IOPAMIDOL 100 ML: 755 INJECTION, SOLUTION INTRAVENOUS at 11:48

## 2022-02-21 ENCOUNTER — TRANSCRIBE ORDERS (OUTPATIENT)
Dept: GENERAL RADIOLOGY | Facility: HOSPITAL | Age: 67
End: 2022-02-21

## 2022-02-21 ENCOUNTER — HOSPITAL ENCOUNTER (OUTPATIENT)
Dept: GENERAL RADIOLOGY | Facility: HOSPITAL | Age: 67
Discharge: HOME | End: 2022-02-21
Admitting: NURSE PRACTITIONER

## 2022-02-21 ENCOUNTER — TRANSCRIBE ORDERS (OUTPATIENT)
Dept: LAB | Facility: HOSPITAL | Age: 67
End: 2022-02-21

## 2022-02-21 DIAGNOSIS — R52 PAIN: Primary | ICD-10-CM

## 2022-02-21 DIAGNOSIS — R52 PAIN: ICD-10-CM

## 2022-02-21 PROCEDURE — 71101 X-RAY EXAM UNILAT RIBS/CHEST: CPT

## 2022-03-03 ENCOUNTER — OFFICE VISIT (OUTPATIENT)
Dept: PULMONOLOGY | Facility: CLINIC | Age: 67
End: 2022-03-03

## 2022-03-03 VITALS
SYSTOLIC BLOOD PRESSURE: 149 MMHG | OXYGEN SATURATION: 100 % | WEIGHT: 202 LBS | DIASTOLIC BLOOD PRESSURE: 60 MMHG | RESPIRATION RATE: 16 BRPM | HEIGHT: 67 IN | BODY MASS INDEX: 31.71 KG/M2 | TEMPERATURE: 98.2 F | HEART RATE: 59 BPM

## 2022-03-03 DIAGNOSIS — I27.82 CHRONIC PULMONARY EMBOLISM, UNSPECIFIED PULMONARY EMBOLISM TYPE, UNSPECIFIED WHETHER ACUTE COR PULMONALE PRESENT: ICD-10-CM

## 2022-03-03 DIAGNOSIS — E78.5 HYPERLIPIDEMIA, UNSPECIFIED HYPERLIPIDEMIA TYPE: ICD-10-CM

## 2022-03-03 DIAGNOSIS — I48.91 ATRIAL FIBRILLATION, UNSPECIFIED TYPE: ICD-10-CM

## 2022-03-03 DIAGNOSIS — R06.09 OTHER FORM OF DYSPNEA: ICD-10-CM

## 2022-03-03 DIAGNOSIS — I27.82 CHRONIC PULMONARY EMBOLISM, UNSPECIFIED PULMONARY EMBOLISM TYPE, UNSPECIFIED WHETHER ACUTE COR PULMONALE PRESENT: Primary | ICD-10-CM

## 2022-03-03 DIAGNOSIS — M05.79 RHEUMATOID ARTHRITIS INVOLVING MULTIPLE SITES WITH POSITIVE RHEUMATOID FACTOR: ICD-10-CM

## 2022-03-03 DIAGNOSIS — R79.89 SERUM CREATININE RAISED: Primary | ICD-10-CM

## 2022-03-03 DIAGNOSIS — J96.01 ACUTE RESPIRATORY FAILURE WITH HYPOXIA: ICD-10-CM

## 2022-03-03 DIAGNOSIS — I10 HYPERTENSION, UNSPECIFIED TYPE: ICD-10-CM

## 2022-03-03 PROBLEM — I45.10 RIGHT BUNDLE-BRANCH BLOCK: Status: ACTIVE | Noted: 2017-12-11

## 2022-03-03 PROBLEM — J90 PLEURISY WITH EFFUSION, WITH MENTION OF BACTERIAL CAUSE OTHER THAN TUBERCULOSIS: Status: ACTIVE | Noted: 2021-11-29

## 2022-03-03 PROBLEM — J18.9 PNEUMONIA: Status: ACTIVE | Noted: 2021-11-29

## 2022-03-03 PROBLEM — K21.9 GASTRIC REFLUX: Status: ACTIVE | Noted: 2022-03-03

## 2022-03-03 PROBLEM — B96.89 PLEURISY WITH EFFUSION, WITH MENTION OF BACTERIAL CAUSE OTHER THAN TUBERCULOSIS: Status: ACTIVE | Noted: 2021-11-29

## 2022-03-03 PROBLEM — F32.A DEPRESSIVE DISORDER: Status: ACTIVE | Noted: 2021-05-06

## 2022-03-03 PROBLEM — Z01.818 PREOPERATIVE EXAMINATION, UNSPECIFIED: Status: ACTIVE | Noted: 2020-12-08

## 2022-03-03 PROBLEM — I80.9 PHLEBITIS: Status: ACTIVE | Noted: 2022-03-03

## 2022-03-03 PROBLEM — I34.1 MITRAL VALVE PROLAPSE: Status: ACTIVE | Noted: 2022-03-03

## 2022-03-03 PROCEDURE — 99214 OFFICE O/P EST MOD 30 MIN: CPT

## 2022-03-03 RX ORDER — CETIRIZINE HYDROCHLORIDE 10 MG/1
TABLET ORAL
COMMUNITY
Start: 2022-01-05

## 2022-03-03 RX ORDER — AMOXICILLIN 500 MG/1
CAPSULE ORAL
COMMUNITY
Start: 2022-03-02 | End: 2022-04-04 | Stop reason: ALTCHOICE

## 2022-03-03 RX ORDER — LISINOPRIL 10 MG/1
10 TABLET ORAL DAILY
COMMUNITY
Start: 2022-02-07

## 2022-03-03 RX ORDER — ALBUTEROL SULFATE 90 UG/1
2 AEROSOL, METERED RESPIRATORY (INHALATION) EVERY 4 HOURS PRN
Qty: 18 G | Refills: 12 | Status: SHIPPED | OUTPATIENT
Start: 2022-03-03 | End: 2022-04-02

## 2022-03-03 RX ORDER — IPRATROPIUM BROMIDE AND ALBUTEROL SULFATE 2.5; .5 MG/3ML; MG/3ML
3 SOLUTION RESPIRATORY (INHALATION) EVERY 4 HOURS PRN
Qty: 360 ML | Refills: 0
Start: 2022-03-03 | End: 2022-08-16

## 2022-03-03 RX ORDER — TIZANIDINE 4 MG/1
4 TABLET ORAL
COMMUNITY
Start: 2022-02-22 | End: 2022-04-04

## 2022-03-03 RX ORDER — PREDNISONE 1 MG/1
TABLET ORAL
COMMUNITY
End: 2022-04-04

## 2022-03-03 NOTE — PROGRESS NOTES
Primary Care Provider  Annetta Austin APRN   Referring Provider  No ref. provider found      Patient Complaint  Pulmonary Embolism, Follow-up, and Shortness of Breath (with exertion )      Subjective          Patsy Tobin presents to [facility] PULMONARY & CRITICAL CARE MEDICINE    History of Presenting Illness    Patsy Tobin is a 66 y.o. female here today at [facility] pulmonary critical care for follow-up 3-month follow-up post hospital inpatient.  Due to multiple pulmonary emboli noted on 12/7/2021 patient was admitted to the ED at [facility] arriving there from her primary care in [facility].  She was seen in the office on 12/23/2021 by ADRIENNE Myers.  At that time Justina had  scheduled her for a follow-up CT which patient completed on 1/14/2022.  Today she is here in the office to follow-up further and says that she is having shortness of air though her sats are ranging from 98 to 100%.    Patient is positive for dyspnea/shortness of air.  Denies cough wheezing headaches chest pain unintentional weight loss hemoptysis fever chills night sweats, denies swollen lymph nodes and glands of the head and neck.  She states that she has no difficulty completing all of her ADLs.  .    I have personally reviewed the review of systems, past family, social, medical and surgical histories; and agree with their findings.      Review of Systems  Constitutional symptoms:  Denied complaints   Ear, nose, throat: Denied complaints  Cardiovascular: .Denied complaints  Respiratory:  Increased shortness of air and dyspnea upon rest/exertion  Gastrointestinal: Denied complaints  Musculoskeletal: Denied complaints        Family History   Problem Relation Age of Onset   • Cancer Father         kidney   • Hypertension Mother    • Diabetes Maternal Grandmother    • Colon polyps Brother    • Colon cancer Neg Hx         Social History     Socioeconomic History    • Marital status:      Spouse name: Jose   • Years of education: High school   Tobacco Use   • Smoking status: Former Smoker     Packs/day: 1.00     Years: 22.00     Pack years: 22.00     Types: Cigarettes     Quit date:      Years since quittin.1   • Smokeless tobacco: Never Used   Vaping Use   • Vaping Use: Never used   Substance and Sexual Activity   • Alcohol use: Yes     Alcohol/week: 0.0 standard drinks     Comment: Social drinker, do not drink everyday   • Drug use: No   • Sexual activity: Yes     Partners: Male     Birth control/protection: Surgical     Comment: Hysterectomy in         Past Medical History:   Diagnosis Date   • Abdominal pain of multiple sites    • Allergic rhinitis    • AMEYA positive    • Blurred vision, bilateral    • Deep vein thrombosis (HCC)    • Depression    • Diarrhea    • Esophagitis    • Fatigue    • Gastric ulcer    • GERD (gastroesophageal reflux disease)    • Headache    • High blood pressure    • Hypertension    • Leg cramps    • Leg wound, right    • Mitral valve prolapse    • Multiple joint pain    • Overweight    • Peptic ulceration    • PONV (postoperative nausea and vomiting)    • Pulmonary embolism (HCC) 2021   • RA (rheumatoid arthritis) (HCC)    • Superficial phlebitis     in left ankle    • Tattoos     permanent makeup         Immunization History   Administered Date(s) Administered   • COVID-19 (PFIZER) PURPLE CAP 2021, 2021, 01/10/2022   • Flu Vaccine Split Quad 2015, 10/14/2016   • FluLaval/Fluarix/Fluzone >6 10/15/2019   • Fluad Quad 65+ 10/01/2020, 2021, 2021   • Hepatitis A 2018, 2018   • Influenza, Unspecified 2012, 10/15/2019   • Pneumococcal Conjugate 13-Valent (PCV13) 2020   • Pneumococcal Polysaccharide (PPSV23) 2019   • flucelvax quad pfs =>4 YRS 10/30/2017, 10/16/2018         Allergies   Allergen Reactions   • Celebrex [Celecoxib] Other (See Comments)     Passed Out     • Arava [Leflunomide] Other (See Comments)     Altered Glucose Levels   • Duloxetine Hallucinations   • Naproxen GI Intolerance     All NSAIDS    • Nsaids GI Intolerance     Other reaction(s): Stomach upset     • Plaquenil [Hydroxychloroquine Sulfate] Mental Status Change          Current Outpatient Medications:   •  alosetron (LOTRONEX) 0.5 MG tablet, Take 1 tablet by mouth 2 (Two) Times a Day As Needed (diarrhea)., Disp: 60 tablet, Rfl: 5  •  amoxicillin (AMOXIL) 500 MG capsule, , Disp: , Rfl:   •  apixaban (ELIQUIS) 5 MG tablet tablet, Take 1 tablet twice a day., Disp: 60 tablet, Rfl: 11  •  aspirin 81 MG chewable tablet, Chew 81 mg Daily., Disp: , Rfl:   •  cetirizine (zyrTEC) 10 MG tablet, TAKE 1 TABLET BY MOUTH IN THE MORNING AS NEEDED, Disp: , Rfl:   •  etanercept (ENBREL) 50 MG/ML solution prefilled syringe injection, Inject 50 mg under the skin 1 (One) Time Per Week., Disp: , Rfl:   •  fluticasone (FLONASE) 50 MCG/ACT nasal spray, 2 sprays into the nostril(s) as directed by provider Daily., Disp: , Rfl:   •  fluticasone-salmeterol (Advair Diskus) 250-50 MCG/DOSE DISKUS, Inhale 1 puff 2 (Two) Times a Day for 30 days., Disp: 60 each, Rfl: 2  •  hyoscyamine (LEVSIN) 0.125 MG SL tablet, Take 1 tablet by mouth Every 6 (Six) Hours As Needed (esophageal spasm)., Disp: 60 tablet, Rfl: 2  •  lisinopril (PRINIVIL,ZESTRIL) 20 MG tablet, Take 20 mg by mouth Daily., Disp: , Rfl:   •  lovastatin (MEVACOR) 20 MG tablet, Take 20 mg by mouth Every Night., Disp: , Rfl:   •  metoprolol succinate XL (TOPROL-XL) 25 MG 24 hr tablet, Take 1 tablet by mouth Daily., Disp: 90 tablet, Rfl: 3  •  omeprazole (priLOSEC) 40 MG capsule, Take 1 capsule by mouth once daily, Disp: 90 capsule, Rfl: 0  •  predniSONE (DELTASONE) 5 MG tablet, take 2 tabs PO  as needed for RA flares, Disp: , Rfl:   •  tiZANidine (ZANAFLEX) 4 MG tablet, Take 4 mg by mouth every night at bedtime., Disp: , Rfl:   •  ipratropium-albuterol (DUO-NEB) 0.5-2.5 mg/3 ml  "nebulizer, Take 3 mL by nebulization Every 4 (Four) Hours As Needed for Wheezing for up to 30 days., Disp: 360 mL, Rfl: 0  •  lisinopril (PRINIVIL,ZESTRIL) 10 MG tablet, Take 10 mg by mouth Daily., Disp: , Rfl:   •  meloxicam (MOBIC) 15 MG tablet, Take 15 mg by mouth Daily., Disp: , Rfl:   •  O2 (OXYGEN), Inhale 2 L/min 1 (One) Time., Disp: , Rfl:   •  Trintellix 5 MG tablet, Take 5 mg by mouth Daily., Disp: , Rfl:          Objective     Vital Signs:   /60 (BP Location: Right arm, Patient Position: Sitting, Cuff Size: Adult)   Pulse 59   Temp 98.2 °F (36.8 °C) (Tympanic)   Resp 16   Ht 170.2 cm (67\")   Wt 91.6 kg (202 lb)   SpO2 100% Comment: Room air  BMI 31.64 kg/m²     Physical Exam  Vital Signs Reviewed   WDWN, Alert, NAD.    HEENT:  PERRL, EOMI.  OP, nares clear  Neck:  Supple, no JVD, no thyroidomegaly  Chest:   Patient is clear to auscultation no adventitious lung sounds noted, somewhat dull to percussion bilaterally no visible work of breathing increased noted CV: RRR, no MGR, pulses 2+, equal.  Abd:  Soft, NT, ND, + BS, no HSM  EXT:  no clubbing, no cyanosis, no edema no pain no redness  in patient's lower extremities.  Neuro:  A&Ox3, CN grossly intact, no focal deficits.  Skin: No rashes or lesions noted       Result Review :   I have personally reviewed the the patient's labs results imaging, procedures, all medications .  CT scan showed chronic pulmonary embolic disease in lower bilateral lobes,andf the abnormal density in the  lateral aspect of the right upper lobe along the minor fissure has significantly decreased in size there are no new infiltrates no evidence of acute pulmonary embolic disease  I discussed findings with patient she acknowledged understanding and agreed.  Reviewed labs with patient from 2/8/2022.           Assessment and Plan      Patient Active Problem List   Diagnosis   • Thrombophlebitis leg   • Rectal bleeding   • Anemia   • Diarrhea   • Gastroesophageal reflux " disease   • Chronic pulmonary embolism (HCC)   • Depressive disorder   • Gastric reflux   • Hyperlipidemia   • Hypertension   • Mitral valve prolapse   • Phlebitis   • Pleurisy with effusion, with mention of bacterial cause other than tuberculosis   • Pneumonia   • Rheumatoid arthritis (HCC)   • Preoperative examination, unspecified   • Right bundle-branch block   • Serum creatinine raised   • Overweight            Impression:  Patient was in the office for shortness of air after pulmonary embolism hospitalization on 12/721.  Her CT scan  looks remarkably better with chronic pulmonary embolic disease noted in the bases but they are not obstructive according to the CT findings and the abnormality in the right upper lobe along the fissure is significantly better.  No new pulmonary emboli noted on the CT.  However patient's kidney function is decreased GFR 32 and creatinine 1.6  Plan:  Patient short of air will order the duo nebs that were ordered prior and get preauthorization for insurance if needed.  Order rescue albuterol inhaler for increased shortness of air as needed.  Order a BMP and troponin to be done in the next 2 weeks.  Smoking status: In remission as of 1988  Vaccination status: Patient declines flu, pneumonia shot, has had 2 Covid shots and Covid booster.  Medications personally reviewed    Follow Up   No follow-ups on file.  Patient was given instructions and counseling regarding her condition or for health maintenance advice. Please see specific information pulled into the AVS if appropriate.

## 2022-03-07 ENCOUNTER — APPOINTMENT (OUTPATIENT)
Dept: CT IMAGING | Facility: HOSPITAL | Age: 67
End: 2022-03-07

## 2022-03-07 ENCOUNTER — TRANSCRIBE ORDERS (OUTPATIENT)
Dept: ADMINISTRATIVE | Facility: HOSPITAL | Age: 67
End: 2022-03-07

## 2022-03-07 DIAGNOSIS — I82.409 DEEP VEIN THROMBOSIS PROGRESSION: ICD-10-CM

## 2022-03-07 DIAGNOSIS — R09.89 BRUIT: ICD-10-CM

## 2022-03-07 DIAGNOSIS — Z00.00 HEALTHCARE MAINTENANCE: ICD-10-CM

## 2022-03-07 DIAGNOSIS — Z87.891 PERSONAL HISTORY OF TOBACCO USE, PRESENTING HAZARDS TO HEALTH: Primary | ICD-10-CM

## 2022-03-07 DIAGNOSIS — Z87.891 AGGRESSIVE EX-SMOKER: ICD-10-CM

## 2022-03-07 DIAGNOSIS — I26.99 PULMONARY INFARCTION: ICD-10-CM

## 2022-03-08 ENCOUNTER — TELEPHONE (OUTPATIENT)
Dept: PULMONOLOGY | Facility: CLINIC | Age: 67
End: 2022-03-08

## 2022-03-08 NOTE — TELEPHONE ENCOUNTER
Coni from [facility] in [place] called and left a voicemail needing patient's last office note faxed over to them. Please fax office note to [phone] or call [phone] with any questions. Thank you!

## 2022-03-10 ENCOUNTER — PATIENT ROUNDING (BHMG ONLY) (OUTPATIENT)
Dept: PULMONOLOGY | Facility: CLINIC | Age: 67
End: 2022-03-10

## 2022-03-10 NOTE — PROGRESS NOTES
March 10, 2022    Hello, may I speak with Patsy Tobin?    My name is Regina      I am  with [facility] PULMONARY & CRITICAL CARE MEDICINE  [address]  [address]  [place] [id number] [phone].    Before we get started may I verify your date of birth? 1955    I am calling to officially welcome you to our practice and ask about your recent visit. Is this a good time to talk? Yes    Tell me about your visit with us. What things went well? Patient stated Michell was very thorough, and visit was good.       We're always looking for ways to make our patients' experiences even better. Do you have recommendations on ways we may improve?  NO    Overall were you satisfied with your first visit to our practice? This was a follow up appointment that was rounded on.  Patient was satisfied with visit to office.       I appreciate you taking the time to speak with me today. Is there anything else I can do for you? Not at this time, patient will contact office if she needs anything.      Thank you, and have a great day.

## 2022-03-14 ENCOUNTER — LAB (OUTPATIENT)
Dept: LAB | Facility: HOSPITAL | Age: 67
End: 2022-03-14

## 2022-03-14 DIAGNOSIS — I27.82 CHRONIC PULMONARY EMBOLISM, UNSPECIFIED PULMONARY EMBOLISM TYPE, UNSPECIFIED WHETHER ACUTE COR PULMONALE PRESENT: ICD-10-CM

## 2022-03-14 DIAGNOSIS — R06.09 OTHER FORM OF DYSPNEA: ICD-10-CM

## 2022-03-14 PROCEDURE — 83880 ASSAY OF NATRIURETIC PEPTIDE: CPT

## 2022-03-14 PROCEDURE — 84484 ASSAY OF TROPONIN QUANT: CPT

## 2022-03-14 PROCEDURE — 36415 COLL VENOUS BLD VENIPUNCTURE: CPT

## 2022-03-15 LAB
NT-PROBNP SERPL-MCNC: 308 PG/ML (ref 0–900)
TROPONIN T SERPL-MCNC: <0.01 NG/ML (ref 0–0.03)

## 2022-03-17 ENCOUNTER — HOSPITAL ENCOUNTER (OUTPATIENT)
Dept: CARDIOLOGY | Facility: HOSPITAL | Age: 67
Discharge: HOME | End: 2022-03-17

## 2022-03-17 ENCOUNTER — APPOINTMENT (OUTPATIENT)
Dept: CARDIOLOGY | Facility: HOSPITAL | Age: 67
End: 2022-03-17

## 2022-03-17 DIAGNOSIS — Z87.891 AGGRESSIVE EX-SMOKER: ICD-10-CM

## 2022-03-17 DIAGNOSIS — Z00.00 HEALTHCARE MAINTENANCE: ICD-10-CM

## 2022-03-17 DIAGNOSIS — I82.409 DEEP VEIN THROMBOSIS PROGRESSION: ICD-10-CM

## 2022-03-17 DIAGNOSIS — I26.99 PULMONARY INFARCTION: ICD-10-CM

## 2022-03-17 DIAGNOSIS — R09.89 BRUIT: ICD-10-CM

## 2022-03-17 LAB
BH CV LOW VAS LEFT GASTRONEMIUS VESSEL: 1
BH CV LOW VAS LEFT POPLITEAL SPONT: 1
BH CV LOW VAS LEFT POSTERIOR TIBIAL VESSEL: 1
BH CV LOWER VASCULAR LEFT COMMON FEMORAL AUGMENT: NORMAL
BH CV LOWER VASCULAR LEFT COMMON FEMORAL COMPETENT: NORMAL
BH CV LOWER VASCULAR LEFT COMMON FEMORAL COMPRESS: NORMAL
BH CV LOWER VASCULAR LEFT COMMON FEMORAL PHASIC: NORMAL
BH CV LOWER VASCULAR LEFT COMMON FEMORAL SPONT: NORMAL
BH CV LOWER VASCULAR LEFT DISTAL FEMORAL COMPRESS: NORMAL
BH CV LOWER VASCULAR LEFT GASTRONEMIUS COMPRESS: NORMAL
BH CV LOWER VASCULAR LEFT GASTRONEMIUS THROMBUS: NORMAL
BH CV LOWER VASCULAR LEFT GREATER SAPH AK COMPRESS: NORMAL
BH CV LOWER VASCULAR LEFT GREATER SAPH BK COMPRESS: NORMAL
BH CV LOWER VASCULAR LEFT LESSER SAPH COMPRESS: NORMAL
BH CV LOWER VASCULAR LEFT MID FEMORAL AUGMENT: NORMAL
BH CV LOWER VASCULAR LEFT MID FEMORAL COMPETENT: NORMAL
BH CV LOWER VASCULAR LEFT MID FEMORAL COMPRESS: NORMAL
BH CV LOWER VASCULAR LEFT MID FEMORAL PHASIC: NORMAL
BH CV LOWER VASCULAR LEFT MID FEMORAL SPONT: NORMAL
BH CV LOWER VASCULAR LEFT PERONEAL COMPRESS: NORMAL
BH CV LOWER VASCULAR LEFT POPLITEAL AUGMENT: NORMAL
BH CV LOWER VASCULAR LEFT POPLITEAL COMPRESS: NORMAL
BH CV LOWER VASCULAR LEFT POPLITEAL PHASIC: NORMAL
BH CV LOWER VASCULAR LEFT POPLITEAL SPONT: NORMAL
BH CV LOWER VASCULAR LEFT POPLITEAL THROMBUS: NORMAL
BH CV LOWER VASCULAR LEFT POSTERIOR TIBIAL COMPRESS: NORMAL
BH CV LOWER VASCULAR LEFT POSTERIOR TIBIAL THROMBUS: NORMAL
BH CV LOWER VASCULAR LEFT PROFUNDA FEMORAL COMPRESS: NORMAL
BH CV LOWER VASCULAR LEFT PROXIMAL FEMORAL COMPRESS: NORMAL
BH CV LOWER VASCULAR LEFT SAPHENOFEMORAL JUNCTION COMPRESS: NORMAL
BH CV LOWER VASCULAR RIGHT COMMON FEMORAL AUGMENT: NORMAL
BH CV LOWER VASCULAR RIGHT COMMON FEMORAL COMPETENT: NORMAL
BH CV LOWER VASCULAR RIGHT COMMON FEMORAL COMPRESS: NORMAL
BH CV LOWER VASCULAR RIGHT COMMON FEMORAL PHASIC: NORMAL
BH CV LOWER VASCULAR RIGHT COMMON FEMORAL SPONT: NORMAL
BH CV XLRA MEAS LEFT CAROTID BULB EDV: 21 CM/SEC
BH CV XLRA MEAS LEFT CAROTID BULB PSV: 69 CM/SEC
BH CV XLRA MEAS LEFT DIST CCA EDV: 18 CM/SEC
BH CV XLRA MEAS LEFT DIST CCA PSV: 66 CM/SEC
BH CV XLRA MEAS LEFT DIST ICA EDV: 32 CM/SEC
BH CV XLRA MEAS LEFT DIST ICA PSV: 90 CM/SEC
BH CV XLRA MEAS LEFT MID ICA EDV: 28 CM/SEC
BH CV XLRA MEAS LEFT MID ICA PSV: 71 CM/SEC
BH CV XLRA MEAS LEFT PROX CCA EDV: 17 CM/SEC
BH CV XLRA MEAS LEFT PROX CCA PSV: 81 CM/SEC
BH CV XLRA MEAS LEFT PROX ECA EDV: 21 CM/SEC
BH CV XLRA MEAS LEFT PROX ECA PSV: 113 CM/SEC
BH CV XLRA MEAS LEFT PROX ICA EDV: 22 CM/SEC
BH CV XLRA MEAS LEFT PROX ICA PSV: 63 CM/SEC
BH CV XLRA MEAS LEFT VERTEBRAL A EDV: 13 CM/SEC
BH CV XLRA MEAS LEFT VERTEBRAL A PSV: 40 CM/SEC
BH CV XLRA MEAS RIGHT CAROTID BULB EDV: 12 CM/SEC
BH CV XLRA MEAS RIGHT CAROTID BULB PSV: 69 CM/SEC
BH CV XLRA MEAS RIGHT DIST CCA EDV: 17 CM/SEC
BH CV XLRA MEAS RIGHT DIST CCA PSV: 60 CM/SEC
BH CV XLRA MEAS RIGHT DIST ICA EDV: 24 CM/SEC
BH CV XLRA MEAS RIGHT DIST ICA PSV: 73 CM/SEC
BH CV XLRA MEAS RIGHT MID ICA EDV: 26 CM/SEC
BH CV XLRA MEAS RIGHT MID ICA PSV: 91 CM/SEC
BH CV XLRA MEAS RIGHT PROX CCA EDV: 19 CM/SEC
BH CV XLRA MEAS RIGHT PROX CCA PSV: 71 CM/SEC
BH CV XLRA MEAS RIGHT PROX ECA EDV: 11 CM/SEC
BH CV XLRA MEAS RIGHT PROX ECA PSV: 72 CM/SEC
BH CV XLRA MEAS RIGHT PROX ICA EDV: 19 CM/SEC
BH CV XLRA MEAS RIGHT PROX ICA PSV: 70 CM/SEC
BH CV XLRA MEAS RIGHT VERTEBRAL A EDV: 15 CM/SEC
BH CV XLRA MEAS RIGHT VERTEBRAL A PSV: 40 CM/SEC
LEFT ARM BP: NORMAL MMHG
MAXIMAL PREDICTED HEART RATE: 153 BPM
MAXIMAL PREDICTED HEART RATE: 153 BPM
RIGHT ARM BP: NORMAL MMHG
STRESS TARGET HR: 130 BPM
STRESS TARGET HR: 130 BPM

## 2022-03-17 PROCEDURE — 93880 EXTRACRANIAL BILAT STUDY: CPT

## 2022-03-17 PROCEDURE — 93971 EXTREMITY STUDY: CPT

## 2022-03-17 PROCEDURE — 93880 EXTRACRANIAL BILAT STUDY: CPT | Performed by: SURGERY

## 2022-03-17 PROCEDURE — 93971 EXTREMITY STUDY: CPT | Performed by: SURGERY

## 2022-03-22 RX ORDER — OMEPRAZOLE 40 MG/1
CAPSULE, DELAYED RELEASE ORAL
Qty: 90 CAPSULE | Refills: 0 | Status: SHIPPED | OUTPATIENT
Start: 2022-03-22 | End: 2022-07-12 | Stop reason: SDUPTHER

## 2022-04-04 ENCOUNTER — OFFICE VISIT (OUTPATIENT)
Dept: PULMONOLOGY | Facility: CLINIC | Age: 67
End: 2022-04-04

## 2022-04-04 VITALS
RESPIRATION RATE: 18 BRPM | WEIGHT: 197.2 LBS | DIASTOLIC BLOOD PRESSURE: 61 MMHG | HEART RATE: 56 BPM | TEMPERATURE: 97.3 F | BODY MASS INDEX: 30.95 KG/M2 | SYSTOLIC BLOOD PRESSURE: 133 MMHG | OXYGEN SATURATION: 100 % | HEIGHT: 67 IN

## 2022-04-04 DIAGNOSIS — I80.3 THROMBOPHLEBITIS LEG: Primary | ICD-10-CM

## 2022-04-04 DIAGNOSIS — K21.00 GASTROESOPHAGEAL REFLUX DISEASE WITH ESOPHAGITIS WITHOUT HEMORRHAGE: ICD-10-CM

## 2022-04-04 DIAGNOSIS — I27.82 CHRONIC PULMONARY EMBOLISM, UNSPECIFIED PULMONARY EMBOLISM TYPE, UNSPECIFIED WHETHER ACUTE COR PULMONALE PRESENT: ICD-10-CM

## 2022-04-04 PROCEDURE — 99214 OFFICE O/P EST MOD 30 MIN: CPT | Performed by: INTERNAL MEDICINE

## 2022-04-04 RX ORDER — LORATADINE 10 MG/1
10 TABLET ORAL DAILY
COMMUNITY
Start: 2022-03-18 | End: 2022-05-12

## 2022-04-04 RX ORDER — FLUCONAZOLE 100 MG/1
150 TABLET ORAL DAILY
Qty: 11 TABLET | Refills: 0 | Status: SHIPPED | OUTPATIENT
Start: 2022-04-04 | End: 2022-04-11

## 2022-04-04 NOTE — PROGRESS NOTES
Primary Care Provider  Annetta Austin APRN     Referring Provider  No ref. provider found     Chief Complaint  Follow-up, Pneumonia, and Shortness of Breath (3 month follow up)    Subjective          Patsy Tobin presents to [facility]MON[facility]ICINE  History of Present Illness  Patsy Tobin is a 66 y.o. female patient with history of provoked pulmonary embolism in December 2021, obesity, chronic hypoxic respiratory, rheumatoid arthritis.  She is here for follow-up.  Since her last office visit she had lower extremity ultrasound which showed persistent subacute DVT.  She had repeat CT scan of the chest earlier this year which showed persistent pulmonary embolism but improved clot burden.  There was infiltrate in the right upper chest, which has improved as well.  She complains of some discomfort on the upper mid chest.  She has problem with hoarseness and difficulty speaking.  She is currently using Advair 1 puff twice daily.  She has very sparingly been using albuterol.  She is short of breath with activities.  Her oxygen saturation is in mid to high 90s for most part.  She has not had pulmonary function test.  She is willing to go for pulmonary rehab.    Review of Systems     General:  No Fatigue, No Fever No weight loss or loss of appetite  HEENT: No dysphagia, No Visual Changes, no rhinorrhea  Respiratory: No cough,+Dyspnea, no phlegm, No Pleuritic Pain, no wheezing, no hemoptysis.  Cardiovascular: Denies chest pain, denies palpitations,+NGUYỄN, +Chest Pressure  Gastrointestinal:  No Abdominal Pain, No Nausea, No Vomiting, No Diarrhea  Genitourinary:  No Dysuria, No Frequency, No Hesitancy  Musculoskeletal: No muscle pain or swelling  Endocrine:  No Heat Intolerance, No Cold Intolerance  Hematologic:  No Bleeding, No Bruising  Psychiatric:  No Anxiety, No Depression  Neurologic:  No Confusion, no Dysarthria, No Headaches  Skin:  No Rash, No Open Wounds    Family  History   Problem Relation Age of Onset   • Cancer Father         kidney   • Hypertension Mother    • Diabetes Maternal Grandmother    • Colon polyps Brother    • Colon cancer Neg Hx         Social History     Socioeconomic History   • Marital status:      Spouse name: Jose   • Years of education: High school   Tobacco Use   • Smoking status: Former Smoker     Packs/day: 1.00     Years: 22.00     Pack years: 22.00     Types: Cigarettes     Quit date:      Years since quittin.2   • Smokeless tobacco: Never Used   Vaping Use   • Vaping Use: Never used   Substance and Sexual Activity   • Alcohol use: Yes     Alcohol/week: 0.0 standard drinks     Comment: Social drinker, do not drink everyday   • Drug use: No   • Sexual activity: Yes     Partners: Male     Birth control/protection: Surgical     Comment: Hysterectomy in         Past Medical History:   Diagnosis Date   • Abdominal pain of multiple sites    • Allergic rhinitis    • AMEYA positive    • Blurred vision, bilateral    • Deep vein thrombosis (HCC)    • Depression    • Diarrhea    • Esophagitis    • Fatigue    • Gastric ulcer    • GERD (gastroesophageal reflux disease)    • Headache    • High blood pressure    • Hypertension    • Leg cramps    • Leg wound, right    • Mitral valve prolapse    • Multiple joint pain    • Overweight    • Peptic ulceration    • PONV (postoperative nausea and vomiting)    • Pulmonary embolism (HCC) 2021   • RA (rheumatoid arthritis) (HCC)    • Superficial phlebitis     in left ankle    • Tattoos     permanent makeup         Immunization History   Administered Date(s) Administered   • COVID-19 (PFIZER) PURPLE CAP 2021, 2021, 01/10/2022   • Flu Vaccine Split Quad 2015, 10/14/2016   • FluLaval/Fluarix/Fluzone >6 10/15/2019   • Fluad Quad 65+ 10/01/2020, 2021, 2021   • Hepatitis A 2018, 2018   • Influenza, Unspecified 2012, 10/15/2019   • Pneumococcal Conjugate  13-Valent (PCV13) 03/24/2020   • Pneumococcal Polysaccharide (PPSV23) 02/19/2019   • flucelvax quad pfs =>4 YRS 10/30/2017, 10/16/2018         Allergies   Allergen Reactions   • Celebrex [Celecoxib] Other (See Comments)     Passed Out    • Arava [Leflunomide] Other (See Comments)     Altered Glucose Levels   • Duloxetine Hallucinations   • Naproxen GI Intolerance     All NSAIDS    • Nsaids GI Intolerance     Other reaction(s): Stomach upset     • Plaquenil [Hydroxychloroquine Sulfate] Mental Status Change          Current Outpatient Medications:   •  apixaban (ELIQUIS) 5 MG tablet tablet, Take 1 tablet twice a day., Disp: 60 tablet, Rfl: 11  •  aspirin 81 MG chewable tablet, Chew 81 mg Daily., Disp: , Rfl:   •  cetirizine (zyrTEC) 10 MG tablet, TAKE 1 TABLET BY MOUTH IN THE MORNING AS NEEDED, Disp: , Rfl:   •  etanercept (ENBREL) 50 MG/ML solution prefilled syringe injection, Inject 50 mg under the skin 1 (One) Time Per Week., Disp: , Rfl:   •  fluticasone (FLONASE) 50 MCG/ACT nasal spray, 2 sprays into the nostril(s) as directed by provider Daily., Disp: , Rfl:   •  lisinopril (PRINIVIL,ZESTRIL) 20 MG tablet, Take 20 mg by mouth Daily., Disp: , Rfl:   •  lovastatin (MEVACOR) 20 MG tablet, Take 20 mg by mouth Every Night., Disp: , Rfl:   •  metoprolol succinate XL (TOPROL-XL) 25 MG 24 hr tablet, Take 1 tablet by mouth Daily., Disp: 90 tablet, Rfl: 3  •  omeprazole (priLOSEC) 40 MG capsule, Take 1 capsule by mouth once daily, Disp: 90 capsule, Rfl: 0  •  Trintellix 5 MG tablet, Take 5 mg by mouth Daily., Disp: , Rfl:   •  alosetron (LOTRONEX) 0.5 MG tablet, Take 1 tablet by mouth 2 (Two) Times a Day As Needed (diarrhea)., Disp: 60 tablet, Rfl: 5  •  hyoscyamine (LEVSIN) 0.125 MG SL tablet, Take 1 tablet by mouth Every 6 (Six) Hours As Needed (esophageal spasm)., Disp: 60 tablet, Rfl: 2  •  ipratropium-albuterol (DUO-NEB) 0.5-2.5 mg/3 ml nebulizer, Take 3 mL by nebulization Every 4 (Four) Hours As Needed for Wheezing  "for up to 30 days., Disp: 360 mL, Rfl: 0  •  loratadine (CLARITIN) 10 MG tablet, Take 10 mg by mouth Daily., Disp: , Rfl:      Objective   Vital Signs:   /61 (BP Location: Right arm, Patient Position: Sitting, Cuff Size: Adult)   Pulse 56   Temp 97.3 °F (36.3 °C) (Tympanic)   Resp 18   Ht 170.2 cm (67\")   Wt 89.4 kg (197 lb 3.2 oz)   SpO2 100% Comment: room air  BMI 30.89 kg/m²       Mallampatti classification : 1  Physical Exam  Vital Signs Reviewed  Pleasant, obese female, in no acute distress, normal conversation, some hoarseness of voice  HEENT:  PERRL, EOMI.  OP, nares clear, no sinus tenderness, no oral thrush  Neck:  Supple, no JVD, no thyromegaly  Lymph: no axillary, cervical, supraclavicular lymphadenopathy noted bilaterally  Chest:  good aeration, bilateral diminished breath sounds, no wheezing, crackles or rhonchi, resonant to percussion b/l  CV: RRR, no MGR, pulses 2+, equal.  Abd:  Soft, NT, ND, + BS, no HSM  EXT:  no clubbing, no cyanosis, trace left lower extremity edema+  Neuro:  A&Ox3, CN grossly intact, no focal deficits  Skin: No rashes or lesions noted     Result Review :   The following data was reviewed by: Richard Gentile MD on 04/04/2022:  Common labs    Common Labsle 12/8/21 12/8/21 12/8/21 12/8/21 12/9/21 12/9/21 2/8/22    0531 0531 0531 0531 [id number]    Glucose  126 (A)    80    BUN  36 (A)    45 (A)    Creatinine  1.45 (A)    1.61 (A) 1.60   eGFR Non  Am  36 (A)    32 (A)    Sodium  140    140    Potassium  3.7    3.7    Chloride  110 (A)    109 (A)    Calcium  9.0    8.7    Albumin  3.50    3.10 (A)    Total Bilirubin  0.2    0.2    Alkaline Phosphatase  79    68    AST (SGOT)  13    9    ALT (SGPT)  10    8    WBC   17.09 (A)  14.52 (A)     Hemoglobin   12.2  11.0 (A)     Hematocrit   37.3  33.9 (A)     Platelets   303  289     Total Cholesterol 225 (A)         Triglycerides 172 (A)         HDL Cholesterol 72 (A)         LDL Cholesterol  123 (A)         Hemoglobin " A1C    5.70 (A)      (A) Abnormal value       Comments are available for some flowsheets but are not being displayed.           CMP    CMP 12/8/21 12/9/21 2/8/22   Glucose 126 (A) 80    BUN 36 (A) 45 (A)    Creatinine 1.45 (A) 1.61 (A) 1.60   eGFR Non  Am 36 (A) 32 (A)    Sodium 140 140    Potassium 3.7 3.7    Chloride 110 (A) 109 (A)    Calcium 9.0 8.7    Albumin 3.50 3.10 (A)    Total Bilirubin 0.2 0.2    Alkaline Phosphatase 79 68    AST (SGOT) 13 9    ALT (SGPT) 10 8    (A) Abnormal value       Comments are available for some flowsheets but are not being displayed.           CBC    CBC 12/7/21 12/7/21 12/8/21 12/9/21    1300 2205     WBC 20.78 (A) 20.33 (A) 17.09 (A) 14.52 (A)   RBC 4.46 4.09 4.17 3.74 (A)   Hemoglobin 13.2 12.2 12.2 11.0 (A)   Hematocrit 38.7 36.4 37.3 33.9 (A)   MCV 86.8 89.0 89.4 90.6   MCH 29.6 29.8 29.3 29.4   MCHC 34.1 33.5 32.7 32.4   RDW 13.9 14.0 14.2 14.4   Platelets 324 349 303 289   (A) Abnormal value            CBC w/diff    CBC w/Diff 12/7/21 12/7/21 12/8/21 12/9/21    1300 2205     WBC 20.78 (A) 20.33 (A) 17.09 (A) 14.52 (A)   RBC 4.46 4.09 4.17 3.74 (A)   Hemoglobin 13.2 12.2 12.2 11.0 (A)   Hematocrit 38.7 36.4 37.3 33.9 (A)   MCV 86.8 89.0 89.4 90.6   MCH 29.6 29.8 29.3 29.4   MCHC 34.1 33.5 32.7 32.4   RDW 13.9 14.0 14.2 14.4   Platelets 324 349 303 289   Neutrophil Rel % 81.9 (A) 85.2 (A) 79.5 (A)    Immature Granulocyte Rel % 3.4 (A) 2.2 (A) 3.1 (A)    Lymphocyte Rel % 10.4 (A) 8.2 (A) 11.8 (A)    Monocyte Rel % 3.5 (A) 4.2 (A) 5.0    Eosinophil Rel % 0.3 0.0 (A) 0.0 (A)    Basophil Rel % 0.5 0.2 0.6    (A) Abnormal value            Data reviewed: Radiologic studies CT scan of the chest from January 2022 was reviewed.  It shows persistent pulmonary embolism but improved, and is nonobstructive.  Also shows pulmonary infarct right upper lobe            Assessment and Plan    Diagnoses and all orders for this visit:    1. Thrombophlebitis leg (Primary)  -     6 Minute  Walk Test; Future  -     Pulmonary Function Test; Future  -     Ambulatory Referral to Pulmonary Rehab  -     Overnight Sleep Oximetry Study; Future  -     NM Lung Ventilation Perfusion; Future    2. Chronic pulmonary embolism, unspecified pulmonary embolism type, unspecified whether acute cor pulmonale present (HCC)  -     6 Minute Walk Test; Future  -     Pulmonary Function Test; Future  -     Ambulatory Referral to Pulmonary Rehab  -     Overnight Sleep Oximetry Study; Future  -     NM Lung Ventilation Perfusion; Future    3. Gastroesophageal reflux disease with esophagitis without hemorrhage  -     6 Minute Walk Test; Future  -     Pulmonary Function Test; Future  -     Ambulatory Referral to Pulmonary Rehab  -     Overnight Sleep Oximetry Study; Future  -     NM Lung Ventilation Perfusion; Future      Continue with Eliquis 5 mg twice daily.  Stop Advair.  Start albuterol as needed.  If her rescue inhaler need is more than once a day, we will resume maintenance inhaler.  Check pulmonary function test and 6-minute walk test.  I will make a referral for pulmonary rehab today.    Repeat VQ scan in 3 to 4 months.  She already has echocardiogram ordered for July 2022.  Check overnight oximetry, may need oxygen with sleep back on.  Use DuoNeb as needed.  Continue with Flonase.  Continue omeprazole 40 mg once daily.  I have written for fluconazole 150 mg once daily for 7 days.    Follow Up   Return in about 4 months (around 8/4/2022).  Patient was given instructions and counseling regarding her condition or for health maintenance advice. Please see specific information pulled into the AVS if appropriate.       Electronically signed by Richard Gentile MD, 4/4/2022, 17:10 EDT.

## 2022-04-14 ENCOUNTER — TRANSCRIBE ORDERS (OUTPATIENT)
Dept: ADMINISTRATIVE | Facility: HOSPITAL | Age: 67
End: 2022-04-14

## 2022-04-14 DIAGNOSIS — R31.9 HEMATURIA, UNSPECIFIED TYPE: Primary | ICD-10-CM

## 2022-04-19 ENCOUNTER — TRANSCRIBE ORDERS (OUTPATIENT)
Dept: ADMINISTRATIVE | Facility: HOSPITAL | Age: 67
End: 2022-04-19

## 2022-04-19 DIAGNOSIS — Z01.818 OTHER SPECIFIED PRE-OPERATIVE EXAMINATION: Primary | ICD-10-CM

## 2022-04-20 ENCOUNTER — LAB (OUTPATIENT)
Dept: LAB | Facility: HOSPITAL | Age: 67
End: 2022-04-20

## 2022-04-20 DIAGNOSIS — Z01.818 OTHER SPECIFIED PRE-OPERATIVE EXAMINATION: ICD-10-CM

## 2022-04-20 LAB — SARS-COV-2 ORF1AB RESP QL NAA+PROBE: NOT DETECTED

## 2022-04-20 PROCEDURE — U0004 COV-19 TEST NON-CDC HGH THRU: HCPCS

## 2022-04-20 PROCEDURE — C9803 HOPD COVID-19 SPEC COLLECT: HCPCS

## 2022-04-22 ENCOUNTER — HOSPITAL ENCOUNTER (OUTPATIENT)
Dept: RESPIRATORY THERAPY | Facility: HOSPITAL | Age: 67
Discharge: HOME | End: 2022-04-22

## 2022-04-22 DIAGNOSIS — K21.00 GASTROESOPHAGEAL REFLUX DISEASE WITH ESOPHAGITIS WITHOUT HEMORRHAGE: ICD-10-CM

## 2022-04-22 DIAGNOSIS — I27.82 CHRONIC PULMONARY EMBOLISM, UNSPECIFIED PULMONARY EMBOLISM TYPE, UNSPECIFIED WHETHER ACUTE COR PULMONALE PRESENT: ICD-10-CM

## 2022-04-22 DIAGNOSIS — I80.3 THROMBOPHLEBITIS LEG: ICD-10-CM

## 2022-04-22 PROCEDURE — 94618 PULMONARY STRESS TESTING: CPT

## 2022-04-22 PROCEDURE — 94726 PLETHYSMOGRAPHY LUNG VOLUMES: CPT

## 2022-04-22 PROCEDURE — 94060 EVALUATION OF WHEEZING: CPT

## 2022-04-22 PROCEDURE — 94640 AIRWAY INHALATION TREATMENT: CPT

## 2022-04-22 PROCEDURE — 94729 DIFFUSING CAPACITY: CPT

## 2022-04-22 PROCEDURE — 94664 DEMO&/EVAL PT USE INHALER: CPT

## 2022-04-22 RX ORDER — ALBUTEROL SULFATE 2.5 MG/3ML
2.5 SOLUTION RESPIRATORY (INHALATION) ONCE
Status: COMPLETED | OUTPATIENT
Start: 2022-04-22 | End: 2022-04-22

## 2022-04-22 RX ADMIN — ALBUTEROL SULFATE 2.5 MG: 2.5 SOLUTION RESPIRATORY (INHALATION) at 15:02

## 2022-04-22 NOTE — PROGRESS NOTES
Exercise Oximetry    Patient Name:Patsy Tobin   MRN: [id number]   Date: 04/22/22             ROOM AIR BASELINE   SpO2% 96   Heart Rate 84   Blood Pressure      EXERCISE ON ROOM AIR SpO2% EXERCISE ON O2 @  LPM SpO2%   1 MINUTE 95 1 MINUTE    2 MINUTES 95 2 MINUTES    3 MINUTES 95 3 MINUTES    4 MINUTES 95 4 MINUTES    5 MINUTES 95 5 MINUTES    6 MINUTES 96 6 MINUTES               Distance Walked   Distance Walked   Dyspnea (Rohini Scale)   Dyspnea (Rohini Scale)   Fatigue (Rohini Scale)   Fatigue (Rohini Scale)   SpO2% Post Exercise  96 SpO2% Post Exercise   HR Post Exercise  95 HR Post Exercise   Time to Recovery   Time to Recovery     Comments:

## 2022-04-26 ENCOUNTER — TRANSCRIBE ORDERS (OUTPATIENT)
Dept: ADMINISTRATIVE | Facility: HOSPITAL | Age: 67
End: 2022-04-26

## 2022-04-26 ENCOUNTER — HOSPITAL ENCOUNTER (OUTPATIENT)
Dept: GENERAL RADIOLOGY | Facility: HOSPITAL | Age: 67
Discharge: HOME | End: 2022-04-26

## 2022-04-26 ENCOUNTER — HOSPITAL ENCOUNTER (OUTPATIENT)
Dept: CT IMAGING | Facility: HOSPITAL | Age: 67
Discharge: HOME | End: 2022-04-26

## 2022-04-26 DIAGNOSIS — M45.9 RHEUMATOID ARTHRITIS INVOLVING VERTEBRA, UNSPECIFIED WHETHER RHEUMATOID FACTOR PRESENT: ICD-10-CM

## 2022-04-26 DIAGNOSIS — R31.9 HEMATURIA, UNSPECIFIED TYPE: ICD-10-CM

## 2022-04-26 DIAGNOSIS — M54.50 LOW BACK PAIN, UNSPECIFIED BACK PAIN LATERALITY, UNSPECIFIED CHRONICITY, UNSPECIFIED WHETHER SCIATICA PRESENT: ICD-10-CM

## 2022-04-26 DIAGNOSIS — M54.50 ACUTE LOW BACK PAIN, UNSPECIFIED BACK PAIN LATERALITY, UNSPECIFIED WHETHER SCIATICA PRESENT: ICD-10-CM

## 2022-04-26 DIAGNOSIS — M54.50 LOW BACK PAIN, UNSPECIFIED BACK PAIN LATERALITY, UNSPECIFIED CHRONICITY, UNSPECIFIED WHETHER SCIATICA PRESENT: Primary | ICD-10-CM

## 2022-04-26 PROCEDURE — 72100 X-RAY EXAM L-S SPINE 2/3 VWS: CPT

## 2022-04-26 PROCEDURE — 74176 CT ABD & PELVIS W/O CONTRAST: CPT

## 2022-04-27 ENCOUNTER — TELEPHONE (OUTPATIENT)
Dept: CARDIAC REHAB | Facility: HOSPITAL | Age: 67
End: 2022-04-27

## 2022-04-27 NOTE — TELEPHONE ENCOUNTER
Contacted patient regarding Pulmonary Rehab. Pt said she is feeling much better at this time and does not feel the need for Rehab. She will discuss it again with the doctor at her July appointment and is willing to attend if he still thinks she needs it.

## 2022-05-12 ENCOUNTER — OFFICE VISIT (OUTPATIENT)
Dept: OBSTETRICS AND GYNECOLOGY | Facility: CLINIC | Age: 67
End: 2022-05-12

## 2022-05-12 VITALS
HEIGHT: 67 IN | WEIGHT: 197.8 LBS | DIASTOLIC BLOOD PRESSURE: 80 MMHG | BODY MASS INDEX: 31.04 KG/M2 | SYSTOLIC BLOOD PRESSURE: 138 MMHG

## 2022-05-12 DIAGNOSIS — R10.2 PELVIC PAIN: Primary | ICD-10-CM

## 2022-05-12 PROCEDURE — 99203 OFFICE O/P NEW LOW 30 MIN: CPT | Performed by: OBSTETRICS & GYNECOLOGY

## 2022-05-12 RX ORDER — ALBUTEROL SULFATE 90 UG/1
AEROSOL, METERED RESPIRATORY (INHALATION)
COMMUNITY
Start: 2022-04-04 | End: 2022-08-16

## 2022-05-12 NOTE — PROGRESS NOTES
"        REASON FOR FOLLOWUP/CHIEF COMPLAINT: Pelvic pain     HISTORY OF PRESENT ILLNESS: Patient has had some health issues in the past year that have led to some imaging studies including a CT the abdomen and pelvis recently.  She had some diverticular findings as well as a comment made on an enlargement of the vaginal cuff and cervical area.  The patient gives a history of having had a complete hysterectomy including removal of the cervix in 1988.  She had been a longtime patient of mine in my previous office.  Denies any vaginal bleeding.      Past Medical History, Past Surgical History, Social History, Family History have been reviewed and are without significant changes except as mentioned.    Review of Systems   Review of Systems    Medications:  The current medication list was reviewed in the EMR    ALLERGIES:    Allergies   Allergen Reactions   • Celebrex [Celecoxib] Other (See Comments)     Passed Out    • Arava [Leflunomide] Other (See Comments)     Altered Glucose Levels   • Duloxetine Hallucinations   • Naproxen GI Intolerance     All NSAIDS    • Nsaids GI Intolerance     Other reaction(s): Stomach upset     • Plaquenil [Hydroxychloroquine Sulfate] Mental Status Change              Vitals:    05/12/22 1049   BP: 138/80   Weight: 89.7 kg (197 lb 12.8 oz)   Height: 170.2 cm (67\")     Physical Exam    CONSTITUTIONAL:  Vital signs reviewed.  No distress, looks comfortable.    GASTROINTESTINAL: Abdomen appears unremarkable.  Nontender.  No hepatomegaly.  No splenomegaly.    PELVIC: Normal external genitalia.  Normal vaginal sidewalls and vaginal cuff on speculum exam.  Bimanual exam shows absent cervix and mobile cuff with no lesions and no mass palpated.  There is some tenderness when palpating and moving toward the patient's right side.  .  PSYCHIATRIC:  Normal judgment and insight.  Normal mood and affect.       RECENT LABS:  WBC   Date Value Ref Range Status   12/09/2021 14.52 (H) 3.40 - 10.80 10*3/mm3 " Final   12/08/2021 17.09 (H) 3.40 - 10.80 10*3/mm3 Final   12/07/2021 20.33 (H) 3.40 - 10.80 10*3/mm3 Final   12/07/2021 20.78 (H) 3.40 - 10.80 10*3/mm3 Final   11/25/2021 12.40 (H) 3.40 - 10.80 10*3/mm3 Final   02/26/2021 11.42 (H) 3.40 - 10.80 10*3/mm3 Final   12/29/2020 8.42 4.5 - 11.0 10*3/uL Final   12/01/2020 6.77 4.5 - 11.0 10*3/uL Final   04/24/2017 7.59 4.00 - 10.00 10*3/mm3 Final   12/01/2014 6.20 4.50 - 10.70 K/Cumm Final     Hemoglobin   Date Value Ref Range Status   12/09/2021 11.0 (L) 12.0 - 15.9 g/dL Final   12/08/2021 12.2 12.0 - 15.9 g/dL Final   12/07/2021 12.2 12.0 - 15.9 g/dL Final   12/07/2021 13.2 12.0 - 15.9 g/dL Final   11/25/2021 11.5 (L) 12.0 - 15.9 g/dL Final   02/26/2021 12.2 12.0 - 15.9 g/dL Final   12/29/2020 10.0 (L) 12.0 - 16.0 g/dL Final   12/01/2020 11.7 (L) 12.0 - 16.0 g/dL Final   04/24/2017 12.4 11.5 - 14.9 g/dL Final   12/01/2014 12.5 11.9 - 15.5 g/dL Final     Platelets   Date Value Ref Range Status   12/09/2021 289 140 - 450 10*3/mm3 Final   12/08/2021 303 140 - 450 10*3/mm3 Final   12/07/2021 349 140 - 450 10*3/mm3 Final   12/07/2021 324 140 - 450 10*3/mm3 Final   11/25/2021 227 140 - 450 10*3/mm3 Final   02/26/2021 267 140 - 450 10*3/mm3 Final   12/29/2020 193 140 - 440 10*3/uL Final   12/01/2020 266 140 - 440 10*3/uL Final   04/24/2017 152 150 - 375 10*3/mm3 Final   12/01/2014 267 140 - 500 K/Cumm Final       ASSESSMENT/PLAN:  Patsy Tobin Room/bed info not found   Vaginal cuff thickening on outside CT scan.  Essentially normal exam here in the office.  I am not sure if her cuff tenderness or movement of the cuff causing tenderness may be more related to her diverticular history.  Nonetheless, we will get vaginal ultrasound here in the office and reviewed with her on the day of that scan.

## 2022-05-17 ENCOUNTER — HOSPITAL ENCOUNTER (OUTPATIENT)
Dept: MRI IMAGING | Facility: HOSPITAL | Age: 67
Discharge: HOME | End: 2022-05-17
Admitting: NURSE PRACTITIONER

## 2022-05-17 DIAGNOSIS — M54.50 ACUTE LOW BACK PAIN, UNSPECIFIED BACK PAIN LATERALITY, UNSPECIFIED WHETHER SCIATICA PRESENT: ICD-10-CM

## 2022-05-17 DIAGNOSIS — M45.9 RHEUMATOID ARTHRITIS INVOLVING VERTEBRA, UNSPECIFIED WHETHER RHEUMATOID FACTOR PRESENT: ICD-10-CM

## 2022-05-17 PROCEDURE — 72148 MRI LUMBAR SPINE W/O DYE: CPT

## 2022-05-20 ENCOUNTER — OFFICE VISIT (OUTPATIENT)
Dept: OBSTETRICS AND GYNECOLOGY | Facility: CLINIC | Age: 67
End: 2022-05-20

## 2022-05-20 VITALS
BODY MASS INDEX: 31.64 KG/M2 | SYSTOLIC BLOOD PRESSURE: 132 MMHG | HEIGHT: 67 IN | WEIGHT: 201.6 LBS | DIASTOLIC BLOOD PRESSURE: 70 MMHG

## 2022-05-20 DIAGNOSIS — R10.2 PELVIC PAIN: Primary | ICD-10-CM

## 2022-05-20 PROCEDURE — 99213 OFFICE O/P EST LOW 20 MIN: CPT | Performed by: OBSTETRICS & GYNECOLOGY

## 2022-05-20 RX ORDER — FUROSEMIDE 20 MG/1
20 TABLET ORAL DAILY
COMMUNITY
Start: 2022-05-17

## 2022-05-20 RX ORDER — POTASSIUM CHLORIDE 1500 MG/1
TABLET, FILM COATED, EXTENDED RELEASE ORAL
COMMUNITY
Start: 2022-05-17 | End: 2022-08-18 | Stop reason: SDUPTHER

## 2022-05-20 NOTE — PROGRESS NOTES
CC: Ultrasound follow-up.  Patient is following up from her recent exam and an outside CT scan that showed some thickening at the vaginal cuff.  She has had a previous hysterectomy years ago.  The CT also reported that there was some unusual appearance of the cervix, but this patient does not have a cervix.  This was all confirmed on her exam and is confirmed on today's ultrasound.  We do not see any abnormal mass or thickening of the vaginal cuff and no other abnormality seen today.  She will call if she develops any complications.  She does have a history of diverticulitis in the past.  Assessment: Recent abnormal thickening seen on outside CT.  This is not reproduced on her pelvic exam or today's ultrasound.  Plan: Follow as needed.  She develops any problems from this we certainly could discuss a laparoscopic procedure to assess but none is warranted after our evaluation in the office.  Patient reports that she is not currently experiencing any symptoms of urinary incontinence.    I spent 20 minutes caring for Patsy on this date of service. This time includes time spent by me in the following activities: preparing for the visit, reviewing tests, obtaining and/or reviewing a separately obtained history, counseling and educating the patient/family/caregiver and documenting information in the medical record

## 2022-05-26 DIAGNOSIS — K21.00 GASTROESOPHAGEAL REFLUX DISEASE WITH ESOPHAGITIS WITHOUT HEMORRHAGE: ICD-10-CM

## 2022-05-26 DIAGNOSIS — I80.3 THROMBOPHLEBITIS LEG: Primary | ICD-10-CM

## 2022-05-26 DIAGNOSIS — I27.82 CHRONIC PULMONARY EMBOLISM, UNSPECIFIED PULMONARY EMBOLISM TYPE, UNSPECIFIED WHETHER ACUTE COR PULMONALE PRESENT: ICD-10-CM

## 2022-06-08 ENCOUNTER — TRANSCRIBE ORDERS (OUTPATIENT)
Dept: ADMINISTRATIVE | Facility: HOSPITAL | Age: 67
End: 2022-06-08

## 2022-06-08 DIAGNOSIS — I82.409 DEEP PHLEBOTHROMBOSIS, ANTEPARTUM, WITH DELIVERY: Primary | ICD-10-CM

## 2022-06-08 DIAGNOSIS — O22.30 DEEP PHLEBOTHROMBOSIS, ANTEPARTUM, WITH DELIVERY: Primary | ICD-10-CM

## 2022-06-17 ENCOUNTER — APPOINTMENT (OUTPATIENT)
Dept: CARDIOLOGY | Facility: HOSPITAL | Age: 67
End: 2022-06-17

## 2022-06-28 RX ORDER — OMEPRAZOLE 40 MG/1
CAPSULE, DELAYED RELEASE ORAL
Qty: 90 CAPSULE | Refills: 0 | OUTPATIENT
Start: 2022-06-28

## 2022-06-29 ENCOUNTER — LAB (OUTPATIENT)
Dept: LAB | Facility: HOSPITAL | Age: 67
End: 2022-06-29

## 2022-06-29 ENCOUNTER — TRANSCRIBE ORDERS (OUTPATIENT)
Dept: ADMINISTRATIVE | Facility: HOSPITAL | Age: 67
End: 2022-06-29

## 2022-06-29 ENCOUNTER — HOSPITAL ENCOUNTER (OUTPATIENT)
Dept: CARDIOLOGY | Facility: HOSPITAL | Age: 67
Discharge: HOME | End: 2022-06-29
Admitting: NURSE PRACTITIONER

## 2022-06-29 DIAGNOSIS — R19.7 DIARRHEA, UNSPECIFIED TYPE: ICD-10-CM

## 2022-06-29 DIAGNOSIS — O22.30 DEEP PHLEBOTHROMBOSIS, ANTEPARTUM, WITH DELIVERY: ICD-10-CM

## 2022-06-29 DIAGNOSIS — I10 ESSENTIAL HYPERTENSION, MALIGNANT: ICD-10-CM

## 2022-06-29 DIAGNOSIS — R12 HEARTBURN: ICD-10-CM

## 2022-06-29 DIAGNOSIS — R14.0 BLOATING: ICD-10-CM

## 2022-06-29 DIAGNOSIS — E78.5 HYPERLIPIDEMIA, UNSPECIFIED HYPERLIPIDEMIA TYPE: ICD-10-CM

## 2022-06-29 DIAGNOSIS — I82.409 DEEP PHLEBOTHROMBOSIS, ANTEPARTUM, WITH DELIVERY: ICD-10-CM

## 2022-06-29 DIAGNOSIS — R14.0 BLOATING: Primary | ICD-10-CM

## 2022-06-29 LAB
25(OH)D3 SERPL-MCNC: 39 NG/ML (ref 30–100)
ALBUMIN SERPL-MCNC: 4.5 G/DL (ref 3.5–5.2)
ALBUMIN/GLOB SERPL: 1.9 G/DL
ALP SERPL-CCNC: 74 U/L (ref 39–117)
ALT SERPL W P-5'-P-CCNC: 14 U/L (ref 1–33)
ANION GAP SERPL CALCULATED.3IONS-SCNC: 13 MMOL/L (ref 5–15)
AST SERPL-CCNC: 17 U/L (ref 1–32)
BASOPHILS # BLD AUTO: 0.05 10*3/MM3 (ref 0–0.2)
BASOPHILS NFR BLD AUTO: 0.9 % (ref 0–1.5)
BH CV LOWER VASCULAR LEFT COMMON FEMORAL AUGMENT: NORMAL
BH CV LOWER VASCULAR LEFT COMMON FEMORAL COMPETENT: NORMAL
BH CV LOWER VASCULAR LEFT COMMON FEMORAL COMPRESS: NORMAL
BH CV LOWER VASCULAR LEFT COMMON FEMORAL PHASIC: NORMAL
BH CV LOWER VASCULAR LEFT COMMON FEMORAL SPONT: NORMAL
BH CV LOWER VASCULAR LEFT DISTAL FEMORAL COMPRESS: NORMAL
BH CV LOWER VASCULAR LEFT GASTRONEMIUS COMPRESS: NORMAL
BH CV LOWER VASCULAR LEFT GREATER SAPH AK COMPRESS: NORMAL
BH CV LOWER VASCULAR LEFT GREATER SAPH BK COMPRESS: NORMAL
BH CV LOWER VASCULAR LEFT MID FEMORAL AUGMENT: NORMAL
BH CV LOWER VASCULAR LEFT MID FEMORAL COMPETENT: NORMAL
BH CV LOWER VASCULAR LEFT MID FEMORAL COMPRESS: NORMAL
BH CV LOWER VASCULAR LEFT MID FEMORAL PHASIC: NORMAL
BH CV LOWER VASCULAR LEFT MID FEMORAL SPONT: NORMAL
BH CV LOWER VASCULAR LEFT PERONEAL COMPRESS: NORMAL
BH CV LOWER VASCULAR LEFT POPLITEAL AUGMENT: NORMAL
BH CV LOWER VASCULAR LEFT POPLITEAL COMPETENT: NORMAL
BH CV LOWER VASCULAR LEFT POPLITEAL COMPRESS: NORMAL
BH CV LOWER VASCULAR LEFT POPLITEAL PHASIC: NORMAL
BH CV LOWER VASCULAR LEFT POPLITEAL SPONT: NORMAL
BH CV LOWER VASCULAR LEFT POSTERIOR TIBIAL COMPRESS: NORMAL
BH CV LOWER VASCULAR LEFT PROXIMAL FEMORAL COMPRESS: NORMAL
BH CV LOWER VASCULAR RIGHT COMMON FEMORAL AUGMENT: NORMAL
BH CV LOWER VASCULAR RIGHT COMMON FEMORAL COMPETENT: NORMAL
BH CV LOWER VASCULAR RIGHT COMMON FEMORAL COMPRESS: NORMAL
BH CV LOWER VASCULAR RIGHT COMMON FEMORAL PHASIC: NORMAL
BH CV LOWER VASCULAR RIGHT COMMON FEMORAL SPONT: NORMAL
BILIRUB SERPL-MCNC: 0.2 MG/DL (ref 0–1.2)
BUN SERPL-MCNC: 37 MG/DL (ref 8–23)
BUN/CREAT SERPL: 18.4 (ref 7–25)
CALCIUM SPEC-SCNC: 9.3 MG/DL (ref 8.6–10.5)
CHLORIDE SERPL-SCNC: 110 MMOL/L (ref 98–107)
CHOLEST SERPL-MCNC: 203 MG/DL (ref 0–200)
CO2 SERPL-SCNC: 20 MMOL/L (ref 22–29)
CREAT SERPL-MCNC: 2.01 MG/DL (ref 0.57–1)
DEPRECATED RDW RBC AUTO: 44.6 FL (ref 37–54)
EGFRCR SERPLBLD CKD-EPI 2021: 26.8 ML/MIN/1.73
EOSINOPHIL # BLD AUTO: 0.15 10*3/MM3 (ref 0–0.4)
EOSINOPHIL NFR BLD AUTO: 2.6 % (ref 0.3–6.2)
ERYTHROCYTE [DISTWIDTH] IN BLOOD BY AUTOMATED COUNT: 14.1 % (ref 12.3–15.4)
GLOBULIN UR ELPH-MCNC: 2.4 GM/DL
GLUCOSE SERPL-MCNC: 92 MG/DL (ref 65–99)
HCT VFR BLD AUTO: 33.5 % (ref 34–46.6)
HDLC SERPL-MCNC: 83 MG/DL (ref 40–60)
HGB BLD-MCNC: 11.4 G/DL (ref 12–15.9)
IMM GRANULOCYTES # BLD AUTO: 0.02 10*3/MM3 (ref 0–0.05)
IMM GRANULOCYTES NFR BLD AUTO: 0.3 % (ref 0–0.5)
LDLC SERPL CALC-MCNC: 106 MG/DL (ref 0–100)
LDLC/HDLC SERPL: 1.25 {RATIO}
LYMPHOCYTES # BLD AUTO: 1.97 10*3/MM3 (ref 0.7–3.1)
LYMPHOCYTES NFR BLD AUTO: 34.2 % (ref 19.6–45.3)
MAXIMAL PREDICTED HEART RATE: 153 BPM
MCH RBC QN AUTO: 30.2 PG (ref 26.6–33)
MCHC RBC AUTO-ENTMCNC: 34 G/DL (ref 31.5–35.7)
MCV RBC AUTO: 88.9 FL (ref 79–97)
MONOCYTES # BLD AUTO: 1.11 10*3/MM3 (ref 0.1–0.9)
MONOCYTES NFR BLD AUTO: 19.3 % (ref 5–12)
NEUTROPHILS NFR BLD AUTO: 2.46 10*3/MM3 (ref 1.7–7)
NEUTROPHILS NFR BLD AUTO: 42.7 % (ref 42.7–76)
NRBC BLD AUTO-RTO: 0 /100 WBC (ref 0–0.2)
PLATELET # BLD AUTO: 254 10*3/MM3 (ref 140–450)
PMV BLD AUTO: 12.1 FL (ref 6–12)
POTASSIUM SERPL-SCNC: 5.1 MMOL/L (ref 3.5–5.2)
PROT SERPL-MCNC: 6.9 G/DL (ref 6–8.5)
RBC # BLD AUTO: 3.77 10*6/MM3 (ref 3.77–5.28)
SODIUM SERPL-SCNC: 143 MMOL/L (ref 136–145)
STRESS TARGET HR: 130 BPM
TRIGL SERPL-MCNC: 81 MG/DL (ref 0–150)
VLDLC SERPL-MCNC: 14 MG/DL (ref 5–40)
WBC NRBC COR # BLD: 5.76 10*3/MM3 (ref 3.4–10.8)

## 2022-06-29 PROCEDURE — 80053 COMPREHEN METABOLIC PANEL: CPT

## 2022-06-29 PROCEDURE — 93971 EXTREMITY STUDY: CPT | Performed by: SURGERY

## 2022-06-29 PROCEDURE — 85025 COMPLETE CBC W/AUTO DIFF WBC: CPT

## 2022-06-29 PROCEDURE — 82306 VITAMIN D 25 HYDROXY: CPT

## 2022-06-29 PROCEDURE — 80061 LIPID PANEL: CPT

## 2022-06-29 PROCEDURE — 93971 EXTREMITY STUDY: CPT

## 2022-06-29 PROCEDURE — 36415 COLL VENOUS BLD VENIPUNCTURE: CPT

## 2022-07-12 RX ORDER — OMEPRAZOLE 40 MG/1
40 CAPSULE, DELAYED RELEASE ORAL DAILY
Qty: 90 CAPSULE | Refills: 0 | Status: SHIPPED | OUTPATIENT
Start: 2022-07-12 | End: 2022-08-16 | Stop reason: SDUPTHER

## 2022-07-12 NOTE — TELEPHONE ENCOUNTER
Pt called and scheduled a yearly follow up appointment. She would also like to get a refill on her Omeprazole to get her up to her appointment. Please advise.

## 2022-07-13 ENCOUNTER — HOSPITAL ENCOUNTER (OUTPATIENT)
Dept: GENERAL RADIOLOGY | Facility: HOSPITAL | Age: 67
Discharge: HOME | End: 2022-07-13

## 2022-07-13 ENCOUNTER — TELEPHONE (OUTPATIENT)
Dept: PULMONOLOGY | Facility: CLINIC | Age: 67
End: 2022-07-13

## 2022-07-13 ENCOUNTER — HOSPITAL ENCOUNTER (OUTPATIENT)
Dept: NUCLEAR MEDICINE | Facility: HOSPITAL | Age: 67
Discharge: HOME | End: 2022-07-13

## 2022-07-13 ENCOUNTER — TRANSCRIBE ORDERS (OUTPATIENT)
Dept: ADMINISTRATIVE | Facility: HOSPITAL | Age: 67
End: 2022-07-13

## 2022-07-13 ENCOUNTER — LAB (OUTPATIENT)
Dept: LAB | Facility: HOSPITAL | Age: 67
End: 2022-07-13

## 2022-07-13 DIAGNOSIS — E78.5 HYPERLIPIDEMIA, UNSPECIFIED HYPERLIPIDEMIA TYPE: ICD-10-CM

## 2022-07-13 DIAGNOSIS — R14.0 BLOATING: ICD-10-CM

## 2022-07-13 DIAGNOSIS — I80.3 THROMBOPHLEBITIS LEG: ICD-10-CM

## 2022-07-13 DIAGNOSIS — R12 HEARTBURN: ICD-10-CM

## 2022-07-13 DIAGNOSIS — K21.00 GASTROESOPHAGEAL REFLUX DISEASE WITH ESOPHAGITIS WITHOUT HEMORRHAGE: ICD-10-CM

## 2022-07-13 DIAGNOSIS — I27.82 CHRONIC PULMONARY EMBOLISM, UNSPECIFIED PULMONARY EMBOLISM TYPE, UNSPECIFIED WHETHER ACUTE COR PULMONALE PRESENT: ICD-10-CM

## 2022-07-13 DIAGNOSIS — I10 ESSENTIAL HYPERTENSION, MALIGNANT: ICD-10-CM

## 2022-07-13 DIAGNOSIS — R19.7 DIARRHEA, UNSPECIFIED TYPE: ICD-10-CM

## 2022-07-13 DIAGNOSIS — I27.82 CHRONIC PULMONARY EMBOLISM, UNSPECIFIED PULMONARY EMBOLISM TYPE, UNSPECIFIED WHETHER ACUTE COR PULMONALE PRESENT: Primary | ICD-10-CM

## 2022-07-13 DIAGNOSIS — R14.0 BLOATING: Primary | ICD-10-CM

## 2022-07-13 LAB
25(OH)D3 SERPL-MCNC: 53.4 NG/ML (ref 30–100)
ALBUMIN SERPL-MCNC: 4 G/DL (ref 3.5–5.2)
ALBUMIN/GLOB SERPL: 1.6 G/DL
ALP SERPL-CCNC: 70 U/L (ref 39–117)
ALT SERPL W P-5'-P-CCNC: 12 U/L (ref 1–33)
ANION GAP SERPL CALCULATED.3IONS-SCNC: 9.1 MMOL/L (ref 5–15)
AST SERPL-CCNC: 13 U/L (ref 1–32)
BASOPHILS # BLD AUTO: 0.07 10*3/MM3 (ref 0–0.2)
BASOPHILS NFR BLD AUTO: 0.9 % (ref 0–1.5)
BILIRUB SERPL-MCNC: 0.3 MG/DL (ref 0–1.2)
BUN SERPL-MCNC: 38 MG/DL (ref 8–23)
BUN/CREAT SERPL: 18.4 (ref 7–25)
CALCIUM SPEC-SCNC: 9 MG/DL (ref 8.6–10.5)
CHLORIDE SERPL-SCNC: 110 MMOL/L (ref 98–107)
CHOLEST SERPL-MCNC: 192 MG/DL (ref 0–200)
CO2 SERPL-SCNC: 19.9 MMOL/L (ref 22–29)
CREAT SERPL-MCNC: 2.07 MG/DL (ref 0.57–1)
DEPRECATED RDW RBC AUTO: 46.9 FL (ref 37–54)
EGFRCR SERPLBLD CKD-EPI 2021: 25.8 ML/MIN/1.73
EOSINOPHIL # BLD AUTO: 0.21 10*3/MM3 (ref 0–0.4)
EOSINOPHIL NFR BLD AUTO: 2.7 % (ref 0.3–6.2)
ERYTHROCYTE [DISTWIDTH] IN BLOOD BY AUTOMATED COUNT: 14 % (ref 12.3–15.4)
GLOBULIN UR ELPH-MCNC: 2.5 GM/DL
GLUCOSE SERPL-MCNC: 120 MG/DL (ref 65–99)
HCT VFR BLD AUTO: 33.4 % (ref 34–46.6)
HDLC SERPL-MCNC: 62 MG/DL (ref 40–60)
HGB BLD-MCNC: 10.8 G/DL (ref 12–15.9)
IMM GRANULOCYTES # BLD AUTO: 0.01 10*3/MM3 (ref 0–0.05)
IMM GRANULOCYTES NFR BLD AUTO: 0.1 % (ref 0–0.5)
LDLC SERPL CALC-MCNC: 108 MG/DL (ref 0–100)
LDLC/HDLC SERPL: 1.69 {RATIO}
LYMPHOCYTES # BLD AUTO: 2.22 10*3/MM3 (ref 0.7–3.1)
LYMPHOCYTES NFR BLD AUTO: 28.6 % (ref 19.6–45.3)
MCH RBC QN AUTO: 29.8 PG (ref 26.6–33)
MCHC RBC AUTO-ENTMCNC: 32.3 G/DL (ref 31.5–35.7)
MCV RBC AUTO: 92.3 FL (ref 79–97)
MONOCYTES # BLD AUTO: 1.03 10*3/MM3 (ref 0.1–0.9)
MONOCYTES NFR BLD AUTO: 13.3 % (ref 5–12)
NEUTROPHILS NFR BLD AUTO: 4.21 10*3/MM3 (ref 1.7–7)
NEUTROPHILS NFR BLD AUTO: 54.4 % (ref 42.7–76)
NRBC BLD AUTO-RTO: 0 /100 WBC (ref 0–0.2)
PLATELET # BLD AUTO: 210 10*3/MM3 (ref 140–450)
PMV BLD AUTO: 12.2 FL (ref 6–12)
POTASSIUM SERPL-SCNC: 5 MMOL/L (ref 3.5–5.2)
PROT SERPL-MCNC: 6.5 G/DL (ref 6–8.5)
RBC # BLD AUTO: 3.62 10*6/MM3 (ref 3.77–5.28)
SODIUM SERPL-SCNC: 139 MMOL/L (ref 136–145)
TRIGL SERPL-MCNC: 126 MG/DL (ref 0–150)
VLDLC SERPL-MCNC: 22 MG/DL (ref 5–40)
WBC NRBC COR # BLD: 7.75 10*3/MM3 (ref 3.4–10.8)

## 2022-07-13 PROCEDURE — 80061 LIPID PANEL: CPT

## 2022-07-13 PROCEDURE — 80053 COMPREHEN METABOLIC PANEL: CPT

## 2022-07-13 PROCEDURE — A9540 TC99M MAA: HCPCS | Performed by: NURSE PRACTITIONER

## 2022-07-13 PROCEDURE — 78580 LUNG PERFUSION IMAGING: CPT

## 2022-07-13 PROCEDURE — 71046 X-RAY EXAM CHEST 2 VIEWS: CPT

## 2022-07-13 PROCEDURE — 36415 COLL VENOUS BLD VENIPUNCTURE: CPT

## 2022-07-13 PROCEDURE — 85025 COMPLETE CBC W/AUTO DIFF WBC: CPT

## 2022-07-13 PROCEDURE — 82306 VITAMIN D 25 HYDROXY: CPT

## 2022-07-13 PROCEDURE — 0 TECHNETIUM ALBUMIN AGGREGATED: Performed by: NURSE PRACTITIONER

## 2022-07-13 RX ADMIN — KIT FOR THE PREPARATION OF TECHNETIUM TC 99M ALBUMIN AGGREGATED 1 DOSE: 2.5 INJECTION, POWDER, FOR SOLUTION INTRAVENOUS at 13:27

## 2022-07-13 NOTE — TELEPHONE ENCOUNTER
Nuclear medicine called over in regards to patient currently getting a lung perfusion done. The nuclear tech said it is protocol that the patient has a chest xray 24 hr post perfusion. Nuclear tech is asking for someone to please put in an order as the patient is currently in radiology department. Thank you.

## 2022-07-26 ENCOUNTER — PROCEDURE VISIT (OUTPATIENT)
Dept: OBSTETRICS AND GYNECOLOGY | Facility: CLINIC | Age: 67
End: 2022-07-26

## 2022-07-26 ENCOUNTER — APPOINTMENT (OUTPATIENT)
Dept: WOMENS IMAGING | Facility: HOSPITAL | Age: 67
End: 2022-07-26

## 2022-07-26 DIAGNOSIS — Z78.0 POST-MENOPAUSAL: Primary | ICD-10-CM

## 2022-07-26 DIAGNOSIS — Z12.31 VISIT FOR SCREENING MAMMOGRAM: Primary | ICD-10-CM

## 2022-07-26 PROCEDURE — 77067 SCR MAMMO BI INCL CAD: CPT | Performed by: RADIOLOGY

## 2022-07-26 PROCEDURE — 77067 SCR MAMMO BI INCL CAD: CPT | Performed by: OBSTETRICS & GYNECOLOGY

## 2022-07-26 PROCEDURE — 77063 BREAST TOMOSYNTHESIS BI: CPT | Performed by: RADIOLOGY

## 2022-07-26 PROCEDURE — 77063 BREAST TOMOSYNTHESIS BI: CPT | Performed by: OBSTETRICS & GYNECOLOGY

## 2022-07-28 ENCOUNTER — TELEPHONE (OUTPATIENT)
Dept: OBSTETRICS AND GYNECOLOGY | Facility: CLINIC | Age: 67
End: 2022-07-28

## 2022-07-28 ENCOUNTER — OFFICE VISIT (OUTPATIENT)
Dept: PULMONOLOGY | Facility: CLINIC | Age: 67
End: 2022-07-28

## 2022-07-28 VITALS
OXYGEN SATURATION: 98 % | HEART RATE: 60 BPM | HEIGHT: 67 IN | RESPIRATION RATE: 16 BRPM | DIASTOLIC BLOOD PRESSURE: 57 MMHG | TEMPERATURE: 97.5 F | SYSTOLIC BLOOD PRESSURE: 121 MMHG | BODY MASS INDEX: 30.13 KG/M2 | WEIGHT: 192 LBS

## 2022-07-28 DIAGNOSIS — I48.91 ATRIAL FIBRILLATION, UNSPECIFIED TYPE: ICD-10-CM

## 2022-07-28 DIAGNOSIS — I27.82 CHRONIC PULMONARY EMBOLISM, UNSPECIFIED PULMONARY EMBOLISM TYPE, UNSPECIFIED WHETHER ACUTE COR PULMONALE PRESENT: Primary | ICD-10-CM

## 2022-07-28 DIAGNOSIS — J96.01 ACUTE RESPIRATORY FAILURE WITH HYPOXIA: ICD-10-CM

## 2022-07-28 PROCEDURE — 99213 OFFICE O/P EST LOW 20 MIN: CPT | Performed by: NURSE PRACTITIONER

## 2022-07-28 RX ORDER — METHOCARBAMOL 500 MG/1
TABLET, FILM COATED ORAL
COMMUNITY
Start: 2022-07-08 | End: 2022-08-16

## 2022-07-28 RX ORDER — OMEGA-3S/DHA/EPA/FISH OIL 1000-1400
CAPSULE,DELAYED RELEASE (ENTERIC COATED) ORAL
COMMUNITY

## 2022-07-28 RX ORDER — POTASSIUM CHLORIDE 20 MEQ/1
20 TABLET, EXTENDED RELEASE ORAL DAILY
COMMUNITY
Start: 2022-05-17 | End: 2023-01-31

## 2022-07-28 NOTE — PROGRESS NOTES
Primary Care Provider  Annetta Austin APRN   Referring Provider  No ref. provider found    Patient Complaint  Results      SUBJECTIVE    History of Presenting Illness  Patsy Tobin is a pleasant 66 y.o. female who presents to [facility] for follow-up appointment on her nuclear med lung scan.  Impression of the VQ scan showed low probability of acute pulmonary embolic disease.  Patient continues to be on her Eliquis at this time.  Patient states she feels so much better than she did previously she feels like she has recovered.    Denies dyspnea, coughing, wheezing, headaches, chest pain, weight loss or hemoptysis. Denies fevers, chills and night sweats. Patsy Tobin is able to perform ADLs without difficulties and denies any swollen glands/lymph nodes in the head or neck.    I have personally reviewed the review of systems, past family, social, medical and surgical histories; and agree with their findings.    Review of Systems   Constitutional: Negative.  Negative for chills, fatigue and fever.   HENT: Negative.    Eyes: Negative.    Respiratory: Negative.  Negative for cough, shortness of breath, wheezing and stridor.    Cardiovascular: Negative.  Negative for chest pain, palpitations and leg swelling.   Musculoskeletal: Negative.         Family History   Problem Relation Age of Onset   • Heart failure Mother    • Hypertension Mother    • Cancer Father         kidney   • Colon polyps Brother         Brother   • Diabetes Maternal Grandmother    • Colon cancer Neg Hx         Social History     Socioeconomic History   • Marital status:      Spouse name: Jose   • Years of education: High school   Tobacco Use   • Smoking status: Former Smoker     Packs/day: 1.00     Years: 22.00     Pack years: 22.00     Types: Cigarettes     Quit date: 1988     Years since quittin.7   • Smokeless tobacco: Never Used   Vaping Use   • Vaping Use: Never  used   Substance and Sexual Activity   • Alcohol use: Yes     Comment: Social drinker, do not drink everyday   • Drug use: No   • Sexual activity: Yes     Partners: Male     Birth control/protection: Surgical     Comment: Hysterectomy in 1988        Past Medical History:   Diagnosis Date   • Abdominal pain of multiple sites    • Allergic rhinitis    • AMEYA positive    • Blurred vision, bilateral    • Deep vein thrombosis (HCC)    • Depression    • Diarrhea    • Esophagitis 2016   • Fatigue    • Gastric ulcer    • GERD (gastroesophageal reflux disease)    • Headache    • High blood pressure    • Hyperlipidemia    • Hypertension    • Leg cramps    • Leg wound, right    • Mitral valve prolapse    • Multiple joint pain    • Overweight    • Peptic ulceration    • PONV (postoperative nausea and vomiting)    • Pulmonary embolism (HCC) 11/25/2021   • RA (rheumatoid arthritis) (HCC)    • Superficial phlebitis     in left ankle    • Tattoos     permanent makeup         Immunization History   Administered Date(s) Administered   • COVID-19 (PFIZER) PURPLE CAP 03/02/2021, 03/23/2021, 01/10/2022   • Flu Vaccine Split Quad 09/18/2015, 10/14/2016   • FluLaval/Fluzone >6mos 10/15/2019   • Fluad Quad 65+ 10/01/2020, 11/01/2021, 11/18/2021   • Hepatitis A 05/08/2018, 12/28/2018   • Influenza, Unspecified 01/01/2012, 10/15/2019   • Pneumococcal Conjugate 13-Valent (PCV13) 03/24/2020   • Pneumococcal Polysaccharide (PPSV23) 02/19/2019   • flucelvax quad pfs =>4 YRS 10/30/2017, 10/16/2018       Allergies   Allergen Reactions   • Celebrex [Celecoxib] Other (See Comments)     Passed Out    • Arava [Leflunomide] Other (See Comments)     Altered Glucose Levels   • Duloxetine Hallucinations   • Naproxen GI Intolerance     All NSAIDS    • Nsaids GI Intolerance     Other reaction(s): Stomach upset     • Plaquenil [Hydroxychloroquine Sulfate] Mental Status Change          Current Outpatient Medications:   •  alosetron (LOTRONEX) 0.5 MG tablet,  "Take 1 tablet by mouth 2 (Two) Times a Day As Needed (diarrhea)., Disp: 60 tablet, Rfl: 5  •  apixaban (ELIQUIS) 5 MG tablet tablet, Take 1 tablet twice a day., Disp: 60 tablet, Rfl: 11  •  aspirin 81 MG chewable tablet, Chew 81 mg Daily., Disp: , Rfl:   •  cetirizine (zyrTEC) 10 MG tablet, TAKE 1 TABLET BY MOUTH IN THE MORNING AS NEEDED, Disp: , Rfl:   •  etanercept (ENBREL) 50 MG/ML solution prefilled syringe injection, Inject 50 mg under the skin 1 (One) Time Per Week., Disp: , Rfl:   •  Fiber Adult Gummies 2 g chewable tablet, Chew., Disp: , Rfl:   •  fluticasone (FLONASE) 50 MCG/ACT nasal spray, 2 sprays into the nostril(s) as directed by provider Daily., Disp: , Rfl:   •  furosemide (LASIX) 20 MG tablet, 20 mg Daily., Disp: , Rfl:   •  hyoscyamine (LEVSIN) 0.125 MG SL tablet, Take 1 tablet by mouth Every 6 (Six) Hours As Needed (esophageal spasm)., Disp: 60 tablet, Rfl: 2  •  lisinopril (PRINIVIL,ZESTRIL) 10 MG tablet, Take 10 mg by mouth Daily., Disp: , Rfl:   •  lovastatin (MEVACOR) 20 MG tablet, Take 20 mg by mouth Every Night., Disp: , Rfl:   •  metoprolol succinate XL (TOPROL-XL) 25 MG 24 hr tablet, Take 1 tablet by mouth Daily., Disp: 90 tablet, Rfl: 3  •  HYDROcodone-acetaminophen (NORCO) 5-325 MG per tablet, Take 1 tablet by mouth Every 8 (Eight) Hours As Needed. for pain, Disp: , Rfl:   •  omeprazole (priLOSEC) 40 MG capsule, Take 1 capsule by mouth Daily., Disp: 90 capsule, Rfl: 3  •  potassium chloride (K-DUR,KLOR-CON) 20 MEQ CR tablet, 20 mEq Daily., Disp: , Rfl:        OBJECTIVE    Vital Signs   /57 (BP Location: Left arm, Patient Position: Sitting, Cuff Size: Large Adult)   Pulse 60   Temp 97.5 °F (36.4 °C) (Tympanic)   Resp 16   Ht 170.2 cm (67\")   Wt 87.1 kg (192 lb)   SpO2 98% Comment: Room air  BMI 30.07 kg/m²     Physical Exam  Vitals reviewed.   Constitutional:       Appearance: Normal appearance. She is obese.   HENT:      Head: Normocephalic and atraumatic.      Nose: Nose " normal.      Mouth/Throat:      Mouth: Mucous membranes are moist.      Pharynx: Oropharynx is clear.   Eyes:      Extraocular Movements: Extraocular movements intact.      Conjunctiva/sclera: Conjunctivae normal.      Pupils: Pupils are equal, round, and reactive to light.   Cardiovascular:      Rate and Rhythm: Normal rate and regular rhythm.      Pulses: Normal pulses.      Heart sounds: Normal heart sounds.   Pulmonary:      Effort: Pulmonary effort is normal.      Breath sounds: Normal breath sounds.   Abdominal:      General: Bowel sounds are normal.   Musculoskeletal:         General: Normal range of motion.      Cervical back: Normal range of motion and neck supple.   Skin:     General: Skin is warm and dry.   Neurological:      General: No focal deficit present.      Mental Status: She is alert and oriented to person, place, and time.   Psychiatric:         Mood and Affect: Mood normal.         Behavior: Behavior normal.          Results Review  I have personally reviewed the VQ scan and  Echo report for 07/13/2022 as follows:    NM Lung Scan Perfusion Particulate [[id number]] (Order [id number])  Order  Status: Final result             Appointment Information      Display Notes    V-AS                   PACS Images     Radiology Images    Study Result    Narrative & Impression   PROCEDURE:  NM LUNG SCAN PERFUSION PARTICULATE     COMPARISON: [facility], CR, XR CHEST 2 VW, 7/13/2022, 13:48.     TECHNIQUE:    After obtaining the patient's consent, Tc-99m MAA was injected intravenously and images   subsequently obtained.       RADIONUCLIDE:         4.11 mCi     Tc99m MAA - I.V.     FINDINGS:          PERFUSION DEFECTS:             None.    OTHER:             Negative.       IMPRESSION:               Low probability of acute pulmonary embolic disease.            CHUCK PURCELL MD         Electronically Signed and Approved By: CHUCK PURCELL MD on 7/13/2022 at 15:58                    Interpretation  Summary    · Calculated left ventricular EF = 59% Estimated left ventricular EF was in agreement with the calculated left ventricular EF.  · Left ventricular diastolic function is consistent with (grade I) impaired relaxation.  · The aortic root measures 3.2 cm.  · Estimated right ventricular systolic pressure from tricuspid regurgitation is normal (<35 mmHg).        ASSESSMENT & PLAN    Patient Active Problem List   Diagnosis   • Thrombophlebitis leg   • Rectal bleeding   • Anemia   • Diarrhea   • Gastroesophageal reflux disease   • Chronic pulmonary embolism (HCC)   • Depressive disorder   • Gastric reflux   • Hyperlipidemia   • Hypertension   • Mitral valve prolapse   • Phlebitis   • Pleurisy with effusion, with mention of bacterial cause other than tuberculosis   • Pneumonia   • Rheumatoid arthritis (HCC)   • Preoperative examination, unspecified   • Right bundle-branch block   • Serum creatinine raised   • Overweight       Diagnoses and all orders for this visit:    1. Chronic pulmonary embolism, unspecified pulmonary embolism type, unspecified whether acute cor pulmonale present (HCC) (Primary)    2. Acute respiratory failure with hypoxia (HCC)    3. Atrial fibrillation, unspecified type (HCC)           Plan:  Patient will continue with Eliquis at least until January. Patient to followup in 2 months. Patient to continue with albuterol as needed an duo neb as needed. Patient is planning a trip to [place] next month and discussed with patient frequent stops every two hours, including exercises for stretching and deep breathing. Patient verbalizes understanding.     Smoking status:former  Vaccination status:up to date  Medications personally reviewed    Follow Up  Return in about 4 months (around 11/28/2022).    Patient was given instructions and counseling regarding her condition or for health maintenance advice. Please see specific information pulled into the AVS if appropriate.

## 2022-08-16 ENCOUNTER — OFFICE VISIT (OUTPATIENT)
Dept: GASTROENTEROLOGY | Facility: CLINIC | Age: 67
End: 2022-08-16

## 2022-08-16 VITALS
TEMPERATURE: 96.9 F | SYSTOLIC BLOOD PRESSURE: 142 MMHG | HEART RATE: 67 BPM | HEIGHT: 67 IN | WEIGHT: 195.3 LBS | DIASTOLIC BLOOD PRESSURE: 90 MMHG | OXYGEN SATURATION: 100 % | BODY MASS INDEX: 30.65 KG/M2

## 2022-08-16 DIAGNOSIS — R13.19 ESOPHAGEAL DYSPHAGIA: Primary | ICD-10-CM

## 2022-08-16 DIAGNOSIS — K58.0 IRRITABLE BOWEL SYNDROME WITH DIARRHEA: ICD-10-CM

## 2022-08-16 DIAGNOSIS — D64.9 ANEMIA, UNSPECIFIED TYPE: ICD-10-CM

## 2022-08-16 DIAGNOSIS — R79.89 SERUM CREATININE RAISED: ICD-10-CM

## 2022-08-16 PROCEDURE — 99214 OFFICE O/P EST MOD 30 MIN: CPT | Performed by: NURSE PRACTITIONER

## 2022-08-16 RX ORDER — OMEPRAZOLE 40 MG/1
40 CAPSULE, DELAYED RELEASE ORAL DAILY
Qty: 90 CAPSULE | Refills: 3 | Status: SHIPPED | OUTPATIENT
Start: 2022-08-16

## 2022-08-16 RX ORDER — HYDROCODONE BITARTRATE AND ACETAMINOPHEN 5; 325 MG/1; MG/1
1 TABLET ORAL EVERY 8 HOURS PRN
Status: ON HOLD | COMMUNITY
Start: 2022-07-18 | End: 2022-11-21

## 2022-08-16 NOTE — PATIENT INSTRUCTIONS
Check lab work today.    Schedule esophagram for further evaluation of dysphagia.    For GERD, continue omeprazole daily as prescribed.    For IBS-D, may continue to use Lotronex as needed.

## 2022-08-16 NOTE — PROGRESS NOTES
Chief Complaint   Patient presents with   • GI Problem         History of Present Illness  Patient is a 66-year-old female who presents today for follow-up.  She has a history of GERD, dysphagia, and IBS/D. To evaluate dysphagia, patient has previously undergone esophageal manometry which showed incomplete lower esophageal sphincter relaxation, impaired contraction propagation with impaired peristalsis and bolus transfer of liquids, and large peristaltic breaks.  Her most recent EGD and colonoscopy were March 2021.    Patient presents today for follow-up.  She reports that since her last office visit, she has been diagnosed with blood clots in her legs and lung.  She is on Eliquis for this.  She also has been found to have chronic Kidney disease with elevated creatinine.  On Recent lab work, she was also found to be anemic. Most recent CBC showed hemoglobin of 10.8.  She was not microcytic.     Patient reports she has had some worsening of dysphagia since her last office visit.  Can occur with both liquids and solids.  She denies any regurgitation.    She continues on omeprazole for GERD and reports good control of heartburn and reflux symptoms.  She did have 1 episode of spontaneous vomiting but otherwise denies any nausea or vomiting.  Denies any abdominal pain.    She continues to have intermittent issues with diarrhea.  She uses Lotronex on an as-needed basis with good symptom control.  She reports as long as she watches her diet, generally her bowels are moving regularly.  She had 1 episode where she found bright red blood in her underwear but otherwise denies any blood in the stool or dark tarry stools.         Result Review :       Office Visit with Latonia Carbone APRN (03/31/2021)   COLONOSCOPY (03/12/2021 07:16)   UPPER GI ENDOSCOPY (03/12/2021 07:17)   Tissue Pathology Exam (03/12/2021 07:32)   Office Visit with Latonia Carbone APRN (06/03/2020)   Office Visit with Pauly Loob APRN (02/26/2021)  "  CBC & Differential (07/13/2022 12:44)  Comprehensive Metabolic Panel (07/13/2022 12:44)      Vital Signs:   /90   Pulse 67   Temp 96.9 °F (36.1 °C)   Ht 170.2 cm (67\")   Wt 88.6 kg (195 lb 4.8 oz)   SpO2 100%   BMI 30.59 kg/m²     Body mass index is 30.59 kg/m².     Physical Exam  Vitals reviewed.   Constitutional:       General: She is not in acute distress.     Appearance: She is well-developed.   HENT:      Head: Normocephalic and atraumatic.   Pulmonary:      Effort: Pulmonary effort is normal. No respiratory distress.   Abdominal:      General: Abdomen is flat. Bowel sounds are normal. There is no distension.      Palpations: Abdomen is soft.      Tenderness: There is no abdominal tenderness.   Skin:     General: Skin is dry.      Coloration: Skin is not pale.   Neurological:      Mental Status: She is alert and oriented to person, place, and time.   Psychiatric:         Thought Content: Thought content normal.           Assessment and Plan    Diagnoses and all orders for this visit:    1. Esophageal dysphagia (Primary)  -     FL Esophagram Complete Double-Contrast; Future    2. Anemia, unspecified type  -     CBC & Differential  -     Iron Profile  -     Ferritin    3. Serum creatinine raised  -     Ambulatory Referral to Nephrology    4. Irritable bowel syndrome with diarrhea    Other orders  -     omeprazole (priLOSEC) 40 MG capsule; Take 1 capsule by mouth Daily.  Dispense: 90 capsule; Refill: 3         Discussion  Patient presents today for follow-up with concerns about worsening dysphagia.  We will schedule esophagram for further evaluation.  We will provide further recommendations dependent upon results, regarding ineffective peristalsis seen on prior manometry, may consider trial of erythromycin for prokinesis.    Regarding anemia, suspect this is likely secondary to chronic kidney disease.  We will recheck CBC and iron studies today.  If iron deficiency is present, will obtain fecal " occult blood test.  Will refer to nephrology for further evaluation and management of chronic kidney disease.          Follow Up   Return in about 1 year (around 8/16/2023), or if symptoms worsen or fail to improve.    Patient Instructions   Check lab work today.    Schedule esophagram for further evaluation of dysphagia.    For GERD, continue omeprazole daily as prescribed.    For IBS-D, may continue to use Lotronex as needed.

## 2022-08-17 LAB
BASOPHILS # BLD AUTO: 0.1 X10E3/UL (ref 0–0.2)
BASOPHILS NFR BLD AUTO: 1 %
EOSINOPHIL # BLD AUTO: 0.3 X10E3/UL (ref 0–0.4)
EOSINOPHIL NFR BLD AUTO: 4 %
ERYTHROCYTE [DISTWIDTH] IN BLOOD BY AUTOMATED COUNT: 14 % (ref 11.7–15.4)
FERRITIN SERPL-MCNC: 33 NG/ML (ref 15–150)
HCT VFR BLD AUTO: 33.3 % (ref 34–46.6)
HGB BLD-MCNC: 11 G/DL (ref 11.1–15.9)
IMM GRANULOCYTES # BLD AUTO: 0 X10E3/UL (ref 0–0.1)
IMM GRANULOCYTES NFR BLD AUTO: 0 %
IRON SATN MFR SERPL: 25 % (ref 15–55)
IRON SERPL-MCNC: 85 UG/DL (ref 27–139)
LYMPHOCYTES # BLD AUTO: 2 X10E3/UL (ref 0.7–3.1)
LYMPHOCYTES NFR BLD AUTO: 31 %
MCH RBC QN AUTO: 30.4 PG (ref 26.6–33)
MCHC RBC AUTO-ENTMCNC: 33 G/DL (ref 31.5–35.7)
MCV RBC AUTO: 92 FL (ref 79–97)
MONOCYTES # BLD AUTO: 0.8 X10E3/UL (ref 0.1–0.9)
MONOCYTES NFR BLD AUTO: 12 %
NEUTROPHILS # BLD AUTO: 3.3 X10E3/UL (ref 1.4–7)
NEUTROPHILS NFR BLD AUTO: 52 %
PLATELET # BLD AUTO: 237 X10E3/UL (ref 150–450)
RBC # BLD AUTO: 3.62 X10E6/UL (ref 3.77–5.28)
TIBC SERPL-MCNC: 344 UG/DL (ref 250–450)
UIBC SERPL-MCNC: 259 UG/DL (ref 118–369)
WBC # BLD AUTO: 6.5 X10E3/UL (ref 3.4–10.8)

## 2022-08-18 ENCOUNTER — OFFICE VISIT (OUTPATIENT)
Dept: CARDIOLOGY | Facility: CLINIC | Age: 67
End: 2022-08-18

## 2022-08-18 VITALS
BODY MASS INDEX: 30.29 KG/M2 | WEIGHT: 193 LBS | HEART RATE: 60 BPM | SYSTOLIC BLOOD PRESSURE: 124 MMHG | HEIGHT: 67 IN | DIASTOLIC BLOOD PRESSURE: 76 MMHG

## 2022-08-18 DIAGNOSIS — Z86.711 HISTORY OF PULMONARY EMBOLISM: Primary | ICD-10-CM

## 2022-08-18 DIAGNOSIS — I10 ESSENTIAL HYPERTENSION: ICD-10-CM

## 2022-08-18 DIAGNOSIS — E78.2 MIXED HYPERLIPIDEMIA: ICD-10-CM

## 2022-08-18 PROCEDURE — 99213 OFFICE O/P EST LOW 20 MIN: CPT | Performed by: INTERNAL MEDICINE

## 2022-08-18 NOTE — PROGRESS NOTES
Chief Complaint  Shortness of Breath, Pulmonary htn, Hyperlipidemia, and Hypertension    Subjective      Patient returns to clinic to follow-up on echocardiogram which was done for history of PE and pulmonary hypertension.  She had bilateral pulmonary emboli about 8 months ago.  At that time, her echocardiogram demonstrated significantly elevated PA systolic pressure exceeding 55 mmHg.  She has been on anticoagulation with Eliquis.  She has been gradually doing better and her shortness of breath has completely resolved.  She remains physically active without extraordinary limitations.  She has no chest pain, palpitations, presyncope or syncope.      Past Medical History:   Diagnosis Date   • Abdominal pain of multiple sites    • Allergic rhinitis    • AMEYA positive    • Blurred vision, bilateral    • Deep vein thrombosis (HCC)    • Depression    • Diarrhea    • Esophagitis 2016   • Fatigue    • Gastric ulcer    • GERD (gastroesophageal reflux disease)    • Headache    • High blood pressure    • Hyperlipidemia    • Hypertension    • Leg cramps    • Leg wound, right    • Mitral valve prolapse    • Multiple joint pain    • Overweight    • Peptic ulceration    • PONV (postoperative nausea and vomiting)    • Pulmonary embolism (HCC) 11/25/2021   • RA (rheumatoid arthritis) (HCC)    • Superficial phlebitis     in left ankle    • Tattoos     permanent makeup          Current Outpatient Medications:   •  alosetron (LOTRONEX) 0.5 MG tablet, Take 1 tablet by mouth 2 (Two) Times a Day As Needed (diarrhea)., Disp: 60 tablet, Rfl: 5  •  apixaban (ELIQUIS) 5 MG tablet tablet, Take 1 tablet twice a day., Disp: 60 tablet, Rfl: 11  •  aspirin 81 MG chewable tablet, Chew 81 mg Daily., Disp: , Rfl:   •  cetirizine (zyrTEC) 10 MG tablet, TAKE 1 TABLET BY MOUTH IN THE MORNING AS NEEDED, Disp: , Rfl:   •  etanercept (ENBREL) 50 MG/ML solution prefilled syringe injection, Inject 50 mg under the skin 1 (One) Time Per Week., Disp: , Rfl:   •   Fiber Adult Gummies 2 g chewable tablet, Chew., Disp: , Rfl:   •  fluticasone (FLONASE) 50 MCG/ACT nasal spray, 2 sprays into the nostril(s) as directed by provider Daily., Disp: , Rfl:   •  furosemide (LASIX) 20 MG tablet, 20 mg Daily., Disp: , Rfl:   •  HYDROcodone-acetaminophen (NORCO) 5-325 MG per tablet, Take 1 tablet by mouth Every 8 (Eight) Hours As Needed. for pain, Disp: , Rfl:   •  hyoscyamine (LEVSIN) 0.125 MG SL tablet, Take 1 tablet by mouth Every 6 (Six) Hours As Needed (esophageal spasm)., Disp: 60 tablet, Rfl: 2  •  lisinopril (PRINIVIL,ZESTRIL) 10 MG tablet, Take 10 mg by mouth Daily., Disp: , Rfl:   •  lovastatin (MEVACOR) 20 MG tablet, Take 20 mg by mouth Every Night., Disp: , Rfl:   •  metoprolol succinate XL (TOPROL-XL) 25 MG 24 hr tablet, Take 1 tablet by mouth Daily., Disp: 90 tablet, Rfl: 3  •  omeprazole (priLOSEC) 40 MG capsule, Take 1 capsule by mouth Daily., Disp: 90 capsule, Rfl: 3  •  potassium chloride (K-DUR,KLOR-CON) 20 MEQ CR tablet, 20 mEq Daily., Disp: , Rfl:     Medications Discontinued During This Encounter   Medication Reason   • potassium chloride ER (K-TAB) 20 MEQ tablet controlled-release ER tablet Duplicate order     Allergies   Allergen Reactions   • Celebrex [Celecoxib] Other (See Comments)     Passed Out    • Arava [Leflunomide] Other (See Comments)     Altered Glucose Levels   • Duloxetine Hallucinations   • Naproxen GI Intolerance     All NSAIDS    • Nsaids GI Intolerance     Other reaction(s): Stomach upset     • Plaquenil [Hydroxychloroquine Sulfate] Mental Status Change        Social History     Tobacco Use   • Smoking status: Former Smoker     Packs/day: 1.00     Years: 22.00     Pack years: 22.00     Types: Cigarettes     Quit date: 1988     Years since quittin.6   • Smokeless tobacco: Never Used   Vaping Use   • Vaping Use: Never used   Substance Use Topics   • Alcohol use: Yes     Comment: Social drinker, do not drink everyday   • Drug use: No  "      Family History   Problem Relation Age of Onset   • Heart failure Mother    • Hypertension Mother    • Cancer Father         kidney   • Colon polyps Brother         Brother   • Diabetes Maternal Grandmother    • Colon cancer Neg Hx         Objective     /76   Pulse 60   Ht 170.2 cm (67\")   Wt 87.5 kg (193 lb)   BMI 30.23 kg/m²       Physical Exam    General Appearance:   · no acute distress  · Alert and oriented x3  HENT:   · lips not cyanotic  · Atraumatic  Neck:  · No jvd   · supple  Respiratory:  · no respiratory distress  · normal breath sounds  · no rales  Cardiovascular:  · no S3, no S4   · no murmur  · no rub  Extremities  · No cyanosis  · lower extremity edema: none    Skin:   · warm, dry  · No rashes      Result Review :     proBNP   Date Value Ref Range Status   03/14/2022 308.0 0.0 - 900.0 pg/mL Final     CMP    CMP 2/8/22 6/29/22 7/13/22   Glucose  92 120 (A)   BUN  37 (A) 38 (A)   Creatinine 1.60 2.01 (A) 2.07 (A)   Sodium  143 139   Potassium  5.1 5.0   Chloride  110 (A) 110 (A)   Calcium  9.3 9.0   Albumin  4.50 4.00   Total Bilirubin  0.2 0.3   Alkaline Phosphatase  74 70   AST (SGOT)  17 13   ALT (SGPT)  14 12   (A) Abnormal value       Comments are available for some flowsheets but are not being displayed.           CBC w/diff    CBC w/Diff 6/29/22 7/13/22 8/16/22   WBC 5.76 7.75 6.5   RBC 3.77 3.62 (A) 3.62 (A)   Hemoglobin 11.4 (A) 10.8 (A) 11.0 (A)   Hematocrit 33.5 (A) 33.4 (A) 33.3 (A)   MCV 88.9 92.3 92   MCH 30.2 29.8 30.4   MCHC 34.0 32.3 33.0   RDW 14.1 14.0 14.0   Platelets 254 210 237   Neutrophil Rel % 42.7 54.4 52   Immature Granulocyte Rel % 0.3 0.1    Lymphocyte Rel % 34.2 28.6 31   Monocyte Rel % 19.3 (A) 13.3 (A) 12   Eosinophil Rel % 2.6 2.7 4   Basophil Rel % 0.9 0.9 1   (A) Abnormal value             No results found for: TSH   Lab Results   Component Value Date    FREET4 0.92 02/22/2022      No results found for: DDIMERQUANT  Magnesium   Date Value Ref Range " Status   12/09/2021 1.6 1.6 - 2.4 mg/dL Final      No results found for: DIGOXIN   Lab Results   Component Value Date    TROPONINT <0.010 03/14/2022           Lipid Panel    Lipid Panel 12/8/21 6/29/22 7/13/22   Total Cholesterol 225 (A) 203 (A) 192   Triglycerides 172 (A) 81 126   HDL Cholesterol 72 (A) 83 (A) 62 (A)   VLDL Cholesterol 30 14 22   LDL Cholesterol  123 (A) 106 (A) 108 (A)   LDL/HDL Ratio 1.65 1.25 1.69   (A) Abnormal value            Lab Results   Component Value Date    POCTROP 0.05 12/07/2021       Results for orders placed in visit on 07/13/22    Adult Transthoracic Echo Complete W/ Cont if Necessary Per Protocol    Interpretation Summary  · Calculated left ventricular EF = 59% Estimated left ventricular EF was in agreement with the calculated left ventricular EF.  · Left ventricular diastolic function is consistent with (grade I) impaired relaxation.  · The aortic root measures 3.2 cm.  · Estimated right ventricular systolic pressure from tricuspid regurgitation is normal (<35 mmHg).                 Diagnoses and all orders for this visit:    1. History of pulmonary embolism (Primary)    2. Essential hypertension    3. Mixed hyperlipidemia      Assessment:    -History of bilateral PE with highly elevated pulmonary artery systolic pressure by echocardiogram from December 2021.  She has been on anticoagulation with Eliquis.  She had a repeat echocardiogram in July which demonstrated normalization of PA systolic pressure.  She has been feeling well without further shortness of breath or other cardiac symptoms.  Continue anticoagulation.    -Hypertension: Her blood pressure has been stable on current regimen which will be continued.    -Mixed dyslipidemia: Continue statin therapy.    Follow Up     Return in about 1 year (around 8/18/2023).        Patient was given instructions and counseling regarding her condition or for health maintenance advice. Please see specific information pulled into the AVS  if appropriate.

## 2022-09-08 ENCOUNTER — TRANSCRIBE ORDERS (OUTPATIENT)
Dept: ADMINISTRATIVE | Facility: HOSPITAL | Age: 67
End: 2022-09-08

## 2022-09-08 DIAGNOSIS — I10 ESSENTIAL HYPERTENSION: ICD-10-CM

## 2022-09-08 DIAGNOSIS — R79.89 ELEVATED SERUM CREATININE: Primary | ICD-10-CM

## 2022-09-22 ENCOUNTER — HOSPITAL ENCOUNTER (OUTPATIENT)
Dept: GENERAL RADIOLOGY | Facility: HOSPITAL | Age: 67
Discharge: HOME | End: 2022-09-22
Admitting: NURSE PRACTITIONER

## 2022-09-22 ENCOUNTER — PREP FOR SURGERY (OUTPATIENT)
Dept: SURGERY | Facility: SURGERY CENTER | Age: 67
End: 2022-09-22

## 2022-09-22 DIAGNOSIS — R13.19 ESOPHAGEAL DYSPHAGIA: ICD-10-CM

## 2022-09-22 DIAGNOSIS — R13.19 ESOPHAGEAL DYSPHAGIA: Primary | ICD-10-CM

## 2022-09-22 DIAGNOSIS — R93.3 ABNORMAL ESOPHAGRAM: ICD-10-CM

## 2022-09-22 PROCEDURE — 74221 X-RAY XM ESOPHAGUS 2CNTRST: CPT

## 2022-09-22 RX ORDER — SODIUM CHLORIDE 0.9 % (FLUSH) 0.9 %
3 SYRINGE (ML) INJECTION EVERY 12 HOURS SCHEDULED
Status: CANCELLED | OUTPATIENT
Start: 2022-09-22

## 2022-09-22 RX ORDER — SODIUM CHLORIDE, SODIUM LACTATE, POTASSIUM CHLORIDE, CALCIUM CHLORIDE 600; 310; 30; 20 MG/100ML; MG/100ML; MG/100ML; MG/100ML
30 INJECTION, SOLUTION INTRAVENOUS CONTINUOUS PRN
Status: CANCELLED | OUTPATIENT
Start: 2022-09-22

## 2022-09-22 RX ORDER — SODIUM CHLORIDE 0.9 % (FLUSH) 0.9 %
10 SYRINGE (ML) INJECTION AS NEEDED
Status: CANCELLED | OUTPATIENT
Start: 2022-09-22

## 2022-10-10 ENCOUNTER — OFFICE VISIT (OUTPATIENT)
Dept: PULMONOLOGY | Facility: CLINIC | Age: 67
End: 2022-10-10

## 2022-10-10 VITALS
DIASTOLIC BLOOD PRESSURE: 61 MMHG | HEART RATE: 69 BPM | WEIGHT: 192 LBS | TEMPERATURE: 97.7 F | BODY MASS INDEX: 30.13 KG/M2 | OXYGEN SATURATION: 97 % | HEIGHT: 67 IN | RESPIRATION RATE: 18 BRPM | SYSTOLIC BLOOD PRESSURE: 141 MMHG

## 2022-10-10 DIAGNOSIS — I27.82 CHRONIC PULMONARY EMBOLISM, UNSPECIFIED PULMONARY EMBOLISM TYPE, UNSPECIFIED WHETHER ACUTE COR PULMONALE PRESENT: Primary | ICD-10-CM

## 2022-10-10 DIAGNOSIS — F17.211 NICOTINE DEPENDENCE, CIGARETTES, IN REMISSION: ICD-10-CM

## 2022-10-10 DIAGNOSIS — I48.91 ATRIAL FIBRILLATION, UNSPECIFIED TYPE: ICD-10-CM

## 2022-10-10 DIAGNOSIS — Z23 NEED FOR INFLUENZA VACCINATION: ICD-10-CM

## 2022-10-10 PROCEDURE — G0008 ADMIN INFLUENZA VIRUS VAC: HCPCS | Performed by: NURSE PRACTITIONER

## 2022-10-10 PROCEDURE — 90662 IIV NO PRSV INCREASED AG IM: CPT | Performed by: NURSE PRACTITIONER

## 2022-10-10 PROCEDURE — 99213 OFFICE O/P EST LOW 20 MIN: CPT | Performed by: NURSE PRACTITIONER

## 2022-10-10 RX ORDER — METHOCARBAMOL 750 MG/1
750 TABLET, FILM COATED ORAL DAILY
Status: ON HOLD | COMMUNITY
Start: 2022-08-25 | End: 2022-11-21

## 2022-10-10 RX ORDER — AMOXICILLIN 500 MG/1
500 CAPSULE ORAL AS NEEDED
Status: ON HOLD | COMMUNITY
Start: 2022-09-27 | End: 2022-11-21

## 2022-10-10 NOTE — PROGRESS NOTES
Primary Care Provider  Annetta Austin APRN   Referring Provider  No ref. provider found    Patient Complaint  Follow-up pulmonary embolism      SUBJECTIVE    History of Presenting Illness  Patsy Tobin is a pleasant 66 y.o. female who presents to [facility] for month follow-up.  Patient has a history of pulmonary embolism.  She continues to be on Eliquis at this time.  Patient recently had a trip to [place] and again to [place] and has had no problems states she did get out and walk around while driving.  Patient is done well overall since her last visit she has not had any steroids or antibiotics for her lungs.  She continues to use albuterol inhaler and DuoNeb as needed.  Patient is interested in being able to stop her Eliquis come January.  Patient needs a flu vaccine today she is up-to-date with COVID and pneumonia.  Patient states she is not having any dyspnea, coughing, wheezing, headaches, chest pain, weight loss or hemoptysis. Denies fevers, chills and night sweats.  States that while on vacation she was able to walk and not have any dyspnea. Patsy Tobin is able to perform ADLs without difficulties and denies any swollen glands/lymph nodes in the head or neck.    Her history includes the following:admitted to [facility] on Dec 07, 2021 and discharged on Dec 09, 2021.Patient presented to the emergency room with increasing shortness of breath.  Patient did have a CT scan of the chest which showed multiple bilateral pulmonary emboli.  Patient was started on a heparin drip and supplemental oxygen.  Patient also had a bilateral lower extremity duplex which showed an acute left lower extremity DVT in the distal femoral, popliteal, and posterior tibial veins.  Patient also had an elevated troponin during her hospital stay, and cardiology was consulted.  An echocardiogram was performed which showed elevated RV pressures with a normal  ejection fracture, there is no evidence of right heart strain.  Patient was transitioned to Eliquis and will continue for 6 to 12 months.  Patient is looking forward to discontinuing Eliquis in January at her follow-up if she has no more events.      I have personally reviewed the review of systems, past family, social, medical and surgical histories; and agree with their findings.    Review of Systems   Constitutional: Negative.  Negative for chills, diaphoresis, fatigue and fever.   HENT: Negative.    Eyes: Negative.    Respiratory: Negative.  Negative for cough, shortness of breath, wheezing and stridor.    Cardiovascular: Negative.  Negative for chest pain, palpitations and leg swelling.   Musculoskeletal: Negative.    Skin: Negative.         Family History   Problem Relation Age of Onset   • Heart failure Mother    • Hypertension Mother    • Cancer Father         kidney   • Colon polyps Brother         Brother   • Diabetes Maternal Grandmother    • Colon cancer Neg Hx         Social History     Socioeconomic History   • Marital status:      Spouse name: Jose   • Years of education: High school   Tobacco Use   • Smoking status: Former     Packs/day: 1.00     Years: 22.00     Pack years: 22.00     Types: Cigarettes     Quit date: 1988     Years since quittin.8   • Smokeless tobacco: Never   Vaping Use   • Vaping Use: Never used   Substance and Sexual Activity   • Alcohol use: Yes     Comment: Social drinker, do not drink everyday   • Drug use: No   • Sexual activity: Yes     Partners: Male     Birth control/protection: Surgical     Comment: Hysterectomy in         Past Medical History:   Diagnosis Date   • Abdominal pain of multiple sites    • Allergic rhinitis    • AMEYA positive    • Blurred vision, bilateral    • Deep vein thrombosis (HCC)    • Depression    • Diarrhea    • Esophagitis 2016   • Fatigue    • Gastric ulcer    • GERD (gastroesophageal reflux disease)    • Headache    • High  blood pressure    • Hyperlipidemia    • Hypertension    • Leg cramps    • Leg wound, right    • Mitral valve prolapse    • Multiple joint pain    • Overweight    • Peptic ulceration    • PONV (postoperative nausea and vomiting)    • Pulmonary embolism (HCC) 11/25/2021   • RA (rheumatoid arthritis) (HCC)    • Superficial phlebitis     in left ankle    • Tattoos     permanent makeup         Immunization History   Administered Date(s) Administered   • COVID-19 (PFIZER) PURPLE CAP 03/02/2021, 03/23/2021, 01/10/2022   • Flu Vaccine Split Quad 09/18/2015, 10/14/2016   • FluLaval/Fluzone >6mos 10/15/2019   • Fluad Quad 65+ 10/01/2020, 11/01/2021, 11/18/2021   • Fluzone High-Dose 65+yrs 10/10/2022   • Hepatitis A 05/08/2018, 12/28/2018   • Influenza, Unspecified 01/01/2012, 10/15/2019   • Pneumococcal Conjugate 13-Valent (PCV13) 03/24/2020   • Pneumococcal Polysaccharide (PPSV23) 02/19/2019   • flucelvax quad pfs =>4 YRS 10/30/2017, 10/16/2018       Allergies   Allergen Reactions   • Celebrex [Celecoxib] Other (See Comments)     Passed Out    • Arava [Leflunomide] Other (See Comments)     Altered Glucose Levels   • Duloxetine Hallucinations   • Naproxen GI Intolerance     All NSAIDS    • Nsaids GI Intolerance     Other reaction(s): Stomach upset     • Plaquenil [Hydroxychloroquine Sulfate] Mental Status Change          Current Outpatient Medications:   •  alosetron (LOTRONEX) 0.5 MG tablet, Take 1 tablet by mouth 2 (Two) Times a Day As Needed (diarrhea)., Disp: 60 tablet, Rfl: 5  •  amoxicillin (AMOXIL) 500 MG capsule, Take 1 capsule by mouth As Needed. Take before dental appointment, Disp: , Rfl:   •  apixaban (ELIQUIS) 5 MG tablet tablet, Take 1 tablet twice a day., Disp: 60 tablet, Rfl: 11  •  aspirin 81 MG chewable tablet, Chew 81 mg Daily., Disp: , Rfl:   •  cetirizine (zyrTEC) 10 MG tablet, TAKE 1 TABLET BY MOUTH IN THE MORNING AS NEEDED, Disp: , Rfl:   •  etanercept (ENBREL) 50 MG/ML solution prefilled syringe  "injection, Inject 50 mg under the skin 1 (One) Time Per Week., Disp: , Rfl:   •  Fiber Adult Gummies 2 g chewable tablet, Chew., Disp: , Rfl:   •  fluticasone (FLONASE) 50 MCG/ACT nasal spray, 2 sprays into the nostril(s) as directed by provider Daily., Disp: , Rfl:   •  furosemide (LASIX) 20 MG tablet, 20 mg Daily., Disp: , Rfl:   •  HYDROcodone-acetaminophen (NORCO) 5-325 MG per tablet, Take 1 tablet by mouth Every 8 (Eight) Hours As Needed. for pain, Disp: , Rfl:   •  hyoscyamine (LEVSIN) 0.125 MG SL tablet, Take 1 tablet by mouth Every 6 (Six) Hours As Needed (esophageal spasm)., Disp: 60 tablet, Rfl: 2  •  lisinopril (PRINIVIL,ZESTRIL) 10 MG tablet, Take 10 mg by mouth Daily., Disp: , Rfl:   •  lovastatin (MEVACOR) 20 MG tablet, Take 20 mg by mouth Every Night., Disp: , Rfl:   •  methocarbamol (ROBAXIN) 750 MG tablet, Take 1 tablet by mouth Daily., Disp: , Rfl:   •  metoprolol succinate XL (TOPROL-XL) 25 MG 24 hr tablet, Take 1 tablet by mouth Daily., Disp: 90 tablet, Rfl: 3  •  omeprazole (priLOSEC) 40 MG capsule, Take 1 capsule by mouth Daily., Disp: 90 capsule, Rfl: 3  •  potassium chloride (K-DUR,KLOR-CON) 20 MEQ CR tablet, 20 mEq Daily., Disp: , Rfl:        OBJECTIVE    Vital Signs   /61 (BP Location: Left arm, Patient Position: Sitting, Cuff Size: Adult)   Pulse 69   Temp 97.7 °F (36.5 °C) (Tympanic)   Resp 18   Ht 170.2 cm (67\")   Wt 87.1 kg (192 lb) Comment: per patient  SpO2 97% Comment: room air  BMI 30.07 kg/m²     Physical Exam  Vitals reviewed.   Constitutional:       General: She is not in acute distress.     Appearance: Normal appearance. She is obese. She is not ill-appearing.   HENT:      Head: Normocephalic and atraumatic.      Nose: Nose normal.      Mouth/Throat:      Mouth: Mucous membranes are moist.      Pharynx: Oropharynx is clear.   Eyes:      Extraocular Movements: Extraocular movements intact.      Conjunctiva/sclera: Conjunctivae normal.      Pupils: Pupils are equal, " round, and reactive to light.   Cardiovascular:      Rate and Rhythm: Normal rate and regular rhythm.      Pulses: Normal pulses.      Heart sounds: Normal heart sounds.   Pulmonary:      Effort: Pulmonary effort is normal. No respiratory distress.      Breath sounds: Normal breath sounds. No stridor. No wheezing, rhonchi or rales.   Abdominal:      General: Bowel sounds are normal.   Musculoskeletal:         General: Normal range of motion.      Cervical back: Normal range of motion and neck supple.      Right lower leg: No edema.      Left lower leg: No edema.   Skin:     General: Skin is warm and dry.   Neurological:      General: No focal deficit present.      Mental Status: She is alert and oriented to person, place, and time.   Psychiatric:         Behavior: Behavior normal.          Results Review  I have personally reviewed the NM lung scan as follows:    NM Lung Scan Perfusion Particulate [ELX089] (Order [id number])  Order  Status: Final result     Appointment Information    Display Notes    V-AS                   PACS Images     Radiology Images    Study Result    Narrative & Impression   PROCEDURE:  NM LUNG SCAN PERFUSION PARTICULATE     COMPARISON: [facility], CR, XR CHEST 2 VW, 7/13/2022, 13:48.     TECHNIQUE:    After obtaining the patient's consent, Tc-99m MAA was injected intravenously and images   subsequently obtained.       RADIONUCLIDE:         4.11 mCi     Tc99m MAA - I.V.     FINDINGS:          PERFUSION DEFECTS:             None.    OTHER:             Negative.       IMPRESSION:               Low probability of acute pulmonary embolic disease.            CHUCK PURCELL MD         Electronically Signed and Approved By: CHUCK PURCELL MD on 7/13/2022 at 15:58                ASSESSMENT & PLAN    Patient Active Problem List   Diagnosis   • Thrombophlebitis leg   • Rectal bleeding   • Anemia   • Diarrhea   • Gastroesophageal reflux disease   • Chronic pulmonary embolism (HCC)   • Depressive  disorder   • Gastric reflux   • Hyperlipidemia   • Hypertension   • Mitral valve prolapse   • Phlebitis   • Pleurisy with effusion, with mention of bacterial cause other than tuberculosis   • Pneumonia   • Rheumatoid arthritis (HCC)   • Preoperative examination, unspecified   • Right bundle-branch block   • Serum creatinine raised   • Overweight       Diagnoses and all orders for this visit:    1. Chronic pulmonary embolism, unspecified pulmonary embolism type, unspecified whether acute cor pulmonale present (HCC) (Primary)    2. Atrial fibrillation, unspecified type (HCC)    3. Nicotine dependence, cigarettes, in remission    4. Need for influenza vaccination  -     Fluzone High-Dose 65+yrs (2705-6423)           Plan:  At this time patient will get the high-dose flu vaccine today.  Patient will continue with her Eliquis at this time. patient will follow-up in 4 months or sooner if needed.  We will plan to look at discontinuing her Eliquis at her follow-up visit if she has no further thromboembolic events.     Smoking status: Former  Vaccination status: Up to date  Medications personally reviewed    Follow Up  Return in about 3 months (around 1/10/2023).    Patient was given instructions and counseling regarding her condition or for health maintenance advice. Please see specific information pulled into the AVS if appropriate.

## 2022-11-08 DIAGNOSIS — I47.1 PAROXYSMAL SVT (SUPRAVENTRICULAR TACHYCARDIA): ICD-10-CM

## 2022-11-08 DIAGNOSIS — I10 ESSENTIAL HYPERTENSION: ICD-10-CM

## 2022-11-09 RX ORDER — METOPROLOL SUCCINATE 25 MG/1
TABLET, EXTENDED RELEASE ORAL
Qty: 90 TABLET | Refills: 3 | Status: SHIPPED | OUTPATIENT
Start: 2022-11-09

## 2022-11-10 ENCOUNTER — TELEPHONE (OUTPATIENT)
Dept: PULMONOLOGY | Facility: CLINIC | Age: 67
End: 2022-11-10

## 2022-11-10 NOTE — TELEPHONE ENCOUNTER
Pt called stated she has an appointment to have an EGD with Gastro on 11/21/2022. Pt stated they are faxing a form over asking if pt can be off her blood thinner in order to do the EGD. Please Advise.

## 2022-11-11 PROBLEM — R13.19 ESOPHAGEAL DYSPHAGIA: Status: ACTIVE | Noted: 2022-11-11

## 2022-11-11 PROBLEM — R93.3 ABNORMAL ESOPHAGRAM: Status: ACTIVE | Noted: 2022-11-11

## 2022-11-11 NOTE — TELEPHONE ENCOUNTER
Pt was called and informed. Pt stated her gastro physician office is suppose to fax a form over to be filled out.

## 2022-11-17 NOTE — SIGNIFICANT NOTE
Education provided the Patient on the following:    - Nothing to Eat or Drink after MN the night before the procedure  -You will need to have someone drive you home after your EGD and remain with you for 24 hours after the EGD  - The date of your EGD, your are welcome to have one visitor at bedside or remain within 10-15 minutes of [facility] [place]  -Please wear warm socks when you arrive for your EGD  -Remove all jewelry and leave any valuables before arriving on the date of your procedure (all will have to be removed before leaving preop)  -You will need to arrive at 0915  on 11/21 EGD  -Feel free to contact us at: [phone] with any additional questions/concerns

## 2022-11-21 ENCOUNTER — ANESTHESIA (OUTPATIENT)
Dept: SURGERY | Facility: SURGERY CENTER | Age: 67
End: 2022-11-21

## 2022-11-21 ENCOUNTER — ANESTHESIA EVENT (OUTPATIENT)
Dept: SURGERY | Facility: SURGERY CENTER | Age: 67
End: 2022-11-21

## 2022-11-21 ENCOUNTER — HOSPITAL ENCOUNTER (OUTPATIENT)
Facility: SURGERY CENTER | Age: 67
Setting detail: HOSPITAL OUTPATIENT SURGERY
Discharge: HOME | End: 2022-11-21
Attending: INTERNAL MEDICINE | Admitting: INTERNAL MEDICINE

## 2022-11-21 VITALS
OXYGEN SATURATION: 97 % | BODY MASS INDEX: 31.17 KG/M2 | WEIGHT: 198.6 LBS | HEIGHT: 67 IN | SYSTOLIC BLOOD PRESSURE: 117 MMHG | HEART RATE: 56 BPM | RESPIRATION RATE: 20 BRPM | DIASTOLIC BLOOD PRESSURE: 53 MMHG | TEMPERATURE: 98.4 F

## 2022-11-21 DIAGNOSIS — R93.3 ABNORMAL ESOPHAGRAM: ICD-10-CM

## 2022-11-21 DIAGNOSIS — R13.19 ESOPHAGEAL DYSPHAGIA: ICD-10-CM

## 2022-11-21 PROCEDURE — 88305 TISSUE EXAM BY PATHOLOGIST: CPT | Performed by: INTERNAL MEDICINE

## 2022-11-21 PROCEDURE — 43249 ESOPH EGD DILATION <30 MM: CPT | Performed by: INTERNAL MEDICINE

## 2022-11-21 PROCEDURE — C1726 CATH, BAL DIL, NON-VASCULAR: HCPCS | Performed by: INTERNAL MEDICINE

## 2022-11-21 PROCEDURE — 25010000002 PROPOFOL 10 MG/ML EMULSION: Performed by: STUDENT IN AN ORGANIZED HEALTH CARE EDUCATION/TRAINING PROGRAM

## 2022-11-21 PROCEDURE — 43239 EGD BIOPSY SINGLE/MULTIPLE: CPT | Performed by: INTERNAL MEDICINE

## 2022-11-21 RX ORDER — PROPOFOL 10 MG/ML
VIAL (ML) INTRAVENOUS AS NEEDED
Status: DISCONTINUED | OUTPATIENT
Start: 2022-11-21 | End: 2022-11-21 | Stop reason: SURG

## 2022-11-21 RX ORDER — AMOXICILLIN 500 MG/1
500 CAPSULE ORAL 2 TIMES DAILY
COMMUNITY
End: 2023-01-31

## 2022-11-21 RX ORDER — LIDOCAINE HYDROCHLORIDE 20 MG/ML
INJECTION, SOLUTION INFILTRATION; PERINEURAL AS NEEDED
Status: DISCONTINUED | OUTPATIENT
Start: 2022-11-21 | End: 2022-11-21 | Stop reason: SURG

## 2022-11-21 RX ORDER — PROMETHAZINE HYDROCHLORIDE 25 MG/1
25 SUPPOSITORY RECTAL ONCE AS NEEDED
Status: DISCONTINUED | OUTPATIENT
Start: 2022-11-21 | End: 2022-11-21 | Stop reason: HOSPADM

## 2022-11-21 RX ORDER — MAGNESIUM HYDROXIDE 1200 MG/15ML
LIQUID ORAL AS NEEDED
Status: DISCONTINUED | OUTPATIENT
Start: 2022-11-21 | End: 2022-11-21 | Stop reason: HOSPADM

## 2022-11-21 RX ORDER — SODIUM CHLORIDE 0.9 % (FLUSH) 0.9 %
3 SYRINGE (ML) INJECTION EVERY 12 HOURS SCHEDULED
Status: DISCONTINUED | OUTPATIENT
Start: 2022-11-21 | End: 2022-11-21 | Stop reason: HOSPADM

## 2022-11-21 RX ORDER — SODIUM CHLORIDE 0.9 % (FLUSH) 0.9 %
10 SYRINGE (ML) INJECTION AS NEEDED
Status: DISCONTINUED | OUTPATIENT
Start: 2022-11-21 | End: 2022-11-21 | Stop reason: HOSPADM

## 2022-11-21 RX ORDER — SODIUM CHLORIDE, SODIUM LACTATE, POTASSIUM CHLORIDE, CALCIUM CHLORIDE 600; 310; 30; 20 MG/100ML; MG/100ML; MG/100ML; MG/100ML
30 INJECTION, SOLUTION INTRAVENOUS CONTINUOUS PRN
Status: DISCONTINUED | OUTPATIENT
Start: 2022-11-21 | End: 2022-11-21 | Stop reason: HOSPADM

## 2022-11-21 RX ORDER — ALOSETRON HYDROCHLORIDE 0.5 MG/1
0.5 TABLET, FILM COATED ORAL 2 TIMES DAILY
COMMUNITY

## 2022-11-21 RX ORDER — PROMETHAZINE HYDROCHLORIDE 12.5 MG/1
25 TABLET ORAL ONCE AS NEEDED
Status: DISCONTINUED | OUTPATIENT
Start: 2022-11-21 | End: 2022-11-21 | Stop reason: HOSPADM

## 2022-11-21 RX ADMIN — PROPOFOL 120 MG: 10 INJECTION, EMULSION INTRAVENOUS at 10:06

## 2022-11-21 RX ADMIN — SODIUM CHLORIDE, POTASSIUM CHLORIDE, SODIUM LACTATE AND CALCIUM CHLORIDE 30 ML/HR: 600; 310; 30; 20 INJECTION, SOLUTION INTRAVENOUS at 09:25

## 2022-11-21 RX ADMIN — LIDOCAINE HYDROCHLORIDE 40 MG: 20 INJECTION, SOLUTION INFILTRATION; PERINEURAL at 10:06

## 2022-11-21 RX ADMIN — PROPOFOL 160 MCG/KG/MIN: 10 INJECTION, EMULSION INTRAVENOUS at 10:06

## 2022-11-21 NOTE — H&P
No chief complaint on file.      HPI  Dysphagia  Abnormal esophagram         Problem List:    Patient Active Problem List   Diagnosis   • Thrombophlebitis leg   • Rectal bleeding   • Anemia   • Diarrhea   • Gastroesophageal reflux disease   • Chronic pulmonary embolism (HCC)   • Depressive disorder   • Gastric reflux   • Hyperlipidemia   • Hypertension   • Mitral valve prolapse   • Phlebitis   • Pleurisy with effusion, with mention of bacterial cause other than tuberculosis   • Pneumonia   • Rheumatoid arthritis (HCC)   • Preoperative examination, unspecified   • Right bundle-branch block   • Serum creatinine raised   • Overweight   • Esophageal dysphagia   • Abnormal esophagram       Medical History:    Past Medical History:   Diagnosis Date   • Abdominal pain of multiple sites    • Allergic rhinitis    • AMEYA positive    • Blurred vision, bilateral    • Deep vein thrombosis (HCC)    • Depression    • Diarrhea    • Esophagitis 2016   • Fatigue    • Gastric ulcer    • GERD (gastroesophageal reflux disease)    • Headache    • High blood pressure    • Hyperlipidemia    • Hypertension    • Leg cramps    • Leg wound, right    • Mitral valve prolapse    • Multiple joint pain    • Overweight    • Peptic ulceration    • PONV (postoperative nausea and vomiting)    • Pulmonary embolism ([facility]) 2021   • RA (rheumatoid arthritis) ([facility])    • Superficial phlebitis     in left ankle    • Tattoos     permanent makeup         Social History:    Social History     Socioeconomic History   • Marital status:      Spouse name: Jose   • Years of education: High school   Tobacco Use   • Smoking status: Former     Packs/day: 1.00     Years: 22.00     Pack years: 22.00     Types: Cigarettes     Quit date: 1988     Years since quittin.9   • Smokeless tobacco: Never   Vaping Use   • Vaping Use: Never used   Substance and Sexual Activity   • Alcohol use: Yes     Comment: Social drinker, do not drink everyday   • Drug use:  No   • Sexual activity: Yes     Partners: Male     Birth control/protection: Surgical     Comment: Hysterectomy in 1988       Family History:   Family History   Problem Relation Age of Onset   • Heart failure Mother    • Hypertension Mother    • Cancer Father         kidney   • Colon polyps Brother         Brother   • Diabetes Maternal Grandmother    • Colon cancer Neg Hx        Surgical History:   Past Surgical History:   Procedure Laterality Date   • ABDOMINOPLASTY N/A 12/02/2014    Due to appendectomy site having staph infection-Dr. Keagan Yip   • APPENDECTOMY N/A 1958    Dr. Henry   • COLONOSCOPY  2018    Junaidvlin   • COLONOSCOPY N/A 3/12/2021    Procedure: COLONOSCOPY;  Surgeon: Rajesh Loco MD;  Location: [facility];  Service: Gastroenterology;  Laterality: N/A;   • ELBOW ARTHROPLASTY     • ENDOSCOPY N/A 2016    Dr. Rajesh Loco    • ENDOSCOPY N/A 3/12/2021    Procedure: ESOPHAGOGASTRODUODENOSCOPY;  Surgeon: Rajesh Loco MD;  Location: [facility];  Service: Gastroenterology;  Laterality: N/A;   • FLEXIBLE SIGMOIDOSCOPY N/A 2016    Dr. Rajesh Loco   • HEMORRHOIDECTOMY N/A 2009    Dr. Saba   • HIP BIPOLAR REPLACEMENT     • KNEE ARTHROSCOPY W/ MENISCECTOMY Left 2004    Dr. Rice   • KNEE MENISCECTOMY Right 2007    Dr. Rice   • SUBTOTAL HYSTERECTOMY Bilateral 1988    Dr. Sanford. Not due to cancer. Robotic-assisted    • TUBAL ABDOMINAL LIGATION Bilateral    • UPPER GASTROINTESTINAL ENDOSCOPY N/A 10/20/2016    UGI with biopsy. Normal esophagus: injected with botulinum toxin, Erythematous mucoas in the antrum: biopsied, normal duodenum-Dr. Rajesh Loco       No current facility-administered medications for this encounter.    Current Outpatient Medications:   •  alosetron (LOTRONEX) 0.5 MG tablet, Take 1 tablet by mouth 2 (Two) Times a Day As Needed (diarrhea)., Disp: 60 tablet, Rfl: 5  •  amoxicillin (AMOXIL) 500 MG capsule, Take 1 capsule by mouth As Needed. Take before dental  appointment, Disp: , Rfl:   •  apixaban (ELIQUIS) 5 MG tablet tablet, Take 1 tablet twice a day., Disp: 60 tablet, Rfl: 11  •  aspirin 81 MG chewable tablet, Chew 81 mg Daily., Disp: , Rfl:   •  cetirizine (zyrTEC) 10 MG tablet, TAKE 1 TABLET BY MOUTH IN THE MORNING AS NEEDED, Disp: , Rfl:   •  etanercept (ENBREL) 50 MG/ML solution prefilled syringe injection, Inject 50 mg under the skin 1 (One) Time Per Week., Disp: , Rfl:   •  Fiber Adult Gummies 2 g chewable tablet, Chew., Disp: , Rfl:   •  fluticasone (FLONASE) 50 MCG/ACT nasal spray, 2 sprays into the nostril(s) as directed by provider Daily., Disp: , Rfl:   •  furosemide (LASIX) 20 MG tablet, 20 mg Daily., Disp: , Rfl:   •  HYDROcodone-acetaminophen (NORCO) 5-325 MG per tablet, Take 1 tablet by mouth Every 8 (Eight) Hours As Needed. for pain, Disp: , Rfl:   •  hyoscyamine (LEVSIN) 0.125 MG SL tablet, Take 1 tablet by mouth Every 6 (Six) Hours As Needed (esophageal spasm)., Disp: 60 tablet, Rfl: 2  •  lisinopril (PRINIVIL,ZESTRIL) 10 MG tablet, Take 10 mg by mouth Daily., Disp: , Rfl:   •  lovastatin (MEVACOR) 20 MG tablet, Take 20 mg by mouth Every Night., Disp: , Rfl:   •  methocarbamol (ROBAXIN) 750 MG tablet, Take 1 tablet by mouth Daily., Disp: , Rfl:   •  metoprolol succinate XL (TOPROL-XL) 25 MG 24 hr tablet, Take 1 tablet by mouth once daily, Disp: 90 tablet, Rfl: 3  •  omeprazole (priLOSEC) 40 MG capsule, Take 1 capsule by mouth Daily., Disp: 90 capsule, Rfl: 3  •  potassium chloride (K-DUR,KLOR-CON) 20 MEQ CR tablet, 20 mEq Daily., Disp: , Rfl:     Allergies:   Allergies   Allergen Reactions   • Celebrex [Celecoxib] Other (See Comments)     Passed Out    • Arava [Leflunomide] Other (See Comments)     Altered Glucose Levels   • Duloxetine Hallucinations   • Naproxen GI Intolerance     All NSAIDS    • Nsaids GI Intolerance     Other reaction(s): Stomach upset     • Plaquenil [Hydroxychloroquine Sulfate] Mental Status Change        The following portions  of the patient's history were reviewed by me and updated as appropriate: review of systems, allergies, current medications, past family history, past medical history, past social history, past surgical history and problem list.    There were no vitals filed for this visit.    PHYSICAL EXAM:    CONSTITUTIONAL:  today's vital signs reviewed by me  GASTROINTESTINAL: abdomen is soft nontender nondistended with normal active bowel sounds, no masses are appreciated    Assessment/ Plan  Dysphagia  Abnormal esophagram    egd    Risks and benefits as well as alternatives to endoscopic evaluation were explained to the patient and they voiced understanding and wish to proceed.  These risks include but are not limited to the risk of bleeding, perforation, adverse reaction to sedation, and missed lesions.  The patient was given the opportunity to ask questions prior to the endoscopic procedure.

## 2022-11-21 NOTE — ANESTHESIA POSTPROCEDURE EVALUATION
"Patient: Patsy Tobin    Procedure Summary     Date: 11/21/22 Room / Location: [facility]  / [facility]    Anesthesia Start: 1000 Anesthesia Stop: 1022    Procedure: ESOPHAGOGASTRODUODENOSCOPY WITH BIOPSY AND DILATION Diagnosis:       Esophageal dysphagia      Abnormal esophagram      (Esophageal dysphagia [R13.19])      (Abnormal esophagram [R93.3])    Surgeons: Rajesh Loco MD Provider: Albino Alfredo MD    Anesthesia Type: MAC ASA Status: 3          Anesthesia Type: MAC    Vitals  Vitals Value Taken Time   /61 11/21/22 1022   Temp     Pulse 63 11/21/22 1022   Resp     SpO2 93 % 11/21/22 1022   Vitals shown include unvalidated device data.        Post Anesthesia Care and Evaluation    Patient location during evaluation: PACU  Patient participation: complete - patient participated  Level of consciousness: awake and alert  Pain management: adequate    Airway patency: patent  Anesthetic complications: No anesthetic complications  PONV Status: controlled  Cardiovascular status: acceptable and hemodynamically stable  Respiratory status: acceptable  Hydration status: acceptable    Comments: /83 (BP Location: Left arm, Patient Position: Lying)   Pulse 62   Temp 36.2 °C (97.1 °F) (Temporal)   Resp 20   Ht 170.2 cm (67\")   Wt 90.1 kg (198 lb 9.6 oz)   SpO2 99%   BMI 31.11 kg/m²       "

## 2022-11-22 LAB
LAB AP CASE REPORT: NORMAL
LAB AP CLINICAL INFORMATION: NORMAL
PATH REPORT.FINAL DX SPEC: NORMAL
PATH REPORT.GROSS SPEC: NORMAL

## 2023-01-09 NOTE — PROGRESS NOTES
Primary Care Provider  Annetta Austin APRN   Referring Provider  No ref. provider found    Patient Complaint  Follow-up and pulmonay embolis      SUBJECTIVE    History of Presenting Illness  Patsy Tobin is a pleasant 67 y.o. female who presents to [facility] for 3 month followup. I last saw the patient 10/10/2022. Patient has a history of pulmonary embolism and DVT provoked by long distance travel in November 2021.  She continues to be on Eliquis at this time.  Patient has been on Eliquis since December 2021 for multiple bilateral PE's and left lower extremity DVT's.  Patient states she is doing overall well with her breathing since her last visit.  She has had no other episodes of blood clots.  She is wanting to come off of Eliquis today.  She is aware of the risk of having another clot.  Patient states she is planning a trip to [place] this weekend and will stay on Eliquis till she comes back and then she is going to stop it after her trip.  At present time she is suffering from a flare of gout in her right great toe.  She is currently on steroids her PCP.  She has a follow-up tomorrow with them regarding gout.    Denies dyspnea, coughing, wheezing, headaches, chest pain, weight loss or hemoptysis. Denies fevers, chills and night sweats. Patsy Tobin is able to perform ADLs without difficulties and denies any swollen glands/lymph nodes in the head or neck.    I have personally reviewed the review of systems, past family, social, medical and surgical histories; and agree with their findings.    Review of Systems   Constitutional: Negative.    HENT: Negative.    Eyes: Negative.    Respiratory: Negative.    Cardiovascular: Negative.    Musculoskeletal: Positive for joint swelling.        Gout right great toe   Skin: Negative.         Family History   Problem Relation Age of Onset   • Heart failure Mother    • Hypertension Mother    • Cancer  Father         kidney   • Colon polyps Brother         Brother   • Diabetes Maternal Grandmother    • Cancer Brother         Lung and Liver Cancer   • Colon cancer Neg Hx         Social History     Socioeconomic History   • Marital status:      Spouse name: Jose   • Years of education: High school   Tobacco Use   • Smoking status: Former     Packs/day: 1.00     Years: 22.00     Pack years: 22.00     Types: Cigarettes     Quit date: 1988     Years since quittin.0   • Smokeless tobacco: Never   Vaping Use   • Vaping Use: Never used   Substance and Sexual Activity   • Alcohol use: Yes     Comment: Social drinker, do not drink everyday   • Drug use: No   • Sexual activity: Yes     Partners: Male     Birth control/protection: Surgical     Comment: Hysterectomy in         Past Medical History:   Diagnosis Date   • Abdominal pain of multiple sites    • Allergic rhinitis    • AMEYA positive    • Anemia    • Blurred vision, bilateral    • Deep vein thrombosis (HCC)    • Depression    • Diarrhea    • Esophagitis    • Fatigue    • Gastric ulcer    • GERD (gastroesophageal reflux disease)    • Headache    • High blood pressure    • Hyperlipidemia    • Hypertension    • Leg cramps    • Leg wound, right    • Mitral valve prolapse    • Multiple joint pain    • Overweight    • Peptic ulceration    • PONV (postoperative nausea and vomiting)    • Pulmonary embolism (HCC) 2021   • RA (rheumatoid arthritis) (HCC)    • Superficial phlebitis     in left ankle    • Tattoos     permanent makeup         Immunization History   Administered Date(s) Administered   • COVID-19 (PFIZER) BIVALENT BOOSTER 12+YRS 2022   • COVID-19 (PFIZER) PURPLE CAP 2021, 2021, 01/10/2022   • Flu Vaccine Split Quad 2015, 10/14/2016   • FluLaval/Fluzone >6mos 10/15/2019   • Fluad Quad 65+ 10/01/2020, 2021, 2021   • Fluzone High-Dose 65+yrs 10/10/2022   • Hepatitis A 2018, 2018   •  Influenza, Unspecified 01/01/2012, 10/15/2019   • Pneumococcal Conjugate 13-Valent (PCV13) 03/24/2020   • Pneumococcal Polysaccharide (PPSV23) 02/19/2019   • flucelvax quad pfs =>4 YRS 10/30/2017, 10/16/2018       Allergies   Allergen Reactions   • Celebrex [Celecoxib] Other (See Comments)     Passed Out    • Arava [Leflunomide] Other (See Comments)     Altered Glucose Levels   • Duloxetine Hallucinations   • Naproxen GI Intolerance     All NSAIDS    • Nsaids GI Intolerance     Other reaction(s): Stomach upset     • Plaquenil [Hydroxychloroquine Sulfate] Mental Status Change          Current Outpatient Medications:   •  alosetron (LOTRONEX) 0.5 MG tablet, Take 0.5 mg by mouth 2 (Two) Times a Day., Disp: , Rfl:   •  amoxicillin (AMOXIL) 500 MG capsule, Take 500 mg by mouth 2 (Two) Times a Day. With dental appointments, Disp: , Rfl:   •  apixaban (ELIQUIS) 5 MG tablet tablet, Take 1 tablet twice a day., Disp: 60 tablet, Rfl: 11  •  aspirin 81 MG chewable tablet, Chew 81 mg Daily., Disp: , Rfl:   •  cetirizine (zyrTEC) 10 MG tablet, TAKE 1 TABLET BY MOUTH IN THE MORNING AS NEEDED, Disp: , Rfl:   •  etanercept (ENBREL) 50 MG/ML solution prefilled syringe injection, Inject 50 mg under the skin 1 (One) Time Per Week., Disp: , Rfl:   •  Fiber Adult Gummies 2 g chewable tablet, Chew., Disp: , Rfl:   •  fluticasone (FLONASE) 50 MCG/ACT nasal spray, 2 sprays into the nostril(s) as directed by provider Daily., Disp: , Rfl:   •  furosemide (LASIX) 20 MG tablet, 20 mg Daily., Disp: , Rfl:   •  lisinopril (PRINIVIL,ZESTRIL) 10 MG tablet, Take 10 mg by mouth Daily., Disp: , Rfl:   •  lovastatin (MEVACOR) 20 MG tablet, Take 20 mg by mouth Every Night., Disp: , Rfl:   •  metoprolol succinate XL (TOPROL-XL) 25 MG 24 hr tablet, Take 1 tablet by mouth once daily, Disp: 90 tablet, Rfl: 3  •  omeprazole (priLOSEC) 40 MG capsule, Take 1 capsule by mouth Daily., Disp: 90 capsule, Rfl: 3  •  potassium chloride (K-DUR,KLOR-CON) 20 MEQ CR  tablet, 20 mEq Daily., Disp: , Rfl:   •  potassium chloride ER (K-TAB) 20 MEQ tablet controlled-release ER tablet, Take 20 mEq by mouth Daily., Disp: , Rfl:   •  predniSONE (DELTASONE) 10 MG tablet, Take 10 mg by mouth 3 (Three) Times a Day., Disp: , Rfl:        OBJECTIVE    Vital Signs   BP (!) 190/90 (BP Location: Right arm, Patient Position: Sitting, Cuff Size: Adult)   Pulse 69   Temp 99.1 °F (37.3 °C) (Tympanic)   Resp 18   Ht 170.2 cm (67\")   Wt 89 kg (196 lb 3.2 oz)   SpO2 98% Comment: room air  BMI 30.73 kg/m²     Physical Exam  Vitals reviewed.   Constitutional:       Appearance: Normal appearance.   HENT:      Head: Normocephalic and atraumatic.      Nose: Nose normal.      Mouth/Throat:      Mouth: Mucous membranes are moist.      Pharynx: Oropharynx is clear.   Eyes:      Extraocular Movements: Extraocular movements intact.      Conjunctiva/sclera: Conjunctivae normal.      Pupils: Pupils are equal, round, and reactive to light.   Cardiovascular:      Rate and Rhythm: Normal rate and regular rhythm.      Pulses: Normal pulses.      Heart sounds: Normal heart sounds.   Pulmonary:      Effort: Pulmonary effort is normal.      Breath sounds: Normal breath sounds.   Abdominal:      General: Bowel sounds are normal.   Musculoskeletal:         General: Normal range of motion.      Cervical back: Normal range of motion and neck supple.      Comments: Gout right great toe with tenderness redness swelling   Skin:     General: Skin is warm and dry.   Neurological:      Mental Status: She is alert and oriented to person, place, and time.   Psychiatric:         Behavior: Behavior normal.          Results Review  I have personally reviewed the prior office notes, diagnostics.    Patsy Tobin  Duplex scan of extremity veins including responses to compression and other maneuvers; unilateral  Order# [id number]  Reading physician: Saul Fowler MD Ordering physician: Annetta Austin APRN Study  date: 22     Patient Information    Patient Name   Patsy Tobin MRN   [id number] Legal Sex   Female  (Age)   1955 (67 y.o.)     Clinical Indication    i82.409   Dx: Deep phlebothrombosis, antepartum, with delivery (formerly [facility]) [O22.30, I82.409 (ICD-10-CM)]     Interpretation Summary    • Chronic left lower extremity deep vein thrombosis noted in the popliteal. No evidence of progression in comparison to the findings noted on study dated 3/17/2022.  • All other left sided veins appeared normal.  • Overall findings are improved in comparison to the 3/17/2022 study without evidence of any progression.      NM Lung Scan Perfusion Particulate [YRA472] (Order [id number])  Order  Status: Final result     Appointment Information    Display Notes    V-AS                   PACS Images     Radiology Images    Study Result    Narrative & Impression   PROCEDURE:  NM LUNG SCAN PERFUSION PARTICULATE     COMPARISON: [facility], CR, XR CHEST 2 VW, 2022, 13:48.     TECHNIQUE:    After obtaining the patient's consent, Tc-99m MAA was injected intravenously and images   subsequently obtained.       RADIONUCLIDE:         4.11 mCi     Tc99m MAA - I.V.     FINDINGS:          PERFUSION DEFECTS:             None.    OTHER:             Negative.       IMPRESSION:               Low probability of acute pulmonary embolic disease.            CHUCK PURCELL MD         Electronically Signed and Approved By: CHUCK PURCELL MD on 2022 at 15:58                   ASSESSMENT & PLAN    Patient Active Problem List   Diagnosis   • Thrombophlebitis leg   • Rectal bleeding   • Anemia   • Diarrhea   • Gastroesophageal reflux disease   • Chronic pulmonary embolism (HCC)   • Depressive disorder   • Gastric reflux   • Hyperlipidemia   • Hypertension   • Mitral valve prolapse   • Phlebitis   • Pleurisy with effusion, with mention of bacterial cause other than tuberculosis   • Pneumonia   • Rheumatoid arthritis (HCC)   • Preoperative  examination, unspecified   • Right bundle-branch block   • Serum creatinine raised   • Overweight   • Esophageal dysphagia   • Abnormal esophagram       Diagnoses and all orders for this visit:    1. Chronic pulmonary embolism, unspecified pulmonary embolism type, unspecified whether acute cor pulmonale present (HCC) (Primary)    2. Acute deep vein thrombosis (DVT) of other specified vein of left lower extremity (HCC)    3. Acute gout involving toe of right foot, unspecified cause           Plan:  Patient's blood pressure is elevated today with a low-grade temp.  Patient does not feel symptomatic.  Declines exposure to anyone with flu or COVID.  Patient advised to follow-up with her PCP tomorrow regarding her blood pressure when she follows up for her gout tomorrow.  At this time patient is going to discontinue her Eliquis when she returns from a trip to [place].  Patient is aware of risk potentially developing another clot, signs and symptoms to look for, and traveling long distances patient is aware of getting out walking stretching her legs increasing her circulation frequently during long road trips.  Patient will follow back up in 6 months or sooner if needed.    Smoking status: Former quit in 1988  Vaccination status: Up to date  Medications personally reviewed    Follow Up  Return in about 6 months (around 7/10/2023).    Patient was given instructions and counseling regarding her condition or for health maintenance advice. Please see specific information pulled into the AVS if appropriate.

## 2023-01-10 ENCOUNTER — OFFICE VISIT (OUTPATIENT)
Dept: PULMONOLOGY | Facility: CLINIC | Age: 68
End: 2023-01-10
Payer: MEDICARE

## 2023-01-10 VITALS
OXYGEN SATURATION: 98 % | WEIGHT: 196.2 LBS | SYSTOLIC BLOOD PRESSURE: 163 MMHG | HEIGHT: 67 IN | TEMPERATURE: 99.1 F | DIASTOLIC BLOOD PRESSURE: 76 MMHG | HEART RATE: 69 BPM | BODY MASS INDEX: 30.79 KG/M2 | RESPIRATION RATE: 18 BRPM

## 2023-01-10 DIAGNOSIS — I82.492 ACUTE DEEP VEIN THROMBOSIS (DVT) OF OTHER SPECIFIED VEIN OF LEFT LOWER EXTREMITY: ICD-10-CM

## 2023-01-10 DIAGNOSIS — M10.9 ACUTE GOUT INVOLVING TOE OF RIGHT FOOT, UNSPECIFIED CAUSE: ICD-10-CM

## 2023-01-10 DIAGNOSIS — I27.82 CHRONIC PULMONARY EMBOLISM, UNSPECIFIED PULMONARY EMBOLISM TYPE, UNSPECIFIED WHETHER ACUTE COR PULMONALE PRESENT: Primary | ICD-10-CM

## 2023-01-10 PROCEDURE — 99213 OFFICE O/P EST LOW 20 MIN: CPT | Performed by: NURSE PRACTITIONER

## 2023-01-10 RX ORDER — PREDNISONE 10 MG/1
10 TABLET ORAL 3 TIMES DAILY
COMMUNITY
Start: 2022-12-12 | End: 2023-01-31

## 2023-01-10 RX ORDER — POTASSIUM CHLORIDE 1500 MG/1
20 TABLET, FILM COATED, EXTENDED RELEASE ORAL DAILY
COMMUNITY
Start: 2022-11-11

## 2023-01-31 ENCOUNTER — OFFICE VISIT (OUTPATIENT)
Dept: GASTROENTEROLOGY | Facility: CLINIC | Age: 68
End: 2023-01-31
Payer: MEDICARE

## 2023-01-31 VITALS
OXYGEN SATURATION: 98 % | DIASTOLIC BLOOD PRESSURE: 74 MMHG | HEIGHT: 67 IN | WEIGHT: 197.4 LBS | TEMPERATURE: 97.8 F | BODY MASS INDEX: 30.98 KG/M2 | HEART RATE: 92 BPM | SYSTOLIC BLOOD PRESSURE: 132 MMHG

## 2023-01-31 DIAGNOSIS — Z12.11 COLON CANCER SCREENING: ICD-10-CM

## 2023-01-31 DIAGNOSIS — R13.10 DYSPHAGIA, UNSPECIFIED TYPE: Chronic | ICD-10-CM

## 2023-01-31 DIAGNOSIS — R10.11 RIGHT UPPER QUADRANT ABDOMINAL PAIN: ICD-10-CM

## 2023-01-31 DIAGNOSIS — K21.9 GASTROESOPHAGEAL REFLUX DISEASE, UNSPECIFIED WHETHER ESOPHAGITIS PRESENT: Primary | Chronic | ICD-10-CM

## 2023-01-31 DIAGNOSIS — K57.90 DIVERTICULOSIS: Chronic | ICD-10-CM

## 2023-01-31 DIAGNOSIS — Z86.010 HX OF COLONIC POLYPS: ICD-10-CM

## 2023-01-31 PROCEDURE — 99214 OFFICE O/P EST MOD 30 MIN: CPT | Performed by: NURSE PRACTITIONER

## 2023-01-31 RX ORDER — COLCHICINE 0.6 MG/1
1 TABLET ORAL DAILY
COMMUNITY
Start: 2023-01-11

## 2023-01-31 RX ORDER — ALLOPURINOL 100 MG/1
TABLET ORAL
COMMUNITY
Start: 2023-01-11

## 2023-01-31 NOTE — PROGRESS NOTES
"Chief Complaint   Patient presents with   • Abdominal Pain   • Difficulty Swallowing         History of Present Illness  67-year-old female presents the office today for follow-up with reports of painful bowel movements.  She was last seen in office on 8/16/2022.  She has a history of GERD, dysphagia, IBS-D.   Previous esophageal manometry demonstrated incomplete LES relaxation, impaired contraction propagation with impaired peristalsis and bolus transfer of liquids and large peristaltic breaks.  Last EGD was performed on 11/21/2022 revealing gastritis, fundic gland polyps, and she underwent an empiric esophageal dilation to 18 mm.  Pathology demonstrated mild chronic inflammation, consistent with chemical gastritis.    Continues omeprazole 40 mg daily. She reports dysphagia. She has more difficulty with liquids, not foods. She will start having \"deep hiccups\" and when that occurs she will take hyoscyamine and symptoms are relieved.  She reports having to take hyoscyamine 7-8 x in the past week, which is significantly increased over how often she takes the medication.     She reports having some pain in her RUQ last Saturday that she felt was a gallbladder attack. She took 3 hyoscyamine tablets which helped. It took about 15 minutes for the discomfort to resolve. She had not eaten prior to the incident.     Has a history of DVTs and takes Eliquis.  She also has a history of chronic kidney disease with elevated creatinine. She reports recently starting two medications for gout in the past month, which has caused her intermittent diarrhea. She is having normal stools at times also. She takes a daily probiotic.     Last colonoscopy was performed on 3/12/2021 revealing nonbleeding internal hemorrhoids, diverticulosis, and 1 colon polyp.     Result Review :       Office Visit with Latonia Carbone APRN (08/16/2022)  UPPER GI ENDOSCOPY (11/21/2022 09:57)  COLONOSCOPY (03/12/2021 07:16)  Tissue Pathology Exam (11/21/2022 " "10:09)    Vital Signs:   /74   Pulse 92   Temp 97.8 °F (36.6 °C)   Ht 170.2 cm (67\")   Wt 89.5 kg (197 lb 6.4 oz)   SpO2 98%   BMI 30.92 kg/m²     Body mass index is 30.92 kg/m².     Review of systems:    + right upper quadrant abdominal pain  + Dysphagia  + Diarrhea     Physical Exam  Vitals reviewed.   Constitutional:       Appearance: Normal appearance.   Pulmonary:      Effort: Pulmonary effort is normal. No respiratory distress.   Abdominal:      General: Abdomen is flat. Bowel sounds are normal. There is no distension.      Palpations: Abdomen is soft. There is no mass.      Tenderness: There is no abdominal tenderness. There is no guarding.   Musculoskeletal:         General: Normal range of motion.   Skin:     General: Skin is warm and dry.   Neurological:      General: No focal deficit present.      Mental Status: She is alert and oriented to person, place, and time.   Psychiatric:         Mood and Affect: Mood normal.         Behavior: Behavior normal.         Thought Content: Thought content normal.         Judgment: Judgment normal.       Assessment and Plan    Diagnoses and all orders for this visit:    1. Gastroesophageal reflux disease, unspecified whether esophagitis present (Primary)    2. Right upper quadrant abdominal pain  Comments:  New symptom  Orders:  -     US Abdomen Limited; Future  -     NM HIDA SCAN WITH PHARMACOLOGICAL INTERVENTION; Future    3. Dysphagia, unspecified type    4. Diverticulosis    5. Hx of colonic polyps    6. Colon cancer screening           Patient Instructions   1.  For further evaluation of right upper quadrant abdominal pain we will order an ultrasound and HIDA scan. You will be contacted to schedule these imaging tests.    2.   For hiccups and dysphagia you may continue to use hyoscyamine as prescribed.    3.  For GERD, continues omeprazole 40 mg daily.    4.  Continue daily probiotic.     5.   We recommend you contact your rheumatologist regarding your " new medications for gout if your diarrhea continues to persist. You can obtain relief from diarrhea with use of hyoscyamine as well.     6.  Next office follow up dependent upon ultrasound and HIDA scan and symptoms.      Discussion:    Patient feels that her gallbladder could be the etiology behind some of her symptoms, particular her right upper quadrant abdominal pain that occurred this past weekend.  We will proceed with HIDA scan and ultrasound for further evaluation.    For dysphagia, patient may continue to use hyoscyamine as this works well to manage symptoms.  Patient was also advised to use hyoscyamine, as this could help with abdominal discomfort as well as her intermittent diarrhea.  She was recently placed on both allopurinol and colchicine for new diagnosis of gout in her right great toe.  The medication is worked well to manage her gout symptoms, but she feels that she is suffering from diarrhea secondary to medication.  APRN advised patient to contact her rheumatologist to discuss further if her diarrhea persists.  Next office follow-up to be determined based upon imaging results and symptoms.  Patient verbalized understand above plan of care and is in agreement.  All questions answered and support provided.    EMR Dragon/Transcription Disclaimer:  This document has been Dictated utilizing Dragon dictation.

## 2023-01-31 NOTE — PATIENT INSTRUCTIONS
For further evaluation of right upper quadrant abdominal pain we will order an ultrasound and HIDA scan. You will be contacted to schedule these imaging tests.    2.   For hiccups and dysphagia you may continue to use hyoscyamine as prescribed.    3.  For GERD, continues omeprazole 40 mg daily.    4.  Continue daily probiotic.     5.   We recommend you contact your rheumatologist regarding your new medications for gout if your diarrhea continues to persist. You can obtain relief from diarrhea with use of hyoscyamine as well.     6.  Next office follow up dependent upon ultrasound and HIDA scan and symptoms.

## 2023-02-20 ENCOUNTER — TRANSCRIBE ORDERS (OUTPATIENT)
Dept: GENERAL RADIOLOGY | Facility: HOSPITAL | Age: 68
End: 2023-02-20
Payer: MEDICARE

## 2023-02-20 DIAGNOSIS — R79.89 ELEVATED SERUM CREATININE: Primary | ICD-10-CM

## 2023-02-22 ENCOUNTER — HOSPITAL ENCOUNTER (OUTPATIENT)
Dept: ULTRASOUND IMAGING | Facility: HOSPITAL | Age: 68
End: 2023-02-22
Payer: MEDICARE

## 2023-03-01 ENCOUNTER — HOSPITAL ENCOUNTER (OUTPATIENT)
Dept: NUCLEAR MEDICINE | Facility: HOSPITAL | Age: 68
Discharge: HOME | End: 2023-03-01
Payer: MEDICARE

## 2023-03-01 ENCOUNTER — HOSPITAL ENCOUNTER (OUTPATIENT)
Dept: ULTRASOUND IMAGING | Facility: HOSPITAL | Age: 68
Discharge: HOME | End: 2023-03-01
Admitting: NURSE PRACTITIONER
Payer: MEDICARE

## 2023-03-01 DIAGNOSIS — K76.0 FATTY LIVER: Primary | ICD-10-CM

## 2023-03-01 DIAGNOSIS — R10.11 RIGHT UPPER QUADRANT ABDOMINAL PAIN: ICD-10-CM

## 2023-03-01 PROCEDURE — A9537 TC99M MEBROFENIN: HCPCS | Performed by: NURSE PRACTITIONER

## 2023-03-01 PROCEDURE — 76705 ECHO EXAM OF ABDOMEN: CPT

## 2023-03-01 PROCEDURE — 25010000002 SINCALIDE PER 5 MCG: Performed by: NURSE PRACTITIONER

## 2023-03-01 PROCEDURE — 78227 HEPATOBIL SYST IMAGE W/DRUG: CPT

## 2023-03-01 PROCEDURE — 0 TECHNETIUM TC 99M MEBROFENIN KIT: Performed by: NURSE PRACTITIONER

## 2023-03-01 RX ORDER — KIT FOR THE PREPARATION OF TECHNETIUM TC 99M MEBROFENIN 45 MG/10ML
1 INJECTION, POWDER, LYOPHILIZED, FOR SOLUTION INTRAVENOUS
Status: COMPLETED | OUTPATIENT
Start: 2023-03-01 | End: 2023-03-01

## 2023-03-01 RX ADMIN — SODIUM CHLORIDE 1.8 MCG: 9 INJECTION, SOLUTION INTRAVENOUS at 09:16

## 2023-03-01 RX ADMIN — MEBROFENIN 1 DOSE: 45 INJECTION, POWDER, LYOPHILIZED, FOR SOLUTION INTRAVENOUS at 08:04

## 2023-03-02 ENCOUNTER — HOSPITAL ENCOUNTER (OUTPATIENT)
Dept: CT IMAGING | Facility: HOSPITAL | Age: 68
Discharge: HOME | End: 2023-03-02
Payer: MEDICARE

## 2023-03-07 ENCOUNTER — HOSPITAL ENCOUNTER (OUTPATIENT)
Dept: CT IMAGING | Facility: HOSPITAL | Age: 68
Discharge: HOME | End: 2023-03-07
Admitting: RADIOLOGY
Payer: MEDICARE

## 2023-03-07 VITALS
RESPIRATION RATE: 15 BRPM | DIASTOLIC BLOOD PRESSURE: 48 MMHG | HEART RATE: 80 BPM | SYSTOLIC BLOOD PRESSURE: 121 MMHG | TEMPERATURE: 97.7 F | WEIGHT: 192 LBS | BODY MASS INDEX: 30.13 KG/M2 | HEIGHT: 67 IN | OXYGEN SATURATION: 96 %

## 2023-03-07 DIAGNOSIS — R79.89 ELEVATED SERUM CREATININE: ICD-10-CM

## 2023-03-07 LAB
INR PPP: 1.1 (ref 0.8–1.2)
PLATELET # BLD AUTO: 238 10*3/MM3 (ref 140–450)
PROTHROMBIN TIME: 13.5 SECONDS (ref 12.8–15.2)

## 2023-03-07 PROCEDURE — 77012 CT SCAN FOR NEEDLE BIOPSY: CPT

## 2023-03-07 PROCEDURE — 88346 IMFLUOR 1ST 1ANTB STAIN PX: CPT

## 2023-03-07 PROCEDURE — 99152 MOD SED SAME PHYS/QHP 5/>YRS: CPT

## 2023-03-07 PROCEDURE — 88350 IMFLUOR EA ADDL 1ANTB STN PX: CPT

## 2023-03-07 PROCEDURE — 25010000002 FENTANYL CITRATE (PF) 50 MCG/ML SOLUTION: Performed by: RADIOLOGY

## 2023-03-07 PROCEDURE — 88313 SPECIAL STAINS GROUP 2: CPT

## 2023-03-07 PROCEDURE — 85049 AUTOMATED PLATELET COUNT: CPT | Performed by: RADIOLOGY

## 2023-03-07 PROCEDURE — 25010000002 MIDAZOLAM PER 1 MG: Performed by: RADIOLOGY

## 2023-03-07 PROCEDURE — 0 LIDOCAINE 1 % SOLUTION: Performed by: INTERNAL MEDICINE

## 2023-03-07 PROCEDURE — 88305 TISSUE EXAM BY PATHOLOGIST: CPT

## 2023-03-07 PROCEDURE — 85610 PROTHROMBIN TIME: CPT

## 2023-03-07 PROCEDURE — 88348 ELECTRON MICROSCOPY DX: CPT

## 2023-03-07 PROCEDURE — 88300 SURGICAL PATH GROSS: CPT | Performed by: INTERNAL MEDICINE

## 2023-03-07 RX ORDER — LIDOCAINE HYDROCHLORIDE 10 MG/ML
20 INJECTION, SOLUTION INFILTRATION; PERINEURAL ONCE
Status: COMPLETED | OUTPATIENT
Start: 2023-03-07 | End: 2023-03-07

## 2023-03-07 RX ORDER — SODIUM CHLORIDE 0.9 % (FLUSH) 0.9 %
3 SYRINGE (ML) INJECTION EVERY 12 HOURS SCHEDULED
Status: DISCONTINUED | OUTPATIENT
Start: 2023-03-07 | End: 2023-03-08 | Stop reason: HOSPADM

## 2023-03-07 RX ORDER — SODIUM CHLORIDE 0.9 % (FLUSH) 0.9 %
10 SYRINGE (ML) INJECTION AS NEEDED
Status: DISCONTINUED | OUTPATIENT
Start: 2023-03-07 | End: 2023-03-08 | Stop reason: HOSPADM

## 2023-03-07 RX ORDER — MIDAZOLAM HYDROCHLORIDE 1 MG/ML
0.5 INJECTION INTRAMUSCULAR; INTRAVENOUS ONCE AS NEEDED
Status: COMPLETED | OUTPATIENT
Start: 2023-03-07 | End: 2023-03-07

## 2023-03-07 RX ORDER — PREDNISONE 20 MG/1
TABLET ORAL
COMMUNITY
Start: 2023-01-11

## 2023-03-07 RX ORDER — FENTANYL CITRATE 50 UG/ML
INJECTION, SOLUTION INTRAMUSCULAR; INTRAVENOUS AS NEEDED
Status: COMPLETED | OUTPATIENT
Start: 2023-03-07 | End: 2023-03-07

## 2023-03-07 RX ORDER — SODIUM CHLORIDE 9 MG/ML
25 INJECTION, SOLUTION INTRAVENOUS ONCE
Status: COMPLETED | OUTPATIENT
Start: 2023-03-07 | End: 2023-03-07

## 2023-03-07 RX ORDER — MIDAZOLAM HYDROCHLORIDE 1 MG/ML
INJECTION INTRAMUSCULAR; INTRAVENOUS AS NEEDED
Status: COMPLETED | OUTPATIENT
Start: 2023-03-07 | End: 2023-03-07

## 2023-03-07 RX ADMIN — MIDAZOLAM 1 MG: 1 INJECTION INTRAMUSCULAR; INTRAVENOUS at 13:21

## 2023-03-07 RX ADMIN — MIDAZOLAM 0.5 MG: 1 INJECTION INTRAMUSCULAR; INTRAVENOUS at 12:25

## 2023-03-07 RX ADMIN — Medication 3 ML: at 13:05

## 2023-03-07 RX ADMIN — FENTANYL CITRATE 50 MCG: 50 INJECTION, SOLUTION INTRAMUSCULAR; INTRAVENOUS at 13:21

## 2023-03-07 RX ADMIN — SODIUM CHLORIDE 25 ML/HR: 9 INJECTION, SOLUTION INTRAVENOUS at 13:05

## 2023-03-07 RX ADMIN — LIDOCAINE HYDROCHLORIDE 20 ML: 10 INJECTION, SOLUTION INFILTRATION; PERINEURAL at 13:22

## 2023-03-07 NOTE — POST-PROCEDURE NOTE
POST PROCEDURE NOTE    Procedure: ct right kidney biopsy    Pre-Procedure Diagnosis: elevated creatinine    Post-procedure Diagnosis: same    Findings: technically successful ct guided right kidney biopsy    Complications: no immediate    Blood loss: none    Specimen Removed: two 18 gauge cores    Disposition:   Discharge home

## 2023-03-07 NOTE — NURSING NOTE
Patient arrived in Radiology triage for  kidney biopsy bay 7.  Protective goggles and mask in place with all patient interactions today

## 2023-03-07 NOTE — NURSING NOTE
Patient taken to patient discharge area via wheelchair. Daughter  driving patient home.  Protective goggles and mask in place with all patient interactions today .   Normal spontaneous vaginal delivery

## 2023-03-07 NOTE — DISCHARGE INSTRUCTIONS

## 2023-03-09 LAB
LAB AP CASE REPORT: NORMAL
LAB AP CLINICAL INFORMATION: NORMAL
PATH REPORT.ADDENDUM SPEC: NORMAL
PATH REPORT.FINAL DX SPEC: NORMAL
PATH REPORT.GROSS SPEC: NORMAL

## 2023-05-07 ENCOUNTER — APPOINTMENT (OUTPATIENT)
Dept: ULTRASOUND IMAGING | Facility: HOSPITAL | Age: 68
End: 2023-05-07
Payer: MEDICARE

## 2023-05-07 ENCOUNTER — HOSPITAL ENCOUNTER (OUTPATIENT)
Facility: HOSPITAL | Age: 68
Setting detail: OBSERVATION
Discharge: HOME | End: 2023-05-09
Attending: STUDENT IN AN ORGANIZED HEALTH CARE EDUCATION/TRAINING PROGRAM | Admitting: STUDENT IN AN ORGANIZED HEALTH CARE EDUCATION/TRAINING PROGRAM
Payer: MEDICARE

## 2023-05-07 DIAGNOSIS — I26.99 OTHER ACUTE PULMONARY EMBOLISM, UNSPECIFIED WHETHER ACUTE COR PULMONALE PRESENT: ICD-10-CM

## 2023-05-07 DIAGNOSIS — I82.492 ACUTE DEEP VEIN THROMBOSIS (DVT) OF OTHER SPECIFIED VEIN OF LEFT LOWER EXTREMITY: ICD-10-CM

## 2023-05-07 PROBLEM — R55 SYNCOPE: Status: ACTIVE | Noted: 2023-05-07

## 2023-05-07 PROBLEM — Z86.718 HISTORY OF DVT (DEEP VEIN THROMBOSIS): Status: ACTIVE | Noted: 2023-05-07

## 2023-05-07 PROBLEM — K92.1 HEMATOCHEZIA: Status: ACTIVE | Noted: 2023-05-07

## 2023-05-07 PROBLEM — M10.9 GOUT: Status: ACTIVE | Noted: 2023-05-07

## 2023-05-07 PROBLEM — N17.9 AKI (ACUTE KIDNEY INJURY): Status: ACTIVE | Noted: 2023-05-07

## 2023-05-07 PROBLEM — Z86.711 HISTORY OF PULMONARY EMBOLISM: Status: ACTIVE | Noted: 2023-05-07

## 2023-05-07 PROBLEM — S01.119A EYEBROW LACERATION: Status: ACTIVE | Noted: 2023-05-07

## 2023-05-07 LAB
BILIRUB UR QL STRIP: NEGATIVE
CLARITY UR: CLEAR
COLOR UR: YELLOW
CREAT UR-MCNC: 179.7 MG/DL
GLUCOSE UR STRIP-MCNC: NEGATIVE MG/DL
HCT VFR BLD AUTO: 28.2 % (ref 34–46.6)
HGB BLD-MCNC: 9.4 G/DL (ref 12–15.9)
HGB UR QL STRIP.AUTO: NEGATIVE
KETONES UR QL STRIP: ABNORMAL
LEUKOCYTE ESTERASE UR QL STRIP.AUTO: NEGATIVE
NITRITE UR QL STRIP: NEGATIVE
PH UR STRIP.AUTO: 5.5 [PH] (ref 5–8)
PROT UR QL STRIP: NEGATIVE
SODIUM UR-SCNC: 40 MMOL/L
SP GR UR STRIP: 1.02 (ref 1–1.03)
UROBILINOGEN UR QL STRIP: ABNORMAL

## 2023-05-07 PROCEDURE — 81003 URINALYSIS AUTO W/O SCOPE: CPT | Performed by: INTERNAL MEDICINE

## 2023-05-07 PROCEDURE — 85018 HEMOGLOBIN: CPT | Performed by: STUDENT IN AN ORGANIZED HEALTH CARE EDUCATION/TRAINING PROGRAM

## 2023-05-07 PROCEDURE — G0378 HOSPITAL OBSERVATION PER HR: HCPCS

## 2023-05-07 PROCEDURE — 85014 HEMATOCRIT: CPT | Performed by: STUDENT IN AN ORGANIZED HEALTH CARE EDUCATION/TRAINING PROGRAM

## 2023-05-07 PROCEDURE — 96361 HYDRATE IV INFUSION ADD-ON: CPT

## 2023-05-07 PROCEDURE — 82570 ASSAY OF URINE CREATININE: CPT | Performed by: INTERNAL MEDICINE

## 2023-05-07 PROCEDURE — G0379 DIRECT REFER HOSPITAL OBSERV: HCPCS

## 2023-05-07 PROCEDURE — 76775 US EXAM ABDO BACK WALL LIM: CPT

## 2023-05-07 PROCEDURE — 84300 ASSAY OF URINE SODIUM: CPT | Performed by: STUDENT IN AN ORGANIZED HEALTH CARE EDUCATION/TRAINING PROGRAM

## 2023-05-07 RX ORDER — ACETAMINOPHEN 325 MG/1
650 TABLET ORAL EVERY 4 HOURS PRN
Status: DISCONTINUED | OUTPATIENT
Start: 2023-05-07 | End: 2023-05-09 | Stop reason: HOSPADM

## 2023-05-07 RX ORDER — METOPROLOL SUCCINATE 25 MG/1
25 TABLET, EXTENDED RELEASE ORAL DAILY
Status: DISCONTINUED | OUTPATIENT
Start: 2023-05-07 | End: 2023-05-09

## 2023-05-07 RX ORDER — SODIUM CHLORIDE 9 MG/ML
100 INJECTION, SOLUTION INTRAVENOUS CONTINUOUS
Status: DISCONTINUED | OUTPATIENT
Start: 2023-05-07 | End: 2023-05-08

## 2023-05-07 RX ORDER — ONDANSETRON 4 MG/1
4 TABLET, FILM COATED ORAL EVERY 6 HOURS PRN
Status: DISCONTINUED | OUTPATIENT
Start: 2023-05-07 | End: 2023-05-09 | Stop reason: HOSPADM

## 2023-05-07 RX ORDER — PANTOPRAZOLE SODIUM 40 MG/1
40 TABLET, DELAYED RELEASE ORAL
Status: DISCONTINUED | OUTPATIENT
Start: 2023-05-08 | End: 2023-05-08

## 2023-05-07 RX ORDER — FLUTICASONE PROPIONATE 50 MCG
2 SPRAY, SUSPENSION (ML) NASAL DAILY
Status: DISCONTINUED | OUTPATIENT
Start: 2023-05-07 | End: 2023-05-09 | Stop reason: HOSPADM

## 2023-05-07 RX ORDER — ATORVASTATIN CALCIUM 20 MG/1
10 TABLET, FILM COATED ORAL DAILY
Status: DISCONTINUED | OUTPATIENT
Start: 2023-05-07 | End: 2023-05-09 | Stop reason: HOSPADM

## 2023-05-07 RX ORDER — UREA 10 %
3 LOTION (ML) TOPICAL NIGHTLY PRN
Status: DISCONTINUED | OUTPATIENT
Start: 2023-05-07 | End: 2023-05-09 | Stop reason: HOSPADM

## 2023-05-07 RX ORDER — ONDANSETRON 2 MG/ML
4 INJECTION INTRAMUSCULAR; INTRAVENOUS EVERY 6 HOURS PRN
Status: DISCONTINUED | OUTPATIENT
Start: 2023-05-07 | End: 2023-05-09 | Stop reason: HOSPADM

## 2023-05-07 RX ADMIN — SODIUM CHLORIDE 100 ML/HR: 9 INJECTION, SOLUTION INTRAVENOUS at 16:21

## 2023-05-07 RX ADMIN — ATORVASTATIN CALCIUM 10 MG: 20 TABLET, FILM COATED ORAL at 16:19

## 2023-05-07 RX ADMIN — METOPROLOL SUCCINATE 25 MG: 25 TABLET, EXTENDED RELEASE ORAL at 16:19

## 2023-05-07 RX ADMIN — ACETAMINOPHEN 650 MG: 325 TABLET, FILM COATED ORAL at 14:53

## 2023-05-07 NOTE — H&P
Patient Name:  Patsy Tobin  YOB: 1955  MRN:  [id number]  Admit Date:  5/7/2023  Patient Care Team:  Annetta Austin APRN as PCP - General (Nurse Practitioner)  Kali Roblero MD as Referring Physician (Vascular Surgery)  Jerry Siddiqui MD as Consulting Physician (Hematology and Oncology)  Tito Moreno MD as Consulting Physician (Obstetrics and Gynecology)  Latonia Carbone APRN as Nurse Practitioner (Gastroenterology)  Rajesh Loco MD as Consulting Physician (Gastroenterology)  Pauly Lobo APRN as Nurse Practitioner (Nurse Practitioner)      Subjective   History Present Illness     No chief complaint on file.    Ms. Tobin is a 67 y.o. woman with a history of pulmonary embolism and left DVT on Eliquis, rheumatoid arthritis on Enbrel, gout, GERD, dysphagia, IBS-D, gout, essential hypertension, hyperlipidemia, CKD 3B/4 with biopsy demonstrating arterionephrosclerosis and chronic interstitial nephritis who presents following a syncopal episode at home.  She reports that early this morning, she was having abdominal cramping which woke her from sleep, and she went to defecate.  After doing so, she experienced some nausea and she thought she was going to throw up, but the next thing she remembers is waking up on the floor.  Since then, she has had several more episodes of diarrhea, including at the outside ED for which she was transferred.  She says that on the last couple of bowel movements she noticed some small amounts of bright red blood which she thinks might be related to a previous hernia and wiping.     In the emergency department at the outside facility, she was found to have a laceration over her left eyebrow, which was repaired.  She underwent head CT, CT facial bones, cervical CT all of which showed no acute fracture or subluxation or intracranial abnormality.  Her creatinine was found to be elevated at 3.0  Her last creatinine was 1.7 on March 29, 2023.   The remainder of her CMP was normal.  Her CBC only showed mild chronic anemia which was at her baseline at 11.0.  Her troponin was normal as well.    History of Present Illness  Review of Systems   Constitutional: Negative for chills, diaphoresis, fatigue and fever.   HENT: Positive for postnasal drip. Negative for rhinorrhea and sinus pain.    Eyes: Negative.    Respiratory: Negative for cough and shortness of breath.    Cardiovascular: Negative for chest pain, palpitations and leg swelling.   Gastrointestinal: Positive for anal bleeding, diarrhea, nausea and vomiting. Negative for abdominal pain, blood in stool and constipation.   Endocrine: Negative.    Genitourinary: Negative for dysuria, flank pain, frequency and urgency.   Musculoskeletal: Negative for arthralgias and myalgias.   Skin: Negative for rash and wound.   Allergic/Immunologic: Negative.    Neurological: Positive for syncope. Negative for light-headedness and headaches.   Hematological: Negative.    Psychiatric/Behavioral: Negative for confusion and decreased concentration.        Personal History     Past Medical History:   Diagnosis Date   • Abdominal pain of multiple sites    • NAKIA (acute kidney injury) 5/7/2023   • Allergic rhinitis    • AMEYA positive    • Anemia    • Blurred vision, bilateral    • Deep vein thrombosis    • Depression    • Diarrhea    • Esophagitis 2016   • Fatigue    • Gastric ulcer    • GERD (gastroesophageal reflux disease)    • Headache    • High blood pressure    • Hyperlipidemia    • Hypertension    • Leg cramps    • Leg wound, right    • Mitral valve prolapse    • Multiple joint pain    • Overweight    • Peptic ulceration    • PONV (postoperative nausea and vomiting)    • Pulmonary embolism 11/25/2021   • RA (rheumatoid arthritis)    • Superficial phlebitis     in left ankle    • Tattoos     permanent makeup      Past Surgical History:   Procedure Laterality Date   • ABDOMINOPLASTY N/A 12/02/2014    Due to appendectomy site  having staph infection-Dr. Keagan Yip   • APPENDECTOMY N/A     Dr. Henry   • COLONOSCOPY      [place]   • COLONOSCOPY N/A 2021    Procedure: COLONOSCOPY;  Surgeon: Rajesh Loco MD;  [place] [facility] [place]  Service: Gastroenterology;  Laterality: N/A;   • ELBOW ARTHROPLASTY     • ENDOSCOPY N/A     Dr. Rajesh Loco    • ENDOSCOPY N/A 2021    Procedure: ESOPHAGOGASTRODUODENOSCOPY;  Surgeon: Rajesh Loco MD;  [place] [facility] [place];  Service: Gastroenterology;  Laterality: N/A;   • ENDOSCOPY N/A 2022    Procedure: ESOPHAGOGASTRODUODENOSCOPY WITH BIOPSY AND DILATION;  Surgeon: Rajesh Loco MD;  [place] [facility] [place];  Service: Gastroenterology;  Laterality: N/A;  mild gastritis, gastric polyp  15 - 18 mm balloon used.   • FLEXIBLE SIGMOIDOSCOPY N/A     Dr. Rajesh Loco   • HEMORRHOIDECTOMY N/A     Dr. Saba   • HIP BIPOLAR REPLACEMENT     • KNEE ARTHROSCOPY W/ MENISCECTOMY Left     Dr. Rice   • KNEE MENISCECTOMY Right     Dr. Rice   • SUBTOTAL HYSTERECTOMY Bilateral     Dr. Sanford. Not due to cancer. Robotic-assisted    • TUBAL ABDOMINAL LIGATION Bilateral    • UPPER GASTROINTESTINAL ENDOSCOPY N/A 10/20/2016    UGI with biopsy. Normal esophagus: injected with botulinum toxin, Erythematous mucoas in the antrum: biopsied, normal duodenum-Dr. Rajesh Loco     Family History   Problem Relation Age of Onset   • Heart failure Mother    • Hypertension Mother    • Cancer Father         kidney   • Colon polyps Brother         Brother   • Cancer Brother         Lung and Liver Cancer   • Diabetes Maternal Grandmother    • Colon cancer Neg Hx    • Crohn's disease Neg Hx    • Irritable bowel syndrome Neg Hx    • Ulcerative colitis Neg Hx      Social History     Tobacco Use   • Smoking status: Former     Packs/day: 1.00     Years: 22.00     Pack years: 22.00     Types: Cigarettes     Quit date: 1988     Years since quittin.3   • Smokeless  tobacco: Never   Vaping Use   • Vaping Use: Never used   Substance Use Topics   • Alcohol use: Yes     Comment: Social drinker, do not drink everyday   • Drug use: No     No current facility-administered medications on file prior to encounter.     Current Outpatient Medications on File Prior to Encounter   Medication Sig Dispense Refill   • allopurinol (ZYLOPRIM) 100 MG tablet Two tablets daily     • apixaban (ELIQUIS) 5 MG tablet tablet Take 1 tablet twice a day. 60 tablet 11   • aspirin 81 MG chewable tablet Chew 1 tablet Daily.     • cetirizine (zyrTEC) 10 MG tablet TAKE 1 TABLET BY MOUTH IN THE MORNING AS NEEDED     • colchicine 0.6 MG tablet Take 1 tablet by mouth Daily.     • etanercept (ENBREL) 50 MG/ML solution prefilled syringe injection Inject 1 mL under the skin into the appropriate area as directed 1 (One) Time Per Week.     • Fiber Adult Gummies 2 g chewable tablet Chew.     • fluticasone (FLONASE) 50 MCG/ACT nasal spray 2 sprays into the nostril(s) as directed by provider Daily.     • furosemide (LASIX) 20 MG tablet 1 tablet Daily.     • LACTOBACILLUS PO Take  by mouth Daily.     • lisinopril (PRINIVIL,ZESTRIL) 10 MG tablet Take 1 tablet by mouth Daily.     • lovastatin (MEVACOR) 20 MG tablet Take 1 tablet by mouth Every Night.     • metoprolol succinate XL (TOPROL-XL) 25 MG 24 hr tablet Take 1 tablet by mouth once daily 90 tablet 3   • omeprazole (priLOSEC) 40 MG capsule Take 1 capsule by mouth Daily. 90 capsule 3   • potassium chloride ER (K-TAB) 20 MEQ tablet controlled-release ER tablet Take 1 tablet by mouth Daily.     • Semaglutide,0.25 or 0.5MG/DOS, (OZEMPIC) 2 MG/3ML solution pen-injector Inject 0.25 mg under the skin into the appropriate area as directed 1 (One) Time Per Week.     • alosetron (LOTRONEX) 0.5 MG tablet Take 1 tablet by mouth 2 (Two) Times a Day.     • predniSONE (DELTASONE) 20 MG tablet Only in case of future gout flare, at first sign of flare, take 1 tablet daily for 3  consecutive days       Allergies   Allergen Reactions   • Celebrex [Celecoxib] Other (See Comments)     Passed Out    • Arava [Leflunomide] Other (See Comments)     Altered Glucose Levels   • Duloxetine Hallucinations   • Naproxen GI Intolerance     All NSAIDS    • Nsaids GI Intolerance     Other reaction(s): Stomach upset     • Plaquenil [Hydroxychloroquine Sulfate] Mental Status Change       Objective    Objective     Vital Signs  Temp:  [97.8 °F (36.6 °C)] 97.8 °F (36.6 °C)  Heart Rate:  [69] 69  Resp:  [16] 16  BP: (141)/(62) 141/62  SpO2:  [99 %] 99 %  on   ;   Device (Oxygen Therapy): room air  There is no height or weight on file to calculate BMI.    Physical Exam  Vitals and nursing note reviewed.   Constitutional:       General: She is not in acute distress.     Appearance: She is not toxic-appearing.   HENT:      Head: Normocephalic.      Comments: Laceration above left eyebrow     Mouth/Throat:      Mouth: Mucous membranes are moist.      Pharynx: Oropharynx is clear. No oropharyngeal exudate.   Eyes:      Extraocular Movements: Extraocular movements intact.      Conjunctiva/sclera: Conjunctivae normal.      Pupils: Pupils are equal, round, and reactive to light.   Cardiovascular:      Rate and Rhythm: Normal rate and regular rhythm.      Heart sounds: Normal heart sounds.   Pulmonary:      Effort: Pulmonary effort is normal. No respiratory distress.      Breath sounds: Normal breath sounds. No wheezing, rhonchi or rales.   Abdominal:      General: Bowel sounds are normal. There is no distension.      Palpations: Abdomen is soft.      Tenderness: There is no abdominal tenderness. There is no guarding or rebound.   Musculoskeletal:         General: No tenderness.      Cervical back: Normal range of motion and neck supple.      Right lower leg: No edema.      Left lower leg: No edema.   Skin:     General: Skin is warm and dry.      Findings: No erythema or rash.   Neurological:      General: No focal  deficit present.      Mental Status: She is alert and oriented to person, place, and time.   Psychiatric:         Mood and Affect: Mood normal.         Behavior: Behavior normal.         Results Review:  I reviewed the patient's new clinical results.  I reviewed the patient's new imaging results and agree with the interpretation.  I reviewed the patient's other test results and agree with the interpretation  I personally viewed and interpreted the patient's EKG/Telemetry data  Discussed with ED provider.    Lab Results (last 24 hours)     Procedure Component Value Units Date/Time    COMPREHENSIVE METABOLIC PANEL [[id number]]  (Abnormal) Collected: 05/07/23 0934     Updated: 05/07/23 1418    CBC AND DIFFERENTIAL [[id number]]  (Abnormal) Collected: 05/07/23 0934     Updated: 05/07/23 1418    AUTODIFF [[id number]]  (Abnormal) Collected: 05/07/23 0934    Specimen: Blood Updated: 05/07/23 1418     Neutrophil % 76.8 %      Lymphocyte % 18.9 %      Monocyte % 2.5 %      Eosinophil % 1.1 %      Basophil % 0.7 %      Neutrophils Absolute 4.6 x10(3)/ul      Lymphocytes Absolute 1.1 x10(3)/ul      Monocytes Absolute 0.2 x10(3)/ul      Eosinophils Absolute 0.1 x10(3)/ul      Basophils Absolute 0.0 x10(3)/ul     Narrative:       Ordered by Discern Expert.    TROPONIN I, HIGH SENSITIVE [[id number]] Collected: 05/07/23 0934     Updated: 05/07/23 1418          Imaging Results (Last 24 Hours)     Procedure Component Value Units Date/Time    US Renal Bilateral [[id number]] Resulted: 05/07/23 1515     Updated: 05/07/23 1515          Results for orders placed in visit on 07/13/22    Adult Transthoracic Echo Complete W/ Cont if Necessary Per Protocol    Interpretation Summary  · Calculated left ventricular EF = 59% Estimated left ventricular EF was in agreement with the calculated left ventricular EF.  · Left ventricular diastolic function is consistent with (grade I) impaired relaxation.  · The aortic root measures 3.2 cm.  · Estimated  right ventricular systolic pressure from tricuspid regurgitation is normal (<35 mmHg).      No orders to display        Assessment/Plan     Active Hospital Problems    Diagnosis  POA   • **NAKIA (acute kidney injury) [N17.9]  Yes   • Syncope [R55]  Yes   • Eyebrow laceration [S01.119A]  Yes   • History of pulmonary embolism [Z86.711]  Yes   • History of DVT (deep vein thrombosis) [Z86.718]  Not Applicable   • Gout [M10.9]  Yes   • Hematochezia [K92.1]  Yes   • Hypertension [I10]  Yes   • Gastroesophageal reflux disease [K21.9]  Yes   • Diarrhea [R19.7]  Yes   • Right bundle-branch block [I45.10]  Yes   • Hyperlipidemia [E78.5]  Yes   • Rheumatoid arthritis [M06.9]  Yes      Resolved Hospital Problems   No resolved problems to display.       Ms. Tobin is a 67 y.o. pulmonary embolism and left DVT on Eliquis, rheumatoid arthritis on Enbrel, gout, GERD, dysphagia, IBS-D, gout, essential hypertension, hyperlipidemia, CKD 3B/4 with biopsy demonstrating arterionephrosclerosis and chronic interstitial nephritis who presents with an NAKIA after a syncopal event with associated nausea, vomiting, and diarrhea.    · NAKIA on CKD 3/4-check a postvoid residual, renal ultrasound, urine sodium. Start some gentle fluids.  Consult her nephrologist Dr. Rossi.  · Syncope-given the worsening renal function, diarrhea, vomiting, I suspect this is most likely due to dehydration.  She did have a cardiac work-up about 2 years ago with only occasional PACs and rare PVCs found on Holter monitoring, and echocardiogram at the time showed severe pulmonary hypertension, but this was resolved less than a year ago on a repeat echocardiogram.  I will check another echocardiogram here, and monitor on telemetry for now.  · Nausea/vomiting/diarrhea-small amount of hematochezia, which I suspect is due to hemorrhoids and wiping.  Her hemoglobin is at her historic baseline.  We will trend her hemoglobin and hold her Eliquis for the moment until I am convinced  that she is not having significant bleeding.  Check GI PCR and C. difficile toxin PCR.  Her abdomen is soft  · History of bilateral PEs and left DVT-hold Eliquis as above  · Rheumatoid arthritis-on Enbrel as an outpatient  · Gout-hold colchicine and allopurinol  · GERD-continue PPI  · Essential hypertension-hold her Lasix, lisinopril  · Hyperlipidemia-statin  · I discussed the patient's findings and my recommendations with patient, nursing staff and ED provider.    VTE Prophylaxis - SCDs.  Code Status - Full code.       Alverto Joseph MD  [facility]  05/07/23  15:18 EDT

## 2023-05-07 NOTE — PLAN OF CARE
Goal Outcome Evaluation:      68 yo female admitted 5/7 with NAKIA and syncope. Tx from [facility]. Renal US done, negative. Echo ordered. IV fluids initiated. Pt complains of headache related to fall. VS stable, room air, A&Ox4.

## 2023-05-07 NOTE — CONSULTS
[facility] Consult Note      Patient Name: Patsy Tobin  : 1955  MRN: [id number]  Primary Care Physician:  Annetta Austin APRN  Referring Physician: No Known Provider  Date of admission: 2023    Subjective     Reason for Consult:  NAKIA on CKD3b    HPI:   Patsy Tobin is a 67 y.o. female with CKD3b (baseline SCR 1.7, with renal biopsy---- revealing chronic tubulointerstitial disease as well as arterionephrosclerosis)) followed by Dr. Jannet Rossi of our group, admitted today following syncopal event at home.  She woke very early in the morning due to abdominal cramping, went to the bathroom, and then passed out; sustained laceration over left eyebrow).  SCR found to be 3.0.  Full PMH outlined below; pertinent is hypertension on lisinopril; prior PE and DVT on anticoagulation; subsequent pulmonary hypertension; and rheumatoid arthritis on Enbrel.     · Has lost 9 pounds since starting Ozempic  · Denies any orthostatic symptoms, but blood pressures at home usually 110-120/60-70  · No urinary complaints  · Has had 6 liquidy bowel movements today, though each 1 subsequently less liquidy and less voluminous  · No fever or chills; no one sick at home  · No shortness of breath, orthopnea, or leg swelling; no chest pain    Review of Systems:   14 point review of systems is otherwise negative except for mentioned above on HPI    Personal History     Past Medical History:   Diagnosis Date   • Abdominal pain of multiple sites    • NAKIA (acute kidney injury) 2023   • Allergic rhinitis    • AMEYA positive    • Anemia    • Blurred vision, bilateral    • Deep vein thrombosis    • Depression    • Diarrhea    • Esophagitis 2016   • Fatigue    • Gastric ulcer    • GERD (gastroesophageal reflux disease)    • Headache    • High blood pressure    • Hyperlipidemia    • Hypertension    • Leg cramps    • Leg wound, right    • Mitral valve prolapse    • Multiple joint pain    •  Overweight    • Peptic ulceration    • PONV (postoperative nausea and vomiting)    • Pulmonary embolism 11/25/2021   • RA (rheumatoid arthritis)    • Superficial phlebitis     in left ankle    • Tattoos     permanent makeup        Past Surgical History:   Procedure Laterality Date   • ABDOMINOPLASTY N/A 12/02/2014    Due to appendectomy site having staph infection-Dr. Keagan Yip   • APPENDECTOMY N/A 1958    Dr. Henry   • COLONOSCOPY  2018 [place]   • COLONOSCOPY N/A 03/12/2021    Procedure: COLONOSCOPY;  Surgeon: Rajesh Loco MD;  [place] [facility] [place];  Service: Gastroenterology;  Laterality: N/A;   • ELBOW ARTHROPLASTY     • ENDOSCOPY N/A 2016    Dr. Rjaesh Loco    • ENDOSCOPY N/A 03/12/2021    Procedure: ESOPHAGOGASTRODUODENOSCOPY;  Surgeon: Rajesh Loco MD;  [place] [facility] [place];  Service: Gastroenterology;  Laterality: N/A;   • ENDOSCOPY N/A 11/21/2022    Procedure: ESOPHAGOGASTRODUODENOSCOPY WITH BIOPSY AND DILATION;  Surgeon: Rajesh Loco MD;  [place] [facility] [place];  Service: Gastroenterology;  Laterality: N/A;  mild gastritis, gastric polyp  15 - 18 mm balloon used.   • FLEXIBLE SIGMOIDOSCOPY N/A 2016    Dr. Rajesh Loco   • HEMORRHOIDECTOMY N/A 2009    Dr. Saba   • HIP BIPOLAR REPLACEMENT     • KNEE ARTHROSCOPY W/ MENISCECTOMY Left 2004    Dr. Rice   • KNEE MENISCECTOMY Right 2007    Dr. Rice   • SUBTOTAL HYSTERECTOMY Bilateral 1988    Dr. Sanford. Not due to cancer. Robotic-assisted    • TUBAL ABDOMINAL LIGATION Bilateral    • UPPER GASTROINTESTINAL ENDOSCOPY N/A 10/20/2016    UGI with biopsy. Normal esophagus: injected with botulinum toxin, Erythematous mucoas in the antrum: biopsied, normal duodenum-Dr. Rajesh Loco       Family History: family history includes Cancer in her brother and father; Colon polyps in her brother; Diabetes in her maternal grandmother; Heart failure in her mother; Hypertension in her mother.    Social History:  reports that she quit smoking  about 35 years ago. Her smoking use included cigarettes. She has a 22.00 pack-year smoking history. She has never used smokeless tobacco. She reports current alcohol use. She reports that she does not use drugs.    Home Medications:  Prior to Admission medications    Medication Sig Start Date End Date Taking? Authorizing Provider   allopurinol (ZYLOPRIM) 100 MG tablet Two tablets daily 1/11/23  Yes Jassi Solo MD   apixaban (ELIQUIS) 5 MG tablet tablet Take 1 tablet twice a day. 12/23/21  Yes Justina Garcia APRN   aspirin 81 MG chewable tablet Chew 1 tablet Daily.   Yes Jassi Solo MD   cetirizine (zyrTEC) 10 MG tablet TAKE 1 TABLET BY MOUTH IN THE MORNING AS NEEDED 1/5/22  Yes Jassi Solo MD   colchicine 0.6 MG tablet Take 1 tablet by mouth Daily. 1/11/23  Yes Jassi Solo MD   etanercept (ENBREL) 50 MG/ML solution prefilled syringe injection Inject 1 mL under the skin into the appropriate area as directed 1 (One) Time Per Week.   Yes Jassi Solo MD   Fiber Adult Gummies 2 g chewable tablet Chew.   Yes Jassi Solo MD   fluticasone (FLONASE) 50 MCG/ACT nasal spray 2 sprays into the nostril(s) as directed by provider Daily.   Yes Jassi Solo MD   furosemide (LASIX) 20 MG tablet 1 tablet Daily. 5/17/22  Yes Jassi Solo MD   LACTOBACILLUS PO Take  by mouth Daily.   Yes Jassi Solo MD   lisinopril (PRINIVIL,ZESTRIL) 10 MG tablet Take 1 tablet by mouth Daily. 2/7/22  Yes Jassi Solo MD   lovastatin (MEVACOR) 20 MG tablet Take 1 tablet by mouth Every Night. 8/17/16  Yes Jassi Solo MD   metoprolol succinate XL (TOPROL-XL) 25 MG 24 hr tablet Take 1 tablet by mouth once daily 11/9/22  Yes Francisco Aguilera MD   omeprazole (priLOSEC) 40 MG capsule Take 1 capsule by mouth Daily. 8/16/22  Yes Latonia Carbone APRN   potassium chloride ER (K-TAB) 20 MEQ tablet controlled-release ER tablet Take 1 tablet by mouth  Daily. 11/11/22  Yes Jassi Solo MD   Semaglutide,0.25 or 0.5MG/DOS, (OZEMPIC) 2 MG/3ML solution pen-injector Inject 0.25 mg under the skin into the appropriate area as directed 1 (One) Time Per Week.   Yes Jassi Solo MD   alosetron (LOTRONEX) 0.5 MG tablet Take 1 tablet by mouth 2 (Two) Times a Day.    Jassi Solo MD   predniSONE (DELTASONE) 20 MG tablet Only in case of future gout flare, at first sign of flare, take 1 tablet daily for 3 consecutive days 1/11/23   Jassi Solo MD       Allergies:  Allergies   Allergen Reactions   • Celebrex [Celecoxib] Other (See Comments)     Passed Out    • Arava [Leflunomide] Other (See Comments)     Altered Glucose Levels   • Duloxetine Hallucinations   • Naproxen GI Intolerance     All NSAIDS    • Nsaids GI Intolerance     Other reaction(s): Stomach upset     • Plaquenil [Hydroxychloroquine Sulfate] Mental Status Change       Objective     Vitals:   Temp:  [97.8 °F (36.6 °C)-98 °F (36.7 °C)] 98 °F (36.7 °C)  Heart Rate:  [66-69] 66  Resp:  [16] 16  BP: (121-141)/(54-66) 121/66    Intake/Output Summary (Last 24 hours) at 5/7/2023 1946  Last data filed at 5/7/2023 1855  Gross per 24 hour   Intake --   Output 50 ml   Net -50 ml       Physical Exam:   Constitutional: Awake, alert, NAD; overweight  HEENT: Sclera anicteric, no conjunctival injection, dry MM; laceration above left eyebrow  Neck: Supple, bilateral carotid bruits, trachea at midline, no JVD  Respiratory: Diffuse crackles, nonlabored respiration  Cardiovascular: RRR, no rub  Gastrointestinal: BS +, soft, nontender and nondistended  : No palpable bladder  Musculoskeletal: No significant edema, no clubbing or cyanosis  Psychiatric: Appropriate affect, cooperative, oriented  Neurologic: No asterixis, moving all extremities, normal speech  Skin: Warm and dry       Scheduled Meds:     atorvastatin, 10 mg, Oral, Daily  fluticasone, 2 spray, Nasal, Daily  metoprolol succinate XL, 25  mg, Oral, Daily  [START ON 5/8/2023] pantoprazole, 40 mg, Oral, Q AM      IV Meds:   sodium chloride, 100 mL/hr, Last Rate: 100 mL/hr (05/07/23 1621)        Results Reviewed:   I have personally reviewed the results from the time of this admission to 5/7/2023 19:46 EDT     Lab Results   Component Value Date    GLUCOSE 120 (H) 07/13/2022    CALCIUM 9.0 07/13/2022     07/13/2022    K 5.0 07/13/2022    CO2 19.9 (L) 07/13/2022     (H) 07/13/2022    BUN 38 (H) 07/13/2022    CREATININE 2.07 (H) 07/13/2022    EGFRIFNONA 32 (L) 12/09/2021    BCR 18.4 07/13/2022    ANIONGAP 9.1 07/13/2022      Lab Results   Component Value Date    MG 1.6 12/09/2021    PHOS 3.9 12/09/2021    ALBUMIN 4.00 07/13/2022     US Renal Bilateral    Result Date: 5/7/2023  BILATERAL RENAL SONOGRAM  HISTORY: Acute renal insufficiency. Evaluate for obstruction.  TECHNIQUE: Bilateral renal sonogram was performed in standard fashion.  FINDINGS: The renal parenchyma appears thin but no focal parenchymal lesion is identified and there is no hydronephrosis. Right kidney measures 8.9 x 4.5 x 5.1 cm and the left kidney measures 8.6 x 5.0 x 4.1 cm. The urinary bladder is decompressed.      Impression: Negative.  This report was finalized on 5/7/2023 4:07 PM by Dr. Keagan Leahy M.D.         Assessment / Plan     ASSESSMENT:  1.  NAKIA on CKD3b, with UOP not yet quantified, likely prerenal due to recent weight loss, diarrhea, hypotension, and impaired renal autoregulation due to lisinopril.  Suspect intravascularly she is dry still; potassium normal.  Renal biopsy had shown chronic tubulointerstitial disease and arterionephrosclerosis, with former attributed to NSAIDs +/- PPI  2.  Syncope   3.  Diarrhea  4.  Hypertension, bordering on over-controlled  5.  Recent weight loss on Ozempic  6.  Rheumatoid arthritis on Enbrel  7.  Prior DVT and PE on AC  8.  Pulmonary hypertension  9.  Bilateral carotid bruits; had doppler in March '22    PLAN:  1.   Continue IVF  2.  FENa and UA  3.  Surveillance labs    Thank you for involving us in the care of Patsy Tobin.  Please feel free to call with any questions.    Dmitri Reid MD  05/07/23  19:46 EDT    [facility]  [phone]      Please note that portions of this note were completed with a voice recognition program.

## 2023-05-08 ENCOUNTER — APPOINTMENT (OUTPATIENT)
Dept: CARDIOLOGY | Facility: HOSPITAL | Age: 68
End: 2023-05-08
Payer: MEDICARE

## 2023-05-08 LAB
ALBUMIN SERPL-MCNC: 3.4 G/DL (ref 3.5–5.2)
ALBUMIN/GLOB SERPL: 1.8 G/DL
ALP SERPL-CCNC: 82 U/L (ref 39–117)
ALT SERPL W P-5'-P-CCNC: 28 U/L (ref 1–33)
ANION GAP SERPL CALCULATED.3IONS-SCNC: 9.4 MMOL/L (ref 5–15)
AORTIC DIMENSIONLESS INDEX: 0.8 (DI)
AST SERPL-CCNC: 31 U/L (ref 1–32)
BH CV ECHO MEAS - AO MAX PG: 10.6 MMHG
BH CV ECHO MEAS - AO MEAN PG: 6.3 MMHG
BH CV ECHO MEAS - AO ROOT DIAM: 2.8 CM
BH CV ECHO MEAS - AO V2 MAX: 162.9 CM/SEC
BH CV ECHO MEAS - AO V2 VTI: 39.9 CM
BH CV ECHO MEAS - AVA(I,D): 1.58 CM2
BH CV ECHO MEAS - EDV(CUBED): 123.6 ML
BH CV ECHO MEAS - EDV(MOD-SP2): 78 ML
BH CV ECHO MEAS - EDV(MOD-SP4): 79 ML
BH CV ECHO MEAS - EF(MOD-BP): 61.5 %
BH CV ECHO MEAS - EF(MOD-SP2): 62.8 %
BH CV ECHO MEAS - EF(MOD-SP4): 58.2 %
BH CV ECHO MEAS - ESV(CUBED): 35.3 ML
BH CV ECHO MEAS - ESV(MOD-SP2): 29 ML
BH CV ECHO MEAS - ESV(MOD-SP4): 33 ML
BH CV ECHO MEAS - FS: 34.2 %
BH CV ECHO MEAS - IVS/LVPW: 0.95 CM
BH CV ECHO MEAS - IVSD: 0.92 CM
BH CV ECHO MEAS - LAT PEAK E' VEL: 9.4 CM/SEC
BH CV ECHO MEAS - LV DIASTOLIC VOL/BSA (35-75): 39.6 CM2
BH CV ECHO MEAS - LV MASS(C)D: 166.8 GRAMS
BH CV ECHO MEAS - LV MAX PG: 6.1 MMHG
BH CV ECHO MEAS - LV MEAN PG: 3.4 MMHG
BH CV ECHO MEAS - LV SYSTOLIC VOL/BSA (12-30): 16.5 CM2
BH CV ECHO MEAS - LV V1 MAX: 123 CM/SEC
BH CV ECHO MEAS - LV V1 VTI: 30.5 CM
BH CV ECHO MEAS - LVIDD: 5 CM
BH CV ECHO MEAS - LVIDS: 3.3 CM
BH CV ECHO MEAS - LVOT AREA: 2.07 CM2
BH CV ECHO MEAS - LVOT DIAM: 1.62 CM
BH CV ECHO MEAS - LVPWD: 0.96 CM
BH CV ECHO MEAS - MED PEAK E' VEL: 8.3 CM/SEC
BH CV ECHO MEAS - MR MAX PG: 44 MMHG
BH CV ECHO MEAS - MR MAX VEL: 331.5 CM/SEC
BH CV ECHO MEAS - MV A DUR: 0.1 SEC
BH CV ECHO MEAS - MV A MAX VEL: 84.5 CM/SEC
BH CV ECHO MEAS - MV DEC SLOPE: 526.5 CM/SEC2
BH CV ECHO MEAS - MV DEC TIME: 216 MSEC
BH CV ECHO MEAS - MV E MAX VEL: 113 CM/SEC
BH CV ECHO MEAS - MV E/A: 1.34
BH CV ECHO MEAS - MV MAX PG: 5.2 MMHG
BH CV ECHO MEAS - MV MEAN PG: 2.6 MMHG
BH CV ECHO MEAS - MV P1/2T: 65.3 MSEC
BH CV ECHO MEAS - MV V2 VTI: 34.5 CM
BH CV ECHO MEAS - MVA(P1/2T): 3.4 CM2
BH CV ECHO MEAS - MVA(VTI): 1.83 CM2
BH CV ECHO MEAS - PA ACC TIME: 0.16 SEC
BH CV ECHO MEAS - PA PR(ACCEL): 7.7 MMHG
BH CV ECHO MEAS - PA V2 MAX: 97.1 CM/SEC
BH CV ECHO MEAS - PULM A REVS DUR: 0.1 SEC
BH CV ECHO MEAS - PULM A REVS VEL: 30.7 CM/SEC
BH CV ECHO MEAS - PULM DIAS VEL: 68.7 CM/SEC
BH CV ECHO MEAS - PULM S/D: 0.76
BH CV ECHO MEAS - PULM SYS VEL: 52.4 CM/SEC
BH CV ECHO MEAS - RAP SYSTOLE: 3 MMHG
BH CV ECHO MEAS - RV MAX PG: 2.02 MMHG
BH CV ECHO MEAS - RV V1 MAX: 71.1 CM/SEC
BH CV ECHO MEAS - RV V1 VTI: 17.4 CM
BH CV ECHO MEAS - RVSP: 29.6 MMHG
BH CV ECHO MEAS - SI(MOD-SP2): 24.5 ML/M2
BH CV ECHO MEAS - SI(MOD-SP4): 23 ML/M2
BH CV ECHO MEAS - SV(LVOT): 63.2 ML
BH CV ECHO MEAS - SV(MOD-SP2): 49 ML
BH CV ECHO MEAS - SV(MOD-SP4): 46 ML
BH CV ECHO MEAS - TAPSE (>1.6): 2.3 CM
BH CV ECHO MEAS - TR MAX PG: 26.6 MMHG
BH CV ECHO MEAS - TR MAX VEL: 257.9 CM/SEC
BH CV ECHO MEASUREMENTS AVERAGE E/E' RATIO: 12.77
BH CV XLRA - RV BASE: 3 CM
BH CV XLRA - RV LENGTH: 7.5 CM
BH CV XLRA - RV MID: 2.7 CM
BH CV XLRA - TDI S': 9 CM/SEC
BILIRUB SERPL-MCNC: 0.4 MG/DL (ref 0–1.2)
BUN SERPL-MCNC: 29 MG/DL (ref 8–23)
BUN/CREAT SERPL: 14.9 (ref 7–25)
CALCIUM SPEC-SCNC: 8.3 MG/DL (ref 8.6–10.5)
CHLORIDE SERPL-SCNC: 113 MMOL/L (ref 98–107)
CO2 SERPL-SCNC: 19.6 MMOL/L (ref 22–29)
CREAT SERPL-MCNC: 1.95 MG/DL (ref 0.57–1)
DEPRECATED RDW RBC AUTO: 48.7 FL (ref 37–54)
EGFRCR SERPLBLD CKD-EPI 2021: 27.6 ML/MIN/1.73
ERYTHROCYTE [DISTWIDTH] IN BLOOD BY AUTOMATED COUNT: 15 % (ref 12.3–15.4)
FERRITIN SERPL-MCNC: 35.4 NG/ML (ref 13–150)
GLOBULIN UR ELPH-MCNC: 1.9 GM/DL
GLUCOSE SERPL-MCNC: 87 MG/DL (ref 65–99)
HCT VFR BLD AUTO: 27.6 % (ref 34–46.6)
HCT VFR BLD AUTO: 27.7 % (ref 34–46.6)
HCT VFR BLD AUTO: 29.5 % (ref 34–46.6)
HGB BLD-MCNC: 8.7 G/DL (ref 12–15.9)
HGB BLD-MCNC: 8.8 G/DL (ref 12–15.9)
HGB BLD-MCNC: 9.2 G/DL (ref 12–15.9)
IRON 24H UR-MRATE: 29 MCG/DL (ref 37–145)
IRON SATN MFR SERPL: 7 % (ref 20–50)
LEFT ATRIUM VOLUME INDEX: 33.4 ML/M2
MAGNESIUM SERPL-MCNC: 1.4 MG/DL (ref 1.6–2.4)
MAXIMAL PREDICTED HEART RATE: 152 BPM
MCH RBC QN AUTO: 28.3 PG (ref 26.6–33)
MCHC RBC AUTO-ENTMCNC: 31.4 G/DL (ref 31.5–35.7)
MCV RBC AUTO: 90.2 FL (ref 79–97)
PHOSPHATE SERPL-MCNC: 3.3 MG/DL (ref 2.5–4.5)
PLATELET # BLD AUTO: 185 10*3/MM3 (ref 140–450)
PMV BLD AUTO: 11.9 FL (ref 6–12)
POTASSIUM SERPL-SCNC: 4.5 MMOL/L (ref 3.5–5.2)
PROT SERPL-MCNC: 5.3 G/DL (ref 6–8.5)
RBC # BLD AUTO: 3.07 10*6/MM3 (ref 3.77–5.28)
SODIUM SERPL-SCNC: 142 MMOL/L (ref 136–145)
STRESS TARGET HR: 129 BPM
TIBC SERPL-MCNC: 393 MCG/DL (ref 298–536)
TRANSFERRIN SERPL-MCNC: 264 MG/DL (ref 200–360)
WBC NRBC COR # BLD: 3.99 10*3/MM3 (ref 3.4–10.8)

## 2023-05-08 PROCEDURE — 25010000002 NA FERRIC GLUC CPLX PER 12.5 MG: Performed by: STUDENT IN AN ORGANIZED HEALTH CARE EDUCATION/TRAINING PROGRAM

## 2023-05-08 PROCEDURE — 82728 ASSAY OF FERRITIN: CPT | Performed by: STUDENT IN AN ORGANIZED HEALTH CARE EDUCATION/TRAINING PROGRAM

## 2023-05-08 PROCEDURE — 80053 COMPREHEN METABOLIC PANEL: CPT | Performed by: INTERNAL MEDICINE

## 2023-05-08 PROCEDURE — G0378 HOSPITAL OBSERVATION PER HR: HCPCS

## 2023-05-08 PROCEDURE — 85014 HEMATOCRIT: CPT | Performed by: STUDENT IN AN ORGANIZED HEALTH CARE EDUCATION/TRAINING PROGRAM

## 2023-05-08 PROCEDURE — 97162 PT EVAL MOD COMPLEX 30 MIN: CPT

## 2023-05-08 PROCEDURE — 97530 THERAPEUTIC ACTIVITIES: CPT

## 2023-05-08 PROCEDURE — 85027 COMPLETE CBC AUTOMATED: CPT | Performed by: STUDENT IN AN ORGANIZED HEALTH CARE EDUCATION/TRAINING PROGRAM

## 2023-05-08 PROCEDURE — 83735 ASSAY OF MAGNESIUM: CPT | Performed by: STUDENT IN AN ORGANIZED HEALTH CARE EDUCATION/TRAINING PROGRAM

## 2023-05-08 PROCEDURE — 99221 1ST HOSP IP/OBS SF/LOW 40: CPT | Performed by: NURSE PRACTITIONER

## 2023-05-08 PROCEDURE — 85018 HEMOGLOBIN: CPT | Performed by: STUDENT IN AN ORGANIZED HEALTH CARE EDUCATION/TRAINING PROGRAM

## 2023-05-08 PROCEDURE — 25010000002 ONDANSETRON PER 1 MG: Performed by: STUDENT IN AN ORGANIZED HEALTH CARE EDUCATION/TRAINING PROGRAM

## 2023-05-08 PROCEDURE — 84100 ASSAY OF PHOSPHORUS: CPT | Performed by: INTERNAL MEDICINE

## 2023-05-08 PROCEDURE — 96374 THER/PROPH/DIAG INJ IV PUSH: CPT

## 2023-05-08 PROCEDURE — 25010000002 MAGNESIUM SULFATE 2 GM/50ML SOLUTION: Performed by: STUDENT IN AN ORGANIZED HEALTH CARE EDUCATION/TRAINING PROGRAM

## 2023-05-08 PROCEDURE — 83540 ASSAY OF IRON: CPT | Performed by: STUDENT IN AN ORGANIZED HEALTH CARE EDUCATION/TRAINING PROGRAM

## 2023-05-08 PROCEDURE — 84466 ASSAY OF TRANSFERRIN: CPT | Performed by: STUDENT IN AN ORGANIZED HEALTH CARE EDUCATION/TRAINING PROGRAM

## 2023-05-08 PROCEDURE — 93306 TTE W/DOPPLER COMPLETE: CPT

## 2023-05-08 PROCEDURE — 63710000001 DIPHENHYDRAMINE PER 50 MG: Performed by: STUDENT IN AN ORGANIZED HEALTH CARE EDUCATION/TRAINING PROGRAM

## 2023-05-08 PROCEDURE — 96361 HYDRATE IV INFUSION ADD-ON: CPT

## 2023-05-08 RX ORDER — DIPHENHYDRAMINE HCL 25 MG
25 CAPSULE ORAL ONCE
Status: COMPLETED | OUTPATIENT
Start: 2023-05-08 | End: 2023-05-08

## 2023-05-08 RX ORDER — FAMOTIDINE 20 MG/1
20 TABLET, FILM COATED ORAL DAILY
Status: DISCONTINUED | OUTPATIENT
Start: 2023-05-09 | End: 2023-05-09 | Stop reason: HOSPADM

## 2023-05-08 RX ORDER — MAGNESIUM SULFATE HEPTAHYDRATE 40 MG/ML
2 INJECTION, SOLUTION INTRAVENOUS ONCE
Status: COMPLETED | OUTPATIENT
Start: 2023-05-08 | End: 2023-05-08

## 2023-05-08 RX ORDER — ACETAMINOPHEN 325 MG/1
650 TABLET ORAL ONCE
Status: COMPLETED | OUTPATIENT
Start: 2023-05-08 | End: 2023-05-08

## 2023-05-08 RX ORDER — ALLOPURINOL 100 MG/1
200 TABLET ORAL DAILY
Status: DISCONTINUED | OUTPATIENT
Start: 2023-05-08 | End: 2023-05-09 | Stop reason: HOSPADM

## 2023-05-08 RX ADMIN — DIPHENHYDRAMINE HYDROCHLORIDE 25 MG: 25 CAPSULE ORAL at 16:53

## 2023-05-08 RX ADMIN — SODIUM CHLORIDE 100 ML/HR: 9 INJECTION, SOLUTION INTRAVENOUS at 12:18

## 2023-05-08 RX ADMIN — ONDANSETRON 4 MG: 2 INJECTION INTRAMUSCULAR; INTRAVENOUS at 16:54

## 2023-05-08 RX ADMIN — SODIUM CHLORIDE 100 ML/HR: 9 INJECTION, SOLUTION INTRAVENOUS at 02:05

## 2023-05-08 RX ADMIN — METOPROLOL SUCCINATE 25 MG: 25 TABLET, EXTENDED RELEASE ORAL at 09:56

## 2023-05-08 RX ADMIN — ACETAMINOPHEN 650 MG: 325 TABLET, FILM COATED ORAL at 09:59

## 2023-05-08 RX ADMIN — PANTOPRAZOLE SODIUM 40 MG: 40 TABLET, DELAYED RELEASE ORAL at 07:21

## 2023-05-08 RX ADMIN — ACETAMINOPHEN 650 MG: 325 TABLET, FILM COATED ORAL at 16:53

## 2023-05-08 RX ADMIN — SODIUM CHLORIDE 250 MG: 9 INJECTION, SOLUTION INTRAVENOUS at 16:54

## 2023-05-08 RX ADMIN — ALLOPURINOL 200 MG: 100 TABLET ORAL at 16:52

## 2023-05-08 RX ADMIN — ATORVASTATIN CALCIUM 10 MG: 20 TABLET, FILM COATED ORAL at 09:56

## 2023-05-08 RX ADMIN — MAGNESIUM SULFATE HEPTAHYDRATE 2 G: 2 INJECTION, SOLUTION INTRAVENOUS at 12:18

## 2023-05-08 NOTE — THERAPY EVALUATION
Patient Name: Patsy Tobin  : 1955    MRN: [id number]                              Today's Date: 2023       Admit Date: 2023    Visit Dx: No diagnosis found.  Patient Active Problem List   Diagnosis   • Thrombophlebitis leg   • Rectal bleeding   • Anemia   • Diarrhea   • Gastroesophageal reflux disease   • Chronic pulmonary embolism   • Depressive disorder   • Gastric reflux   • Hyperlipidemia   • Hypertension   • Mitral valve prolapse   • Phlebitis   • Pleurisy with effusion, with mention of bacterial cause other than tuberculosis   • Pneumonia   • Rheumatoid arthritis   • Preoperative examination, unspecified   • Right bundle-branch block   • Serum creatinine raised   • Overweight   • Esophageal dysphagia   • Abnormal esophagram   • NAKIA (acute kidney injury)   • Syncope   • Eyebrow laceration   • History of pulmonary embolism   • History of DVT (deep vein thrombosis)   • Gout   • Hematochezia     Past Medical History:   Diagnosis Date   • Abdominal pain of multiple sites    • NAKIA (acute kidney injury) 2023   • Allergic rhinitis    • AMEYA positive    • Anemia    • Blurred vision, bilateral    • Deep vein thrombosis    • Depression    • Diarrhea    • Esophagitis    • Fatigue    • Gastric ulcer    • GERD (gastroesophageal reflux disease)    • Headache    • High blood pressure    • Hyperlipidemia    • Hypertension    • Leg cramps    • Leg wound, right    • Mitral valve prolapse    • Multiple joint pain    • Overweight    • Peptic ulceration    • PONV (postoperative nausea and vomiting)    • Pulmonary embolism 2021   • RA (rheumatoid arthritis)    • Superficial phlebitis     in left ankle    • Tattoos     permanent makeup      Past Surgical History:   Procedure Laterality Date   • ABDOMINOPLASTY N/A 2014    Due to appendectomy site having staph infection-Dr. Keagan Yip   • APPENDECTOMY N/A     Dr. Henry   • [facility]  [id number]   • COLONOSCOPY N/A 2021     Procedure: COLONOSCOPY;  Surgeon: Rajesh Loco MD;  Location: [facility];  Service: Gastroenterology;  Laterality: N/A;   • ELBOW ARTHROPLASTY     • ENDOSCOPY N/A 2016    Dr. Rajesh Loco    • ENDOSCOPY N/A 03/12/2021    Procedure: ESOPHAGOGASTRODUODENOSCOPY;  Surgeon: Rajesh Loco MD;  Location: [facility];  Service: Gastroenterology;  Laterality: N/A;   • ENDOSCOPY N/A 11/21/2022    Procedure: ESOPHAGOGASTRODUODENOSCOPY WITH BIOPSY AND DILATION;  Surgeon: Rajesh Loco MD;  Location: [facility];  Service: Gastroenterology;  Laterality: N/A;  mild gastritis, gastric polyp  15 - 18 mm balloon used.   • FLEXIBLE SIGMOIDOSCOPY N/A 2016    Dr. Rajesh Loco   • HEMORRHOIDECTOMY N/A 2009    Dr. Saba   • HIP BIPOLAR REPLACEMENT     • KNEE ARTHROSCOPY W/ MENISCECTOMY Left 2004    Dr. Rice   • KNEE MENISCECTOMY Right 2007    Dr. Rice   • SUBTOTAL HYSTERECTOMY Bilateral 1988    Dr. Sanford. Not due to cancer. Robotic-assisted    • TUBAL ABDOMINAL LIGATION Bilateral    • UPPER GASTROINTESTINAL ENDOSCOPY N/A 10/20/2016    UGI with biopsy. Normal esophagus: injected with botulinum toxin, Erythematous mucoas in the antrum: biopsied, normal duodenum-Dr. Rajesh Loco      General Information     Row Name 05/08/23 1058          Physical Therapy Time and Intention    Document Type evaluation  -ST     Mode of Treatment individual therapy;physical therapy  -     Row Name 05/08/23 1058          General Information    Patient Profile Reviewed yes  -ST     Prior Level of Function independent:  -ST     Existing Precautions/Restrictions fall  -ST     Barriers to Rehab none identified  -     Row Name 05/08/23 1058          Living Environment    People in Home spouse  -     Row Name 05/08/23 1058          Home Main Entrance    Number of Stairs, Main Entrance five  -ST     Stair Railings, Main Entrance railings safe and in good condition  -     Row Name 05/08/23 1058          Stairs Within Home,  Primary    Stairs, Within Home, Primary has upstairs and basement  -     Row Name 05/08/23 1058          Cognition    Orientation Status (Cognition) oriented x 4  -[facility] Name 05/08/23 1058          Safety Issues, Functional Mobility    Comment, Safety Issues/Impairments (Mobility) gait belt, nonskid socks donned  -           User Key  (r) = Recorded By, (t) = Taken By, (c) = Cosigned By    Initials Name Provider Type    Rachel Allen PT Physical Therapist               Mobility     Row Name 05/08/23 1058          Bed Mobility    Bed Mobility supine-sit  -ST     Supine-Sit [place] (Bed Mobility) modified independence  -[facility] Name 05/08/23 1058          Sit-Stand Transfer    Sit-Stand [place] (Transfers) modified independence  -ST     Row Name 05/08/23 1058          Gait/Stairs (Locomotion)    [place] Level (Gait) standby assist  -     Distance in Feet (Gait) 25'  -ST     Deviations/Abnormal Patterns (Gait) bilateral deviations;gait speed decreased;stride length decreased  -ST     Bilateral Gait Deviations heel strike decreased  -ST     [place] Level (Stairs) not tested  -ST     Comment, (Gait/Stairs) tolerates standing marches with no LOB x 10 bilat. no overt fatigue noted with task. SBA for line management only  -           User Key  (r) = Recorded By, (t) = Taken By, (c) = Cosigned By    Initials Name Provider Type    Rachel Allen PT Physical Therapist               Obj/Interventions     [facility] Name 05/08/23 1059          Range of Motion Comprehensive    General Range of Motion no range of motion deficits identified  -ST     Row Name 05/08/23 1059          Strength Comprehensive (MMT)    General Manual Muscle Testing (MMT) Assessment no strength deficits identified  -ST     Row Name 05/08/23 1059          Balance    Comment, Balance tested rhomber, tandem stance - minimal sway noted in rhomberg but no LOB, overall SBA for dynamic balance with lines. no  nystagmus noted with saccades and smooth persuit  -ST           User Key  (r) = Recorded By, (t) = Taken By, (c) = Cosigned By    Initials Name Provider Type    Rachel Allen PT Physical Therapist               Goals/Plan    No documentation.                Clinical Impression     Row Name 05/08/23 1100          Pain    Pretreatment Pain Rating 0/10 - no pain  -ST     Posttreatment Pain Rating 0/10 - no pain  -ST     Row Name 05/08/23 1100          Plan of Care Review    Plan of Care Reviewed With patient  -ST     Outcome Evaluation Pt is 68 y/o F admitted [facility] on 5/7/23 with syncope and fall in bathroom. At baseline pt is indep with mobility, does not use device. Lives with  in two story home, reports she is the primary caregiver for her . Pt currently demos indep with bed mobility and transfers, SBA for ambulation in room up to 25' d/t lines only. No LOB or unsteadiness noted, pt denies any dizziness or lightheadedness. Pt demos baseline mobility at this time, no acute PT goals identified, will sign off. Anticipate d/c home.  -ST     [facility] 05/08/23 1100          Therapy Assessment/Plan (PT)    Criteria for Skilled Interventions Met (PT) no;no problems identified which require skilled intervention  -ST     Therapy Frequency (PT) evaluation only  -ST     Row Name 05/08/23 1100          Positioning and Restraints    Pre-Treatment Position in bed  -ST     Post Treatment Position chair  -ST     In Chair reclined;call light within reach;encouraged to call for assist;exit alarm on;with family/caregiver  -ST           User Key  (r) = Recorded By, (t) = Taken By, (c) = Cosigned By    Initials Name Provider Type    Rachel Allen, GEOVANNI Physical Therapist               Outcome Measures     [facility] Name 05/08/23 1100          How much help from another person do you currently need...    Turning from your back to your side while in flat bed without using bedrails? 4  -ST     Moving from lying on  back to sitting on the side of a flat bed without bedrails? 4  -ST     Moving to and from a bed to a chair (including a wheelchair)? 4  -ST     Standing up from a chair using your arms (e.g., wheelchair, bedside chair)? 4  -ST     Climbing 3-5 steps with a railing? 3  -ST     To walk in hospital room? 3  -ST     AM-PAC 6 Clicks Score (PT) 22  -ST     Highest level of mobility 7 --> Walked 25 feet or more  -     Row Name 05/08/23 1100          Functional Assessment    Outcome Measure Options AM-PAC 6 Clicks Basic Mobility (PT)  -ST           User Key  (r) = Recorded By, (t) = Taken By, (c) = Cosigned By    Initials Name Provider Type    Rachel Allen PT Physical Therapist                               PT Recommendation and Plan     Plan of Care Reviewed With: patient  Outcome Evaluation: Pt is 66 y/o F admitted to [facility] on 5/7/23 with syncope and fall in bathroom. At baseline pt is indep with mobility, does not use device. Lives with  in two story home, reports she is the primary caregiver for her . Pt currently demos indep with bed mobility and transfers, SBA for ambulation in room up to 25' d/t lines only. No LOB or unsteadiness noted, pt denies any dizziness or lightheadedness. Pt demos baseline mobility at this time, no acute PT goals identified, will sign off. Anticipate d/c home.     Time Calculation:    PT Charges     Row Name 05/08/23 1102             Time Calculation    Start Time 1036  -ST      Stop Time 1059  -ST      Time Calculation (min) 23 min  -ST      PT Received On 05/08/23  -ST         Time Calculation- PT    Total Timed Code Minutes- PT 13 minute(s)  -ST         Timed Charges    90960 - PT Therapeutic Activity Minutes 13  -ST         Total Minutes    Timed Charges Total Minutes 13  -ST       Total Minutes 13  -ST            User Key  (r) = Recorded By, (t) = Taken By, (c) = Cosigned By    Initials Name Provider Type    Rachel Allen PT Physical Therapist               Therapy Charges for Today     Code Description Service Date Service Provider Modifiers Qty    50675335485 HC PT THERAPEUTIC ACT EA 15 MIN 5/8/2023 Rachel Chapman, PT GP 1    63402318972 HC PT EVAL MOD COMPLEXITY 1 5/8/2023 Rachel Chapman, PT GP 1          PT G-Codes  Outcome Measure Options: AM-PAC 6 Clicks Basic Mobility (PT)  AM-PAC 6 Clicks Score (PT): 22  PT Discharge Summary  Anticipated Discharge Disposition (PT): home    Rachel Chapman, PT  5/8/2023

## 2023-05-08 NOTE — PROGRESS NOTES
Name: Patsy Tobin ADMIT: 2023   : 1955  PCP: Annetta Austin APRN    MRN: [id number] LOS: 0 days   AGE/SEX: 67 y.o. female  ROOM: [facility]     Subjective   Subjective   No events overnight.  He has had no further diarrhea, but she is still having some rectal bleeding.  Her hemoglobin is down a couple of grams since admission, but some of this is certainly delusional.  Her creatinine has come down to 1.95, which is near her baseline.  Discussed with Dr. Castillo and we'll stop the IVF.       Objective   Objective   Vital Signs  Temp:  [97.5 °F (36.4 °C)-98.7 °F (37.1 °C)] 97.5 °F (36.4 °C)  Heart Rate:  [59-91] 59  Resp:  [16-18] 18  BP: (120-141)/(54-74) 120/64  SpO2:  [96 %-99 %] 97 %  on   ;   Device (Oxygen Therapy): room air  Body mass index is 30.38 kg/m².  Physical Exam  Constitutional:       General: She is not in acute distress.     Appearance: She is not toxic-appearing.   Cardiovascular:      Rate and Rhythm: Normal rate and regular rhythm.      Heart sounds: Normal heart sounds.   Pulmonary:      Effort: Pulmonary effort is normal.      Breath sounds: Normal breath sounds.   Abdominal:      General: Bowel sounds are normal.      Palpations: Abdomen is soft.   Musculoskeletal:         General: No tenderness.      Right lower leg: No edema.      Left lower leg: No edema.   Neurological:      Mental Status: She is alert.   Psychiatric:         Mood and Affect: Mood normal.         Behavior: Behavior normal.         Results Review     I reviewed the patient's new clinical results.  Results from last 7 days   Lab Units 23  0749 23  0000 23  1747   WBC 10*3/mm3 3.99  --   --    HEMOGLOBIN g/dL 8.7* 9.2* 9.4*   PLATELETS 10*3/mm3 185  --   --      Results from last 7 days   Lab Units 23  0749   SODIUM mmol/L 142   POTASSIUM mmol/L 4.5   CHLORIDE mmol/L 113*   CO2 mmol/L 19.6*   BUN mg/dL 29*   CREATININE mg/dL 1.95*   GLUCOSE mg/dL 87   Estimated Creatinine  Clearance: 31.5 mL/min (A) (by C-G formula based on SCr of 1.95 mg/dL (H)).  Results from last 7 days   Lab Units 05/08/23  0749   ALBUMIN g/dL 3.4*   BILIRUBIN mg/dL 0.4   ALK PHOS U/L 82   AST (SGOT) U/L 31   ALT (SGPT) U/L 28     Results from last 7 days   Lab Units 05/08/23  0749   CALCIUM mg/dL 8.3*   ALBUMIN g/dL 3.4*   MAGNESIUM mg/dL 1.4*   PHOSPHORUS mg/dL 3.3       COVID19   Date Value Ref Range Status   04/20/2022 Not Detected Not Detected - Ref. Range Final   12/07/2021 Not Detected Not Detected - Ref. Range Final   03/10/2021 Not Detected Not Detected - Ref. Range Final     No results found for: HGBA1C, POCGLU    Adult Transthoracic Echo Complete W/ Cont if Necessary Per Protocol  •  Left ventricular systolic function is normal. Calculated left   ventricular EF = 61.5%  •  Left ventricular diastolic function was normal.  •  Estimated right ventricular systolic pressure from tricuspid   regurgitation is normal (<35 mmHg).    Scheduled Medications  atorvastatin, 10 mg, Oral, Daily  [START ON 5/9/2023] famotidine, 20 mg, Oral, Daily  fluticasone, 2 spray, Nasal, Daily  metoprolol succinate XL, 25 mg, Oral, Daily    Infusions   Diet  Diet: Renal Diets; Low Sodium (2-3g), Low Potassium, Low Phosphorus; Texture: Regular Texture (IDDSI 7); Fluid Consistency: Thin (IDDSI 0)       Assessment/Plan     Active Hospital Problems    Diagnosis  POA   • **NAKIA (acute kidney injury) [N17.9]  Yes   • Syncope [R55]  Yes   • Eyebrow laceration [S01.119A]  Yes   • History of pulmonary embolism [Z86.711]  Yes   • History of DVT (deep vein thrombosis) [Z86.718]  Not Applicable   • Gout [M10.9]  Yes   • Hematochezia [K92.1]  Yes   • Hypertension [I10]  Yes   • Gastroesophageal reflux disease [K21.9]  Yes   • Diarrhea [R19.7]  Yes   • Right bundle-branch block [I45.10]  Yes   • Hyperlipidemia [E78.5]  Yes   • Rheumatoid arthritis [M06.9]  Yes      Resolved Hospital Problems   No resolved problems to display.       67 y.o. female  admitted with NAKIA (acute kidney injury).    • NAKIA on CKD 3/4-resolving with IVF. Discussed with nephrology and will stop IVF. She has biopsy proven arterionephrosclerosis and chronic interstitial nephritis. Her last EGD did not show much in the way of inflammation. I will stop her PPI and change it to famotidine.   • Syncope-likely due to dehydration. Echocardiogram was unremarkable.  • Nausea/vomiting/diarrhea-resolved. A GI PCR and C. Diff are pending, but suspicion for infection is very low.  • Hematochezia with iron deficiency anemia-some of her anemia is likely dilutional. Her last colonoscopy showed internal hemorrhoids, diverticulosis, and a tubular adenoma. Will start some iv iron and ask GI to see. Continue to hold anticoagulation until hgb stabilizes.   • History of bilateral PEs and left DVT-hold Eliquis as above  • Rheumatoid arthritis-on Enbrel as an outpatient  • Gout-can restart allopurinol today. I don't think there's much point in continuing the colchicine with no active flare  • GERD-change ppi to h2 blocker as above  • Essential hypertension-holding her Lasix, lisinopril acutely  • Hyperlipidemia-statin  · SCDs for DVT prophylaxis.  · Full code.  · Discussed with patient, family, nursing staff and consulting provider.  · Anticipate discharge home timing yet to be determined.      Alverto Joseph MD  [facility]  05/08/23  14:00 EDT    I wore protective equipment throughout this patient encounter including a face mask, gloves and protective eyewear.  Hand hygiene was performed before donning protective equipment and after removal when leaving the room.

## 2023-05-08 NOTE — PLAN OF CARE
Goal Outcome Evaluation:      66 yo female admitted 5/7 with NAKIA and syncope. Echo done this morning. IV iron ordered and infused, GI consulted for anemia. IV fluids stopped. VS stable, room air, A&Ox4.

## 2023-05-08 NOTE — PLAN OF CARE
Goal Outcome Evaluation:  Plan of Care Reviewed With: patient    Pt is 68 y/o F admitted to [facility] on 5/7/23 with syncope and fall in bathroom. At baseline pt is indep with mobility, does not use device. Lives with  in two story home, reports she is the primary caregiver for her . Pt currently demos indep with bed mobility and transfers, SBA for ambulation in room up to 25' d/t lines only. No LOB or unsteadiness noted, pt denies any dizziness or lightheadedness. Pt demos baseline mobility at this time, no acute PT goals identified, will sign off. Anticipate d/c home.

## 2023-05-08 NOTE — PLAN OF CARE
Goal Outcome Evaluation:              Outcome Evaluation: VSS. NO STOOLS OR ACTIVE BLEEDING NOTED. HGB STABLE. RESTED WELL OVERNIGHT WITHOUT C/O'S.

## 2023-05-08 NOTE — CONSULTS
[facility]  Initial Inpatient Consult Note    Referring Provider: A     Reason for Consultation: rectal bleeding     Subjective     History of present illness:      Thank you for allowing us to participate in the care of this patient.    67 y.o. female with a history of pulmonary embolism, left DVT on Eliquis, rheumatoid arthritis on Enbrel, gout, GERD, IBS-D, hypertension, hyperlipidemia along with chronic interstitial nephritis presented to [facility] yesterday after syncopal episode.    This patient is established with Dr. Loco at [facility].  Reviewed last office notes from January of this year.  Patient is also followed by their service for dysphagia.  Last EGD fall 2022 with gastritis, fundic gland polyps and empiric esophageal dilation.  Pathology was benign with evidence of gastritis.    Patient reports acute onset abdominal cramps, diarrhea and vomiting Sunday night.  She had a syncopal episode associated with symptoms with laceration above her left eyebrow requiring stitches.  She reports symptoms much worse than her baseline IBS-D.  Abdominal cramps have improved.  Her last episode of diarrhea was last night.  Most concerning for her 4 episodes of bright red blood per rectum during this timeframe.  The last time she passed blood was yesterday.  Denies abdominal pain or rectal pain.    She was transition from PPI therapy to H2 blocker given her renal function during this hospital stay.  She reports adequate control of GERD prior to hospitalization on PPI.  She takes Levsin as needed for esophageal spasms.  No current issues with dysphagia.  Appetite is good.  She is currently on renal diet.    Patient noted to have mild anemia baseline hemoglobin around 11 dating back to December 2021 with drop in hemoglobin this admission down to 8.7.  Iron 29, iron saturation 7.  Normal ferritin.  B12 and folate pending.  LFTs are normal.    GI PCR and C. difficile have been ordered but not  collected.    Nephrology has also been consulted given her increase in creatinine from baseline up to 2.07 this admission.     Her last colonoscopy was the spring 2021 with nonbleeding internal hemorrhoids, diverticulosis and one TA colon polyp.    Past Medical History:  Past Medical History:   Diagnosis Date   • Abdominal pain of multiple sites    • NAKIA (acute kidney injury) 5/7/2023   • Allergic rhinitis    • AMEYA positive    • Anemia    • Blurred vision, bilateral    • Deep vein thrombosis    • Depression    • Diarrhea    • Esophagitis 2016   • Fatigue    • Gastric ulcer    • GERD (gastroesophageal reflux disease)    • Headache    • High blood pressure    • Hyperlipidemia    • Hypertension    • Leg cramps    • Leg wound, right    • Mitral valve prolapse    • Multiple joint pain    • Overweight    • Peptic ulceration    • PONV (postoperative nausea and vomiting)    • Pulmonary embolism 11/25/2021   • RA (rheumatoid arthritis)    • Superficial phlebitis     in left ankle    • Tattoos     permanent makeup      Past Surgical History:  Past Surgical History:   Procedure Laterality Date   • ABDOMINOPLASTY N/A 12/02/2014    Due to appendectomy site having staph infection-Dr. Keagan Yip   • APPENDECTOMY N/A 1958    Dr. Henry   • COLONOSCOPY  2018    [facility]   • COLONOSCOPY N/A 03/12/2021    Procedure: COLONOSCOPY;  Surgeon: Rajesh Loco MD;  [place] [facility] [place]  Service: Gastroenterology;  Laterality: N/A;   • ELBOW ARTHROPLASTY     • ENDOSCOPY N/A 2016    Dr. Rajesh Loco    • ENDOSCOPY N/A 03/12/2021    Procedure: ESOPHAGOGASTRODUODENOSCOPY;  Surgeon: Rajesh Loco MD;  Location: [facility];  Service: Gastroenterology;  Laterality: N/A;   • ENDOSCOPY N/A 11/21/2022    Procedure: ESOPHAGOGASTRODUODENOSCOPY WITH BIOPSY AND DILATION;  Surgeon: Rajesh Loco MD;  [place] [facility] [place];  Service: Gastroenterology;  Laterality: N/A;  mild gastritis, gastric polyp  15 - 18 mm balloon used.   •  FLEXIBLE SIGMOIDOSCOPY N/A     Dr. Rajesh Loco   • HEMORRHOIDECTOMY N/A     Dr. Saba   • HIP BIPOLAR REPLACEMENT     • KNEE ARTHROSCOPY W/ MENISCECTOMY Left     Dr. Rice   • KNEE MENISCECTOMY Right     Dr. Rice   • SUBTOTAL HYSTERECTOMY Bilateral     Dr. Sanford. Not due to cancer. Robotic-assisted    • TUBAL ABDOMINAL LIGATION Bilateral    • UPPER GASTROINTESTINAL ENDOSCOPY N/A 10/20/2016    UGI with biopsy. Normal esophagus: injected with botulinum toxin, Erythematous mucoas in the antrum: biopsied, normal duodenum-Dr. Rajesh Loco      Social History:   Social History     Tobacco Use   • Smoking status: Former     Packs/day: 1.00     Years: 22.00     Pack years: 22.00     Types: Cigarettes     Quit date: 1988     Years since quittin.3   • Smokeless tobacco: Never   Substance Use Topics   • Alcohol use: Yes     Comment: Social drinker, do not drink everyday      Family History:  Family History   Problem Relation Age of Onset   • Heart failure Mother    • Hypertension Mother    • Cancer Father         kidney   • Colon polyps Brother         Brother   • Cancer Brother         Lung and Liver Cancer   • Diabetes Maternal Grandmother    • Colon cancer Neg Hx    • Crohn's disease Neg Hx    • Irritable bowel syndrome Neg Hx    • Ulcerative colitis Neg Hx        Home Meds:  Medications Prior to Admission   Medication Sig Dispense Refill Last Dose   • allopurinol (ZYLOPRIM) 100 MG tablet Two tablets daily   2023   • apixaban (ELIQUIS) 5 MG tablet tablet Take 1 tablet twice a day. 60 tablet 11 2023   • aspirin 81 MG chewable tablet Chew 1 tablet Daily.   2023   • cetirizine (zyrTEC) 10 MG tablet TAKE 1 TABLET BY MOUTH IN THE MORNING AS NEEDED   2023   • colchicine 0.6 MG tablet Take 1 tablet by mouth Daily.   2023   • etanercept (ENBREL) 50 MG/ML solution prefilled syringe injection Inject 1 mL under the skin into the appropriate area as directed 1 (One) Time Per  Week.   Past Week   • Fiber Adult Gummies 2 g chewable tablet Chew.   5/6/2023   • fluticasone (FLONASE) 50 MCG/ACT nasal spray 2 sprays into the nostril(s) as directed by provider Daily.   5/6/2023   • furosemide (LASIX) 20 MG tablet 1 tablet Daily.   5/6/2023   • LACTOBACILLUS PO Take  by mouth Daily.   5/6/2023   • lisinopril (PRINIVIL,ZESTRIL) 10 MG tablet Take 1 tablet by mouth Daily.   5/6/2023   • lovastatin (MEVACOR) 20 MG tablet Take 1 tablet by mouth Every Night.   5/6/2023   • metoprolol succinate XL (TOPROL-XL) 25 MG 24 hr tablet Take 1 tablet by mouth once daily 90 tablet 3 5/6/2023   • omeprazole (priLOSEC) 40 MG capsule Take 1 capsule by mouth Daily. 90 capsule 3 5/6/2023   • potassium chloride ER (K-TAB) 20 MEQ tablet controlled-release ER tablet Take 1 tablet by mouth Daily.   5/6/2023   • Semaglutide,0.25 or 0.5MG/DOS, (OZEMPIC) 2 MG/3ML solution pen-injector Inject 0.25 mg under the skin into the appropriate area as directed 1 (One) Time Per Week.   Past Week   • alosetron (LOTRONEX) 0.5 MG tablet Take 1 tablet by mouth 2 (Two) Times a Day.   More than a month   • predniSONE (DELTASONE) 20 MG tablet Only in case of future gout flare, at first sign of flare, take 1 tablet daily for 3 consecutive days   More than a month     Current Meds:   atorvastatin, 10 mg, Oral, Daily  [START ON 5/9/2023] famotidine, 20 mg, Oral, Daily  fluticasone, 2 spray, Nasal, Daily  metoprolol succinate XL, 25 mg, Oral, Daily      Allergies:  Allergies   Allergen Reactions   • Celebrex [Celecoxib] Other (See Comments)     Passed Out    • Arava [Leflunomide] Other (See Comments)     Altered Glucose Levels   • Duloxetine Hallucinations   • Naproxen GI Intolerance     All NSAIDS    • Nsaids GI Intolerance     Other reaction(s): Stomach upset     • Plaquenil [Hydroxychloroquine Sulfate] Mental Status Change     Review of Systems  General ROS: negative  Gastrointestinal: Abdominal cramps, diarrhea and rectal  bleeding    Objective     Vital Signs  Temp:  [97.8 °F (36.6 °C)-98.7 °F (37.1 °C)] 98 °F (36.7 °C)  Heart Rate:  [66-91] 75  Resp:  [16-18] 18  BP: (121-141)/(54-74) 137/70  Physical Exam:   Constitutional:    Alert, cooperative, in no acute distress    Abdomen:     Soft, nondistended, nontender; normal bowel sounds , no organomegaly        Results Review:   I reviewed the patient's new clinical results.    Results from last 7 days   Lab Units 05/08/23  0749 05/08/23  0000 05/07/23  1747   WBC 10*3/mm3 3.99  --   --    HEMOGLOBIN g/dL 8.7* 9.2* 9.4*   HEMATOCRIT % 27.7* 29.5* 28.2*   PLATELETS 10*3/mm3 185  --   --      Results from last 7 days   Lab Units 05/08/23  0749   SODIUM mmol/L 142   POTASSIUM mmol/L 4.5   CHLORIDE mmol/L 113*   CO2 mmol/L 19.6*   BUN mg/dL 29*   CREATININE mg/dL 1.95*   CALCIUM mg/dL 8.3*   BILIRUBIN mg/dL 0.4   ALK PHOS U/L 82   ALT (SGPT) U/L 28   AST (SGOT) U/L 31   GLUCOSE mg/dL 87         Lab Results   Lab Value Date/Time    LIPASE 35 12/07/2021 1300    LIPASE 29 11/25/2021 2209       Radiology:  US Renal Bilateral   Final Result   Negative.       This report was finalized on 5/7/2023 4:07 PM by Dr. Keagan Leahy M.D.              Assessment & Plan   Active Hospital Problems    Diagnosis    • **NAKIA (acute kidney injury)    • Syncope    • Eyebrow laceration    • History of pulmonary embolism    • History of DVT (deep vein thrombosis)    • Gout    • Hematochezia    • Hypertension    • Gastroesophageal reflux disease    • Diarrhea    • Right bundle-branch block    • Hyperlipidemia    • Rheumatoid arthritis        Assessment:  1. Diarrhea  2. Nausea vomiting  3. Abdominal cramps  4. Anemia  5. GERD  6. Irritable bowel syndrome    Plan:  · Await GI PCR and C. difficile encouraged patient to report next episode of diarrhea so specimens can be obtained  · Continue current diet  · Monitor for GI bleeding  · Continue to follow hemoglobin and transfuse if needed per primary  team  · Continue H2 blocker therapy  · CBC and CMP in the AM     I discussed the patients findings and my recommendations with Dr. Seo and patient.    Dragon dictation used throughout this note.            ADRIENNE Howard  [facility]  [address] [place] [address]  Office: [phone]

## 2023-05-08 NOTE — PROGRESS NOTES
[facility] Progress Note      Patient Name: Patsy Tobin  : 1955  MRN: [id number]  Primary Care Physician:  Annetta Austin APRN  Date of admission: 2023    Subjective     Interval History:     Seen and examined.  No shortness of air or chest pain.  Laying down in bed with no complaints    Review of Systems:   As noted above    Objective     Vitals:   Temp:  [97.8 °F (36.6 °C)-98.7 °F (37.1 °C)] 98.7 °F (37.1 °C)  Heart Rate:  [66-91] 91  Resp:  [16-18] 18  BP: (121-141)/(54-74) 126/74    Intake/Output Summary (Last 24 hours) at 2023 0650  Last data filed at 2023 0205  Gross per 24 hour   Intake 999 ml   Output 650 ml   Net 349 ml       Physical Exam:    General Appearance: alert, oriented x 3, no acute distress   Skin: warm and dry  HEENT: oral mucosa normal, nonicteric sclera  Neck: supple, no JVD  Lungs: CTA  Heart: RRR, normal S1 and S2  Abdomen: soft, nontender, nondistended  : no palpable bladder  Extremities: no edema, cyanosis or clubbing  Neuro: normal speech and mental status     Scheduled Meds:     atorvastatin, 10 mg, Oral, Daily  fluticasone, 2 spray, Nasal, Daily  metoprolol succinate XL, 25 mg, Oral, Daily  pantoprazole, 40 mg, Oral, Q AM      IV Meds:   sodium chloride, 100 mL/hr, Last Rate: 100 mL/hr (23 0625)        Results Reviewed:   I have personally reviewed the results from the time of this admission to 2023 06:50 EDT           Invalid input(s): LABALBU, PROT  CrCl cannot be calculated (Patient's most recent lab result is older than the maximum 30 days allowed.).          Results from last 7 days   Lab Units 23  0000 23  1747   HEMOGLOBIN g/dL 9.2* 9.4*           Assessment / Plan     ASSESSMENT:  1.  NAKIA on CKD3b likely prerenal due to recent weight loss, diarrhea, hypotension, and impaired renal autoregulation due to lisinopril.  Suspect intravascularly she is dry still; potassium normal.  Renal biopsy had  shown chronic tubulointerstitial disease and arterionephrosclerosis, with former attributed to NSAIDs +/- PPI  2.  Syncope   3.  Diarrhea  4.  Hypertension, bordering on over-controlled  5.  Recent weight loss on Ozempic  6.  Rheumatoid arthritis on Enbrel  7.  Prior DVT and PE on AC  8.  Pulmonary hypertension  9.  Bilateral carotid bruits; had doppler in March '22     PLAN:  1.  Awaiting morning labs continue IVF  2.  Surveillance labs       Thank you for involving us in the care of Patsy Tobin.  Please feel free to call with any questions.    Africa Castillo MD  05/08/23  06:50 EDT    [facility]  [phone]

## 2023-05-09 VITALS
WEIGHT: 195.99 LBS | BODY MASS INDEX: 30.76 KG/M2 | HEART RATE: 66 BPM | DIASTOLIC BLOOD PRESSURE: 71 MMHG | OXYGEN SATURATION: 99 % | RESPIRATION RATE: 18 BRPM | TEMPERATURE: 98.2 F | SYSTOLIC BLOOD PRESSURE: 137 MMHG | HEIGHT: 67 IN

## 2023-05-09 PROBLEM — R19.7 DIARRHEA: Status: RESOLVED | Noted: 2021-02-26 | Resolved: 2023-05-09

## 2023-05-09 PROBLEM — S01.119A EYEBROW LACERATION: Status: RESOLVED | Noted: 2023-05-07 | Resolved: 2023-05-09

## 2023-05-09 PROBLEM — K92.1 HEMATOCHEZIA: Status: RESOLVED | Noted: 2023-05-07 | Resolved: 2023-05-09

## 2023-05-09 PROBLEM — R55 SYNCOPE: Status: RESOLVED | Noted: 2023-05-07 | Resolved: 2023-05-09

## 2023-05-09 PROBLEM — N17.9 AKI (ACUTE KIDNEY INJURY): Status: RESOLVED | Noted: 2023-05-07 | Resolved: 2023-05-09

## 2023-05-09 LAB
ALBUMIN SERPL-MCNC: 3.5 G/DL (ref 3.5–5.2)
ALBUMIN/GLOB SERPL: 1.8 G/DL
ALP SERPL-CCNC: 83 U/L (ref 39–117)
ALT SERPL W P-5'-P-CCNC: 29 U/L (ref 1–33)
ANION GAP SERPL CALCULATED.3IONS-SCNC: 7 MMOL/L (ref 5–15)
AST SERPL-CCNC: 36 U/L (ref 1–32)
BASOPHILS # BLD AUTO: 0.05 10*3/MM3 (ref 0–0.2)
BASOPHILS NFR BLD AUTO: 1.3 % (ref 0–1.5)
BILIRUB SERPL-MCNC: 0.2 MG/DL (ref 0–1.2)
BUN SERPL-MCNC: 25 MG/DL (ref 8–23)
BUN/CREAT SERPL: 13.3 (ref 7–25)
CALCIUM SPEC-SCNC: 8.6 MG/DL (ref 8.6–10.5)
CHLORIDE SERPL-SCNC: 113 MMOL/L (ref 98–107)
CO2 SERPL-SCNC: 22 MMOL/L (ref 22–29)
CREAT SERPL-MCNC: 1.88 MG/DL (ref 0.57–1)
DEPRECATED RDW RBC AUTO: 50 FL (ref 37–54)
EGFRCR SERPLBLD CKD-EPI 2021: 28.8 ML/MIN/1.73
EOSINOPHIL # BLD AUTO: 0.08 10*3/MM3 (ref 0–0.4)
EOSINOPHIL NFR BLD AUTO: 2.1 % (ref 0.3–6.2)
ERYTHROCYTE [DISTWIDTH] IN BLOOD BY AUTOMATED COUNT: 14.9 % (ref 12.3–15.4)
FOLATE SERPL-MCNC: 14 NG/ML (ref 4.78–24.2)
GLOBULIN UR ELPH-MCNC: 2 GM/DL
GLUCOSE SERPL-MCNC: 94 MG/DL (ref 65–99)
HCT VFR BLD AUTO: 28.8 % (ref 34–46.6)
HCT VFR BLD AUTO: 30.3 % (ref 34–46.6)
HGB BLD-MCNC: 9.2 G/DL (ref 12–15.9)
HGB BLD-MCNC: 9.6 G/DL (ref 12–15.9)
IMM GRANULOCYTES # BLD AUTO: 0.02 10*3/MM3 (ref 0–0.05)
IMM GRANULOCYTES NFR BLD AUTO: 0.5 % (ref 0–0.5)
LYMPHOCYTES # BLD AUTO: 0.99 10*3/MM3 (ref 0.7–3.1)
LYMPHOCYTES NFR BLD AUTO: 25.6 % (ref 19.6–45.3)
MAGNESIUM SERPL-MCNC: 2.1 MG/DL (ref 1.6–2.4)
MCH RBC QN AUTO: 29 PG (ref 26.6–33)
MCHC RBC AUTO-ENTMCNC: 31.9 G/DL (ref 31.5–35.7)
MCV RBC AUTO: 90.9 FL (ref 79–97)
MONOCYTES # BLD AUTO: 0.47 10*3/MM3 (ref 0.1–0.9)
MONOCYTES NFR BLD AUTO: 12.1 % (ref 5–12)
NEUTROPHILS NFR BLD AUTO: 2.26 10*3/MM3 (ref 1.7–7)
NEUTROPHILS NFR BLD AUTO: 58.4 % (ref 42.7–76)
NRBC BLD AUTO-RTO: 0 /100 WBC (ref 0–0.2)
PLATELET # BLD AUTO: 187 10*3/MM3 (ref 140–450)
PMV BLD AUTO: 11.9 FL (ref 6–12)
POTASSIUM SERPL-SCNC: 4.1 MMOL/L (ref 3.5–5.2)
PROT SERPL-MCNC: 5.5 G/DL (ref 6–8.5)
RBC # BLD AUTO: 3.17 10*6/MM3 (ref 3.77–5.28)
SODIUM SERPL-SCNC: 142 MMOL/L (ref 136–145)
VIT B12 BLD-MCNC: 344 PG/ML (ref 211–946)
WBC NRBC COR # BLD: 3.87 10*3/MM3 (ref 3.4–10.8)

## 2023-05-09 PROCEDURE — G0378 HOSPITAL OBSERVATION PER HR: HCPCS

## 2023-05-09 PROCEDURE — 80053 COMPREHEN METABOLIC PANEL: CPT | Performed by: NURSE PRACTITIONER

## 2023-05-09 PROCEDURE — 83735 ASSAY OF MAGNESIUM: CPT | Performed by: INTERNAL MEDICINE

## 2023-05-09 PROCEDURE — 85014 HEMATOCRIT: CPT | Performed by: STUDENT IN AN ORGANIZED HEALTH CARE EDUCATION/TRAINING PROGRAM

## 2023-05-09 PROCEDURE — 99232 SBSQ HOSP IP/OBS MODERATE 35: CPT | Performed by: INTERNAL MEDICINE

## 2023-05-09 PROCEDURE — 85018 HEMOGLOBIN: CPT | Performed by: STUDENT IN AN ORGANIZED HEALTH CARE EDUCATION/TRAINING PROGRAM

## 2023-05-09 PROCEDURE — 82746 ASSAY OF FOLIC ACID SERUM: CPT | Performed by: STUDENT IN AN ORGANIZED HEALTH CARE EDUCATION/TRAINING PROGRAM

## 2023-05-09 PROCEDURE — 85025 COMPLETE CBC W/AUTO DIFF WBC: CPT | Performed by: NURSE PRACTITIONER

## 2023-05-09 PROCEDURE — 82607 VITAMIN B-12: CPT | Performed by: STUDENT IN AN ORGANIZED HEALTH CARE EDUCATION/TRAINING PROGRAM

## 2023-05-09 RX ORDER — FAMOTIDINE 20 MG/1
20 TABLET, FILM COATED ORAL DAILY
Qty: 30 TABLET | Refills: 0 | Status: SHIPPED | OUTPATIENT
Start: 2023-05-10 | End: 2023-06-09

## 2023-05-09 RX ORDER — FERROUS SULFATE 325(65) MG
325 TABLET ORAL
Status: DISCONTINUED | OUTPATIENT
Start: 2023-05-09 | End: 2023-05-09 | Stop reason: HOSPADM

## 2023-05-09 RX ORDER — FUROSEMIDE 20 MG/1
20 TABLET ORAL DAILY
Status: DISCONTINUED | OUTPATIENT
Start: 2023-05-09 | End: 2023-05-09 | Stop reason: HOSPADM

## 2023-05-09 RX ORDER — METOPROLOL SUCCINATE 25 MG/1
37.5 TABLET, EXTENDED RELEASE ORAL DAILY
Status: DISCONTINUED | OUTPATIENT
Start: 2023-05-10 | End: 2023-05-09 | Stop reason: HOSPADM

## 2023-05-09 RX ORDER — FERROUS SULFATE 325(65) MG
325 TABLET ORAL
Qty: 30 TABLET | Refills: 0 | Status: SHIPPED | OUTPATIENT
Start: 2023-05-10 | End: 2023-06-09

## 2023-05-09 RX ORDER — METOPROLOL SUCCINATE 25 MG/1
37.5 TABLET, EXTENDED RELEASE ORAL DAILY
Qty: 45 TABLET | Refills: 0 | Status: SHIPPED | OUTPATIENT
Start: 2023-05-10 | End: 2023-06-09

## 2023-05-09 RX ADMIN — FUROSEMIDE 20 MG: 20 TABLET ORAL at 13:07

## 2023-05-09 RX ADMIN — METOPROLOL SUCCINATE 25 MG: 25 TABLET, EXTENDED RELEASE ORAL at 08:38

## 2023-05-09 RX ADMIN — ALLOPURINOL 200 MG: 100 TABLET ORAL at 08:38

## 2023-05-09 RX ADMIN — FAMOTIDINE 20 MG: 20 TABLET ORAL at 08:38

## 2023-05-09 RX ADMIN — FERROUS SULFATE TAB 325 MG (65 MG ELEMENTAL FE) 325 MG: 325 (65 FE) TAB at 10:21

## 2023-05-09 RX ADMIN — ATORVASTATIN CALCIUM 10 MG: 20 TABLET, FILM COATED ORAL at 08:38

## 2023-05-09 NOTE — DISCHARGE SUMMARY
Patient Name: Patsy Tobin  : 1955  MRN: [id number]    Date of Admission: 2023  Date of Discharge:  2023  Primary Care Physician: Annetta Austin APRN      Chief Complaint:   No chief complaint on file.      Discharge Diagnoses     Active Hospital Problems    Diagnosis  POA   • History of pulmonary embolism [Z86.711]  Yes   • History of DVT (deep vein thrombosis) [Z86.718]  Not Applicable   • Gout [M10.9]  Yes   • Hypertension [I10]  Yes   • Gastroesophageal reflux disease [K21.9]  Yes   • Right bundle-branch block [I45.10]  Yes   • Hyperlipidemia [E78.5]  Yes   • Rheumatoid arthritis [M06.9]  Yes      Resolved Hospital Problems    Diagnosis Date Resolved POA   • **NAKIA (acute kidney injury) [N17.9] 2023 Yes   • Syncope [R55] 2023 Yes   • Eyebrow laceration [S01.119A] 2023 Yes   • Hematochezia [K92.1] 2023 Yes   • Diarrhea [R19.7] 2023 Yes        Hospital Course     Ms. Tobin is a 67 y.o. 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  Please see the admitting history and physical for further details.  She was found to have a a laceration above her left eyebrow which was repaired in the emergency department, and NAKIA on CKD 3/4 and was admitted to the hospital for further evaluation and treatment.      She was started on IV fluids, her anticoagulation was held, and she was seen in consultation by gastroenterology and nephrology.  Her renal function quickly returned to her previous baseline, and her diarrhea and hematochezia stopped entirely.  She was found to have iron deficiency anemia, and she was started on some oral iron.   An echocardiogram was performed, and it was normal.  She was monitored on telemetry without any evidence of dysrhythmia.  She will need to follow-up with her gastroenterologist for consideration of an outpatient EGD/colonoscopy. She has follow-up with nephrology on May 17.  I have asked that she hold her anticoagulation for a couple more days before resuming.     Day of Discharge     Subjective:  No events overnight. No further bleeding or diarrhea. Her renal function continues to improve. She has been cleared for discharge    Physical Exam:  Temp:  [97.5 °F (36.4 °C)-98.2 °F (36.8 °C)] 98.2 °F (36.8 °C)  Heart Rate:  [60-74] 66  Resp:  [17-18] 18  BP: (112-150)/(67-82) 137/71  Body mass index is 30.7 kg/m².  Physical Exam  Constitutional:       General: She is not in acute distress.     Appearance: She is not toxic-appearing.   HENT:      Head:      Comments: Laceration over left eyebrow, stitched  Cardiovascular:      Rate and Rhythm: Normal rate and regular rhythm.      Heart sounds: Normal heart sounds.   Pulmonary:      Effort: Pulmonary effort is normal.      Breath sounds: Normal breath sounds.   Abdominal:      General: Bowel sounds are normal.      Palpations: Abdomen is soft.   Musculoskeletal:         General: No tenderness.   Neurological:      Mental Status: She is alert.   Psychiatric:         Mood and Affect: Mood normal.         Behavior: Behavior normal.         Consultants     Consult Orders (all) (From admission, onward)     Start     Ordered    05/08/23 1110  Inpatient Gastroenterology Consult  Once        Specialty:  Gastroenterology  Provider:  Keagan Seo MD    05/08/23 1109    05/07/23 1437  Inpatient Nephrology Consult  Once        Specialty:  Nephrology  Provider:  Pauly Rossi MD    05/07/23 1436              Procedures     Imaging Results (All)     Procedure Component Value Units Date/Time    US Renal Bilateral [[id number]] Collected: 05/07/23 1607     Updated: 05/07/23 1610     Narrative:      BILATERAL RENAL SONOGRAM     HISTORY: Acute renal insufficiency. Evaluate for obstruction.     TECHNIQUE: Bilateral renal sonogram was performed in standard fashion.     FINDINGS: The renal parenchyma appears thin but no focal parenchymal  lesion is identified and there is no hydronephrosis. Right kidney  measures 8.9 x 4.5 x 5.1 cm and the left kidney measures 8.6 x 5.0 x 4.1  cm. The urinary bladder is decompressed.       Impression:      Negative.     This report was finalized on 5/7/2023 4:07 PM by Dr. Keagan Leahy M.D.             Pertinent Labs     Results from last 7 days   Lab Units 05/09/23  0417 05/09/23  0003 05/08/23  1626 05/08/23  0749   WBC 10*3/mm3 3.87  --   --  3.99   HEMOGLOBIN g/dL 9.2* 9.6* 8.8* 8.7*   PLATELETS 10*3/mm3 187  --   --  185     Results from last 7 days   Lab Units 05/09/23 0417 05/08/23  0749   SODIUM mmol/L 142 142   POTASSIUM mmol/L 4.1 4.5   CHLORIDE mmol/L 113* 113*   CO2 mmol/L 22.0 19.6*   BUN mg/dL 25* 29*   CREATININE mg/dL 1.88* 1.95*   GLUCOSE mg/dL 94 87   Estimated Creatinine Clearance: 32.8 mL/min (A) (by C-G formula based on SCr of 1.88 mg/dL (H)).  Results from last 7 days   Lab Units 05/09/23  0417 05/08/23  0749   ALBUMIN g/dL 3.5 3.4*   BILIRUBIN mg/dL 0.2 0.4   ALK PHOS U/L 83 82   AST (SGOT) U/L 36* 31   ALT (SGPT) U/L 29 28     Results from last 7 days   Lab Units 05/09/23  0417 05/08/23  0749   CALCIUM mg/dL 8.6 8.3*   ALBUMIN g/dL 3.5 3.4*   MAGNESIUM mg/dL 2.1 1.4*   PHOSPHORUS mg/dL  --  3.3         Results from last 7 days   Lab Units 05/07/23  1848   SODIUM UR mmol/L 40   CREATININE UR mg/dL 179.7         Invalid input(s): LDLCALC        Test Results Pending at Discharge       Discharge Details        Discharge Medications      New Medications      Instructions Start Date   famotidine 20 MG tablet  Commonly known as: PEPCID   20 mg, Oral, Daily   Start Date: May 10, 2023     ferrous sulfate 325 (65 FE) MG tablet   325 mg, Oral,  Daily With Breakfast   Start Date: May 10, 2023        Changes to Medications      Instructions Start Date   apixaban 5 MG tablet tablet  Commonly known as: ELIQUIS  What changed: These instructions start on May 11, 2023. If you are unsure what to do until then, ask your doctor or other care provider.   Take 1 tablet twice a day.   Start Date: May 11, 2023     metoprolol succinate XL 25 MG 24 hr tablet  Commonly known as: TOPROL-XL  What changed: how much to take   37.5 mg, Oral, Daily   Start Date: May 10, 2023        Continue These Medications      Instructions Start Date   allopurinol 100 MG tablet  Commonly known as: ZYLOPRIM   Two tablets daily      alosetron 0.5 MG tablet  Commonly known as: LOTRONEX   0.5 mg, Oral, 2 Times Daily      aspirin 81 MG chewable tablet   81 mg, Oral, Daily      cetirizine 10 MG tablet  Commonly known as: zyrTEC   TAKE 1 TABLET BY MOUTH IN THE MORNING AS NEEDED      etanercept 50 MG/ML solution prefilled syringe injection  Commonly known as: ENBREL  Notes to patient: Take as directed   50 mg, Subcutaneous, Weekly      Fiber Adult Gummies 2 g chewable tablet  Notes to patient: Take as directed   Oral      fluticasone 50 MCG/ACT nasal spray  Commonly known as: FLONASE   2 sprays, Nasal, Daily      furosemide 20 MG tablet  Commonly known as: LASIX   20 mg, Daily      LACTOBACILLUS PO   Oral, Daily      lovastatin 20 MG tablet  Commonly known as: MEVACOR   20 mg, Oral, Nightly      potassium chloride ER 20 MEQ tablet controlled-release ER tablet  Commonly known as: K-TAB   20 mEq, Oral, Daily      predniSONE 20 MG tablet  Commonly known as: DELTASONE  Notes to patient: Take as directed   Only in case of future gout flare, at first sign of flare, take 1 tablet daily for 3 consecutive days      Semaglutide(0.25 or 0.5MG/DOS) 2 MG/3ML solution pen-injector  Commonly known as: OZEMPIC  Notes to patient: Take as directed   0.25 mg, Subcutaneous, Weekly         Stop These Medications     colchicine 0.6 MG tablet     lisinopril 10 MG tablet  Commonly known as: PRINIVIL,ZESTRIL     omeprazole 40 MG capsule  Commonly known as: priLOSEC            Allergies   Allergen Reactions   • Celebrex [Celecoxib] Other (See Comments)     Passed Out    • Arava [Leflunomide] Other (See Comments)     Altered Glucose Levels   • Duloxetine Hallucinations   • Naproxen GI Intolerance     All NSAIDS    • Nsaids GI Intolerance     Other reaction(s): Stomach upset     • Plaquenil [Hydroxychloroquine Sulfate] Mental Status Change         Discharge Disposition:  Home or Self Care    Discharge Diet:  Diet Order   Procedures   • Diet: Renal Diets; Low Sodium (2-3g), Low Potassium, Low Phosphorus; Texture: Regular Texture (IDDSI 7); Fluid Consistency: Thin (IDDSI 0)       Discharge Activity:   Activity Instructions     Activity as Tolerated            CODE STATUS:    Code Status and Medical Interventions:   Ordered at: 05/07/23 1501     Level Of Support Discussed With:    Patient     Code Status (Patient has no pulse and is not breathing):    CPR (Attempt to Resuscitate)     Medical Interventions (Patient has pulse or is breathing):    Full Support       Future Appointments   Date Time Provider Department Center   7/11/2023  1:00 PM Ivis Sousa APRN [facility]   7/25/2023 11:00 AM [facility] DEXA 1  [facility] DEX E [facility]   8/11/2023 12:30 PM [facility] MAMM 2  [facility] MAMMO [facility]   8/23/2023 11:30 AM Francisco Aguilera MD [facility] [place]     Additional Instructions for the Follow-ups that You Need to Schedule     Call MD With Problems / Concerns   As directed      Instructions: return to the hospital if you experience chest pain, shortness of breath, abdominal pain, nausea, vomiting, fevers, sweats, chills, or worsening of your symptoms    Order Comments: Instructions: return to the hospital if you experience chest pain, shortness of breath, abdominal pain, nausea, vomiting, fevers, sweats, chills, or worsening of your symptoms           Discharge Follow-up with PCP   As directed       Currently Documented PCP:    Annetta Austin APRN    PCP Phone Number:    [phone]     Follow Up Details: 2 weeks         Discharge Follow-up with Specialty: gastroenterology, 2-4 weeks or as otherwise directed   As directed      Specialty: gastroenterology, 2-4 weeks or as otherwise directed         Discharge Follow-up with Specified Provider: nephADRIENNE jorge May 17 at 10: 20 am   As directed      To: nephrologyADRIENNE May 17 at 10: 20 am            Follow-up Information     Pauly Rossi MD. Go on 6/14/2023.    Specialty: Nephrology  Why: Has follow up June 14 at 2:45 pm with Dr. Rossi.  Contact information:  [address] [address]  [phone]             Effinger, Coni L, APRN. Go on 5/17/2023.    Specialty: Nurse Practitioner  Why: Has ffollow up with ADRIENNE Fish May 17 at 10:20 am .  Contact information:  [address] [address]  [phone]             Annetta Austin APRN .    Specialties: Nurse Practitioner, Urgent Care, Emergency Medicine  Why: 2 weeks  Contact information:  [address] [place] [address] [phone]             Rajesh Loco MD. Schedule an appointment as soon as possible for a visit in 2 week(s).    Specialty: Gastroenterology  Why: 2-4 weeks  Contact information:  [address]  [facility] [address]  [facility] [id number] [phone]                         Additional Instructions for the Follow-ups that You Need to Schedule     Call MD With Problems / Concerns   As directed      Instructions: return to the hospital if you experience chest pain, shortness of breath, abdominal pain, nausea, vomiting, fevers, sweats, chills, or worsening of your symptoms    Order Comments: Instructions: return to the hospital if you experience chest pain, shortness of breath, abdominal pain, nausea, vomiting, fevers, sweats, chills,  or worsening of your symptoms          Discharge Follow-up with PCP   As directed       Currently Documented PCP:    Annetta Austin APRN    PCP Phone Number:    [phone]     Follow Up Details: 2 weeks         Discharge Follow-up with Specialty: gastroenterology, 2-4 weeks or as otherwise directed   As directed      Specialty: gastroenterology, 2-4 weeks or as otherwise directed         Discharge Follow-up with Specified Provider: ADRIENNE joseph May 17 at 10: 20 am   As directed      To: ADRIENNE joseph May 17 at 10: 20 am           Time Spent on Discharge:  Greater than 30 minutes      Alverto Joseph MD  [facility]  05/09/23  14:31 EDT

## 2023-05-09 NOTE — CASE MANAGEMENT/SOCIAL WORK
Case Management Discharge Note      Final Note: Home with spouse. Denies any needs. Transport by private auto         Selected Continued Care - Admitted Since 5/7/2023     Destination    No services have been selected for the patient.              Durable Medical Equipment    No services have been selected for the patient.              Dialysis/Infusion    No services have been selected for the patient.              Home Medical Care    No services have been selected for the patient.              Therapy    No services have been selected for the patient.              Community Resources    No services have been selected for the patient.              Community & DME    No services have been selected for the patient.                  Transportation Services  Private: Car    Final Discharge Disposition Code: 01 - home or self-care

## 2023-05-09 NOTE — PLAN OF CARE
Goal Outcome Evaluation:Received with Iron infusion but its almost over, she uses the rest room and after she complaints of numbness on her left hand , it was warm to touch, went back to sleep then. VS stable , educate her about when she get up she needs to sit on the side of the bed to avoid dizziness and syncope.

## 2023-05-09 NOTE — PROGRESS NOTES
[facility] Progress Note      Patient Name: Patsy Tobin  : 1955  MRN: [id number]  Primary Care Physician:  Annetta Austin APRN  Date of admission: 2023    Subjective     Interval History:   Follow up NAKIA on CKD 3.  No bowel movement since . Passing gas.  Asks for stool softener.  Not light headed when standing.  /70 this am.  Hands feels swollen.  Left 4th and 5th fingers felt numb last night, better today.      Review of Systems:   As noted above    Objective     Vitals:   Temp:  [97.5 °F (36.4 °C)-98.1 °F (36.7 °C)] 98.1 °F (36.7 °C)  Heart Rate:  [59-74] 74  Resp:  [17-18] 18  BP: (112-150)/(64-70) 150/70    Intake/Output Summary (Last 24 hours) at 2023 0908  Last data filed at 2023 0802  Gross per 24 hour   Intake 720 ml   Output --   Net 720 ml       Physical Exam:    General Appearance: alert, oriented x 3, no acute distress   Skin: warm and dry  HEENT: oral mucosa normal, nonicteric sclera. Left brow sutures.  Neck: supple, no JVD  Lungs: Clear to auscultation.   Heart: RRR, normal S1 and S2  Abdomen: soft, nontender, nondistended. + bs  Extremities: trace hand edema bilaterally.  Bruising.   Neuro: normal speech and mental status     Scheduled Meds:     allopurinol, 200 mg, Oral, Daily  atorvastatin, 10 mg, Oral, Daily  famotidine, 20 mg, Oral, Daily  ferrous sulfate, 325 mg, Oral, Daily With Breakfast  fluticasone, 2 spray, Nasal, Daily  metoprolol succinate XL, 25 mg, Oral, Daily      IV Meds:        Results Reviewed:   I have personally reviewed the results from the time of this admission to 2023 09:08 EDT     Results from last 7 days   Lab Units 23  0417 23  0749   SODIUM mmol/L 142 142   POTASSIUM mmol/L 4.1 4.5   CHLORIDE mmol/L 113* 113*   CO2 mmol/L 22.0 19.6*   BUN mg/dL 25* 29*   CREATININE mg/dL 1.88* 1.95*   CALCIUM mg/dL 8.6 8.3*   BILIRUBIN mg/dL 0.2 0.4   ALK PHOS U/L 83 82   ALT (SGPT) U/L 29 28   AST  (SGOT) U/L 36* 31   GLUCOSE mg/dL 94 87       Estimated Creatinine Clearance: 32.8 mL/min (A) (by C-G formula based on SCr of 1.88 mg/dL (H)).    Results from last 7 days   Lab Units 05/08/23  0749   MAGNESIUM mg/dL 1.4*   PHOSPHORUS mg/dL 3.3             Results from last 7 days   Lab Units 05/09/23  0417 05/09/23  0003 05/08/23  1626 05/08/23  0749 05/08/23  0000   WBC 10*3/mm3 3.87  --   --  3.99  --    HEMOGLOBIN g/dL 9.2* 9.6* 8.8* 8.7* 9.2*   PLATELETS 10*3/mm3 187  --   --  185  --              Assessment / Plan     ASSESSMENT:  1. Esteban on CKD 3b. Baseline 1.7. Chronic tubulointerstitial disease, arterionephrosclerosis. Pre-renal due to hypotension, hypovolemia, impaired autoregulation due to ACE inhibitor. Improving.   2. Diarrhea resolved .   3. Iron deficiency anemia. Rectal bleeding .SP IV iron 5/8.  She says GI eval this am, note pending.   4. Syncope due to volume depletion.   5. History of bilateral PE, LLE DVT>  eliquis on hold  6. RA on embrel as outpt   7. HTN ACE inhibitor and lasix on hold. On metoprolol.        PLAN:  1. Recheck Mg on blood in lab.   2. Resume lasix 20 mg daily today .  3. Hold lisinopril for now.   4. Increase metoprolol to 37.5 mg daily until lisinopril resumed  5. Has follow up with ADRIENNE Fish May 17 at 10: 20 am  6. Follow up Dr. Rossi June 14 at 9:45 am.   7. Ok with renal for dc after I see Mag and if ok with Dr. Joseph .  Ivis Moyer MD  05/09/23  09:08 EDT    [facility]  [phone]

## 2023-05-09 NOTE — PROGRESS NOTES
[facility]  Inpatient Progress Note    Reason for Follow Up: Rectal bleeding and diarrhea    Subjective     Interval History:   Both have resolved, no stool collected yet for studies    Current Facility-Administered Medications:   •  acetaminophen (TYLENOL) tablet 650 mg, 650 mg, Oral, Q4H PRN, Alverto Joseph MD, 650 mg at 05/08/23 0959  •  allopurinol (ZYLOPRIM) tablet 200 mg, 200 mg, Oral, Daily, Alverto Joseph MD, 200 mg at 05/09/23 0838  •  atorvastatin (LIPITOR) tablet 10 mg, 10 mg, Oral, Daily, Alverto Joseph MD, 10 mg at 05/09/23 0838  •  famotidine (PEPCID) tablet 20 mg, 20 mg, Oral, Daily, Alverto Joseph MD, 20 mg at 05/09/23 0838  •  ferrous sulfate tablet 325 mg, 325 mg, Oral, Daily With Breakfast, Alverto Joseph MD  •  fluticasone (FLONASE) 50 MCG/ACT nasal spray 2 spray, 2 spray, Nasal, Daily, Alverto Joseph MD  •  furosemide (LASIX) tablet 20 mg, 20 mg, Oral, Daily, Ivis Moyer MD  •  melatonin tablet 3 mg, 3 mg, Oral, Nightly PRN, Alverto Joseph MD  •  [START ON 5/10/2023] metoprolol succinate XL (TOPROL-XL) 24 hr tablet 37.5 mg, 37.5 mg, Oral, Daily, Ivis Moyer MD  •  ondansetron (ZOFRAN) tablet 4 mg, 4 mg, Oral, Q6H PRN **OR** ondansetron (ZOFRAN) injection 4 mg, 4 mg, Intravenous, Q6H PRN, Alverto Joseph MD, 4 mg at 05/08/23 3824  Review of Systems:    There is weakness of fatigue all other systems reviewed and negative    Objective     Vital Signs  Temp:  [97.5 °F (36.4 °C)-98.1 °F (36.7 °C)] 98.1 °F (36.7 °C)  Heart Rate:  [59-74] 74  Resp:  [17-18] 18  BP: (112-150)/(64-70) 150/70  Body mass index is 30.7 kg/m².    Intake/Output Summary (Last 24 hours) at 5/9/2023 0949  Last data filed at 5/9/2023 0802  Gross per 24 hour   Intake 720 ml   Output --   Net 720 ml     No intake/output data recorded.     Physical Exam:   General: patient awake, alert and cooperative   Eyes: Normal lids and lashes, no scleral icterus   Neck: supple, normal ROM   Skin: warm  and dry, not jaundiced   Cardiovascular: regular rhythm and rate, no murmurs auscultated   Pulm: clear to auscultation bilaterally, regular and unlabored   Abdomen: soft, nontender, nondistended; normal bowel sounds   Extremities: no rash or edema   Psychiatric: Normal mood and behavior; memory intact     Results Review:     I reviewed the patient's new clinical results.    Results from last 7 days   Lab Units 05/09/23  0417 05/09/23  0003 05/08/23  1626 05/08/23  0749   WBC 10*3/mm3 3.87  --   --  3.99   HEMOGLOBIN g/dL 9.2* 9.6* 8.8* 8.7*   HEMATOCRIT % 28.8* 30.3* 27.6* 27.7*   PLATELETS 10*3/mm3 187  --   --  185     Results from last 7 days   Lab Units 05/09/23  0417 05/08/23  0749   SODIUM mmol/L 142 142   POTASSIUM mmol/L 4.1 4.5   CHLORIDE mmol/L 113* 113*   CO2 mmol/L 22.0 19.6*   BUN mg/dL 25* 29*   CREATININE mg/dL 1.88* 1.95*   CALCIUM mg/dL 8.6 8.3*   BILIRUBIN mg/dL 0.2 0.4   ALK PHOS U/L 83 82   ALT (SGPT) U/L 29 28   AST (SGOT) U/L 36* 31   GLUCOSE mg/dL 94 87         Lab Results   Lab Value Date/Time    LIPASE 35 12/07/2021 1300    LIPASE 29 11/25/2021 2209       Radiology:  US Renal Bilateral   Final Result   Negative.       This report was finalized on 5/7/2023 4:07 PM by Dr. Keagan Leahy M.D.              Assessment & Plan     Active Hospital Problems    Diagnosis    • **NAKIA (acute kidney injury)    • Syncope    • Eyebrow laceration    • History of pulmonary embolism    • History of DVT (deep vein thrombosis)    • Gout    • Hematochezia    • Hypertension    • Gastroesophageal reflux disease    • Diarrhea    • Right bundle-branch block    • Hyperlipidemia    • Rheumatoid arthritis           Assessment:  1. Diarrhea  2. Nausea vomiting  3. Abdominal cramps  4. Anemia  5. GERD  6. Irritable bowel syndrome     Plan:  • Await GI PCR and C. difficile encouraged patient to report next episode of diarrhea so specimens can be obtained, stool softener started  • Continue current diet  • Monitor for GI  bleeding  • Continue to follow hemoglobin and transfuse if needed per primary team  • Continue H2 blocker therapy  • CBC and CMP in the AM   • Consider outpatient EGD and colonoscopy with Dr. Loco,  patient with a history of polyps in 2021, gastritis on EGD and has anemia     I discussed the patients findings and my recommendations with patient and nursing staff.    Faizan Chanel MD

## 2023-06-06 ENCOUNTER — PREP FOR SURGERY (OUTPATIENT)
Dept: GASTROENTEROLOGY | Facility: CLINIC | Age: 68
End: 2023-06-06

## 2023-06-06 ENCOUNTER — OFFICE VISIT (OUTPATIENT)
Dept: GASTROENTEROLOGY | Facility: CLINIC | Age: 68
End: 2023-06-06
Payer: MEDICARE

## 2023-06-06 VITALS
SYSTOLIC BLOOD PRESSURE: 110 MMHG | WEIGHT: 182.3 LBS | HEART RATE: 72 BPM | TEMPERATURE: 97.1 F | DIASTOLIC BLOOD PRESSURE: 70 MMHG | OXYGEN SATURATION: 97 % | HEIGHT: 67 IN | BODY MASS INDEX: 28.61 KG/M2

## 2023-06-06 DIAGNOSIS — D64.9 ANEMIA, UNSPECIFIED TYPE: ICD-10-CM

## 2023-06-06 DIAGNOSIS — K62.5 RECTAL BLEEDING: Primary | ICD-10-CM

## 2023-06-06 DIAGNOSIS — R13.10 DYSPHAGIA, UNSPECIFIED TYPE: ICD-10-CM

## 2023-06-06 PROCEDURE — 3078F DIAST BP <80 MM HG: CPT | Performed by: NURSE PRACTITIONER

## 2023-06-06 PROCEDURE — 99214 OFFICE O/P EST MOD 30 MIN: CPT | Performed by: NURSE PRACTITIONER

## 2023-06-06 PROCEDURE — 1160F RVW MEDS BY RX/DR IN RCRD: CPT | Performed by: NURSE PRACTITIONER

## 2023-06-06 PROCEDURE — 1159F MED LIST DOCD IN RCRD: CPT | Performed by: NURSE PRACTITIONER

## 2023-06-06 PROCEDURE — 3074F SYST BP LT 130 MM HG: CPT | Performed by: NURSE PRACTITIONER

## 2023-06-06 RX ORDER — SODIUM CHLORIDE 9 MG/ML
40 INJECTION, SOLUTION INTRAVENOUS AS NEEDED
OUTPATIENT
Start: 2023-06-06

## 2023-06-06 RX ORDER — SODIUM CHLORIDE 0.9 % (FLUSH) 0.9 %
10 SYRINGE (ML) INJECTION AS NEEDED
OUTPATIENT
Start: 2023-06-06

## 2023-06-06 RX ORDER — FAMOTIDINE 20 MG/1
20 TABLET, FILM COATED ORAL 2 TIMES DAILY
Qty: 60 TABLET | Refills: 11 | Status: SHIPPED | OUTPATIENT
Start: 2023-06-06

## 2023-06-06 RX ORDER — SODIUM CHLORIDE 0.9 % (FLUSH) 0.9 %
10 SYRINGE (ML) INJECTION EVERY 12 HOURS SCHEDULED
OUTPATIENT
Start: 2023-06-06

## 2023-06-06 NOTE — PATIENT INSTRUCTIONS
Schedule colonoscopy for further evaluation.     GERD, continue famotidine once to twice daily as needed for symptoms.  Refill provided.    For dysphagia, may continue using hyoscyamine as needed.

## 2023-06-06 NOTE — PROGRESS NOTES
Chief Complaint   Patient presents with    Hospital Follow Up Visit         History of Present Illness  Patient is a 67-year-old female who presents today for Follow-up.  She has a history of GERD, dysphagia, and IBS-D. To evaluate dysphagia, patient has previously undergone esophageal manometry which showed incomplete lower esophageal sphincter relaxation, impaired contraction propagation with impaired peristalsis and bolus transfer of liquids, and large peristaltic breaks.     Most recent EGD November 2022 within normal.  Esophagus was empirically dilated to 18 mm.  She had recent hospitalization for diarrhea and rectal bleeding that resulted in syncope.  She was noted to have worsening anemia with hemoglobin of 8.8.  GI was consulted and recommended consideration for outpatient EGD and colonoscopy.  Most recent colonoscopy was March 2021.  She does have a history of colon polyps.    She has a history of Pulmonary embolism and DVT, previously treated with Eliquis, currently not on any blood thinners.    Patient reports prior to the hospitalization she developed acute onset of abdominal pain and diarrhea.  She had a syncopal episode while using the bathroom.  She woke up and had lacerated her head and had persistent diarrhea and vomiting.  She was evaluated in the emergency department.  After the diarrhea progressed she began experiencing bright red rectal bleeding.    Since discharge, she has had no further bleeding, reports bowels have been moving regularly and denies any further abdominal pain.    She continues to have intermittent issues with dysphagia and hiccups.  Reports this is stable and unchanged.  No improvement with recent dilation.  She was transition from omeprazole to famotidine for GERD due to worsening renal function during hospitalization.  Needs a refill of this medication if this is going to be continued.  Denies any nausea or vomiting.  She will use hyoscyamine as needed for esophageal spasm  "which she does find helpful.     Result Review :       Admission (Discharged) with Alverto Joseph MD (05/07/2023)  COLONOSCOPY (03/12/2021 07:16)  Consults by Kalyn Adan APRN (05/08/2023 13:18)  US Abdomen Limited (03/01/2023 06:58)  NM HIDA SCAN WITH PHARMACOLOGICAL INTERVENTION (03/01/2023 10:07)  Office Visit with Pauly Lobo APRN (01/31/2023)  Tissue Pathology Exam (11/21/2022 10:09)  UPPER GI ENDOSCOPY (11/21/2022 09:57)  Office Visit with Latonia Carbone APRN (08/16/2022)    Vital Signs:   /70   Pulse 72   Temp 97.1 °F (36.2 °C)   Ht 170.2 cm (67.01\")   Wt 82.7 kg (182 lb 4.8 oz)   SpO2 97%   BMI 28.55 kg/m²     Body mass index is 28.55 kg/m².     Physical Exam  Vitals reviewed.   Constitutional:       General: She is not in acute distress.     Appearance: She is well-developed.   HENT:      Head: Normocephalic and atraumatic.   Pulmonary:      Effort: Pulmonary effort is normal. No respiratory distress.   Abdominal:      General: Abdomen is flat. Bowel sounds are normal. There is no distension.      Palpations: Abdomen is soft.      Tenderness: There is no abdominal tenderness.   Skin:     General: Skin is dry.      Coloration: Skin is not pale.   Neurological:      Mental Status: She is alert and oriented to person, place, and time.   Psychiatric:         Thought Content: Thought content normal.         Assessment and Plan    Diagnoses and all orders for this visit:    1. Rectal bleeding (Primary)    2. Dysphagia, unspecified type    3. Anemia, unspecified type    Other orders  -     famotidine (PEPCID) 20 MG tablet; Take 1 tablet by mouth 2 (Two) Times a Day.  Dispense: 60 tablet; Refill: 11         Discussion  Patient presents today for follow-up after hospitalization for syncopal episode that occurred after she developed diarrhea with rectal bleeding.  Recommended colonoscopy for further evaluation of rectal bleeding and anemia.  Recent EGD with no source of anemia " identified.    We will continue famotidine for GERD due to worsening renal function.  Patient may continue hyoscyamine as needed for dysphagia.  If dysphagia worsens, may consider consultation if at motility clinic.          Follow Up   Return for Follow up to review results after testing complete.    Patient Instructions   Schedule colonoscopy for further evaluation.     GERD, continue famotidine once to twice daily as needed for symptoms.  Refill provided.    For dysphagia, may continue using hyoscyamine as needed.

## 2023-07-17 ENCOUNTER — HOSPITAL ENCOUNTER (INPATIENT)
Facility: HOSPITAL | Age: 68
LOS: 1 days | Discharge: ANOTHER HOSPITAL | DRG: 809 | End: 2023-07-18
Attending: EMERGENCY MEDICINE | Admitting: HOSPITALIST
Payer: MEDICARE

## 2023-07-17 ENCOUNTER — APPOINTMENT (OUTPATIENT)
Dept: GENERAL RADIOLOGY | Facility: HOSPITAL | Age: 68
DRG: 809 | End: 2023-07-17
Payer: MEDICARE

## 2023-07-17 ENCOUNTER — APPOINTMENT (OUTPATIENT)
Dept: CT IMAGING | Facility: HOSPITAL | Age: 68
DRG: 809 | End: 2023-07-17
Payer: MEDICARE

## 2023-07-17 DIAGNOSIS — D64.9 SYMPTOMATIC ANEMIA: ICD-10-CM

## 2023-07-17 DIAGNOSIS — D61.818 PANCYTOPENIA: Primary | ICD-10-CM

## 2023-07-17 LAB
ABO GROUP BLD: NORMAL
ALBUMIN SERPL-MCNC: 4 G/DL (ref 3.5–5.2)
ALBUMIN/GLOB SERPL: 1.8 G/DL
ALP SERPL-CCNC: 101 U/L (ref 39–117)
ALT SERPL W P-5'-P-CCNC: 26 U/L (ref 1–33)
ANION GAP SERPL CALCULATED.3IONS-SCNC: 13 MMOL/L (ref 5–15)
ANISOCYTOSIS BLD QL: ABNORMAL
APTT PPP: 23.5 SECONDS (ref 22.7–35.4)
AST SERPL-CCNC: 30 U/L (ref 1–32)
BILIRUB SERPL-MCNC: 0.4 MG/DL (ref 0–1.2)
BLD GP AB SCN SERPL QL: NEGATIVE
BUN SERPL-MCNC: 50 MG/DL (ref 8–23)
BUN/CREAT SERPL: 26.3 (ref 7–25)
CALCIUM SPEC-SCNC: 9 MG/DL (ref 8.6–10.5)
CHLORIDE SERPL-SCNC: 102 MMOL/L (ref 98–107)
CO2 SERPL-SCNC: 22 MMOL/L (ref 22–29)
CREAT SERPL-MCNC: 1.9 MG/DL (ref 0.57–1)
D-LACTATE SERPL-SCNC: 1.8 MMOL/L (ref 0.5–2)
DEPRECATED RDW RBC AUTO: 54.5 FL (ref 37–54)
EGFRCR SERPLBLD CKD-EPI 2021: 28.5 ML/MIN/1.73
ERYTHROCYTE [DISTWIDTH] IN BLOOD BY AUTOMATED COUNT: 17 % (ref 12.3–15.4)
GLOBULIN UR ELPH-MCNC: 2.2 GM/DL
GLUCOSE SERPL-MCNC: 106 MG/DL (ref 65–99)
HCT VFR BLD AUTO: 21.3 % (ref 34–46.6)
HGB BLD-MCNC: 7.1 G/DL (ref 12–15.9)
HOLD SPECIMEN: NORMAL
HOLD SPECIMEN: NORMAL
INR PPP: 0.92 (ref 0.9–1.1)
LYMPHOCYTES # BLD MANUAL: 1.49 10*3/MM3 (ref 0.7–3.1)
LYMPHOCYTES NFR BLD MANUAL: 2 % (ref 5–12)
MCH RBC QN AUTO: 30.1 PG (ref 26.6–33)
MCHC RBC AUTO-ENTMCNC: 33.3 G/DL (ref 31.5–35.7)
MCV RBC AUTO: 90.3 FL (ref 79–97)
MONOCYTES # BLD: 0.03 10*3/MM3 (ref 0.1–0.9)
NEUTROPHILS # BLD AUTO: 0.03 10*3/MM3 (ref 1.7–7)
NEUTROPHILS NFR BLD MANUAL: 2 % (ref 42.7–76)
OVALOCYTES BLD QL SMEAR: ABNORMAL
PLAT MORPH BLD: NORMAL
PLATELET # BLD AUTO: 10 10*3/MM3 (ref 140–450)
POIKILOCYTOSIS BLD QL SMEAR: ABNORMAL
POTASSIUM SERPL-SCNC: 3.5 MMOL/L (ref 3.5–5.2)
PROCALCITONIN SERPL-MCNC: 0.12 NG/ML (ref 0–0.25)
PROT SERPL-MCNC: 6.2 G/DL (ref 6–8.5)
PROTHROMBIN TIME: 12.4 SECONDS (ref 11.7–14.2)
RBC # BLD AUTO: 2.36 10*6/MM3 (ref 3.77–5.28)
RH BLD: POSITIVE
SODIUM SERPL-SCNC: 137 MMOL/L (ref 136–145)
T&S EXPIRATION DATE: NORMAL
VARIANT LYMPHS NFR BLD MANUAL: 96 % (ref 19.6–45.3)
WBC MORPH BLD: NORMAL
WBC NRBC COR # BLD: 1.55 10*3/MM3 (ref 3.4–10.8)
WHOLE BLOOD HOLD COAG: NORMAL
WHOLE BLOOD HOLD SPECIMEN: NORMAL

## 2023-07-17 PROCEDURE — 63710000001 DIPHENHYDRAMINE PER 50 MG: Performed by: HOSPITALIST

## 2023-07-17 PROCEDURE — 86901 BLOOD TYPING SEROLOGIC RH(D): CPT

## 2023-07-17 PROCEDURE — 87040 BLOOD CULTURE FOR BACTERIA: CPT | Performed by: HOSPITALIST

## 2023-07-17 PROCEDURE — 86850 RBC ANTIBODY SCREEN: CPT | Performed by: EMERGENCY MEDICINE

## 2023-07-17 PROCEDURE — 99285 EMERGENCY DEPT VISIT HI MDM: CPT

## 2023-07-17 PROCEDURE — 71045 X-RAY EXAM CHEST 1 VIEW: CPT

## 2023-07-17 PROCEDURE — 86900 BLOOD TYPING SEROLOGIC ABO: CPT

## 2023-07-17 PROCEDURE — 80053 COMPREHEN METABOLIC PANEL: CPT | Performed by: EMERGENCY MEDICINE

## 2023-07-17 PROCEDURE — 36430 TRANSFUSION BLD/BLD COMPNT: CPT

## 2023-07-17 PROCEDURE — 70450 CT HEAD/BRAIN W/O DYE: CPT

## 2023-07-17 PROCEDURE — 85007 BL SMEAR W/DIFF WBC COUNT: CPT | Performed by: EMERGENCY MEDICINE

## 2023-07-17 PROCEDURE — 85730 THROMBOPLASTIN TIME PARTIAL: CPT | Performed by: EMERGENCY MEDICINE

## 2023-07-17 PROCEDURE — 86900 BLOOD TYPING SEROLOGIC ABO: CPT | Performed by: EMERGENCY MEDICINE

## 2023-07-17 PROCEDURE — 82607 VITAMIN B-12: CPT | Performed by: INTERNAL MEDICINE

## 2023-07-17 PROCEDURE — P9016 RBC LEUKOCYTES REDUCED: HCPCS

## 2023-07-17 PROCEDURE — 93005 ELECTROCARDIOGRAM TRACING: CPT | Performed by: EMERGENCY MEDICINE

## 2023-07-17 PROCEDURE — 93010 ELECTROCARDIOGRAM REPORT: CPT | Performed by: INTERNAL MEDICINE

## 2023-07-17 PROCEDURE — 82746 ASSAY OF FOLIC ACID SERUM: CPT | Performed by: INTERNAL MEDICINE

## 2023-07-17 PROCEDURE — 83605 ASSAY OF LACTIC ACID: CPT | Performed by: HOSPITALIST

## 2023-07-17 PROCEDURE — 81001 URINALYSIS AUTO W/SCOPE: CPT | Performed by: HOSPITALIST

## 2023-07-17 PROCEDURE — 86923 COMPATIBILITY TEST ELECTRIC: CPT

## 2023-07-17 PROCEDURE — 85610 PROTHROMBIN TIME: CPT | Performed by: EMERGENCY MEDICINE

## 2023-07-17 PROCEDURE — 85025 COMPLETE CBC W/AUTO DIFF WBC: CPT | Performed by: EMERGENCY MEDICINE

## 2023-07-17 PROCEDURE — 86901 BLOOD TYPING SEROLOGIC RH(D): CPT | Performed by: EMERGENCY MEDICINE

## 2023-07-17 PROCEDURE — 84145 PROCALCITONIN (PCT): CPT | Performed by: HOSPITALIST

## 2023-07-17 RX ORDER — FAMOTIDINE 20 MG/1
20 TABLET, FILM COATED ORAL 2 TIMES DAILY
Status: DISCONTINUED | OUTPATIENT
Start: 2023-07-17 | End: 2023-07-18 | Stop reason: HOSPADM

## 2023-07-17 RX ORDER — DIPHENHYDRAMINE HCL 50 MG
50 CAPSULE ORAL ONCE
Status: COMPLETED | OUTPATIENT
Start: 2023-07-17 | End: 2023-07-17

## 2023-07-17 RX ORDER — SODIUM CHLORIDE AND POTASSIUM CHLORIDE 150; 900 MG/100ML; MG/100ML
75 INJECTION, SOLUTION INTRAVENOUS CONTINUOUS
Status: DISCONTINUED | OUTPATIENT
Start: 2023-07-17 | End: 2023-07-18 | Stop reason: HOSPADM

## 2023-07-17 RX ADMIN — DIPHENHYDRAMINE HCL 50 MG: 50 CAPSULE ORAL at 22:45

## 2023-07-17 NOTE — ED TRIAGE NOTES
Pt presents to ED with complaints of generalized weakness since Saturday night. Pt endorses some nausea on Saturday but denies SOA or CP. Pt had labs today at [facility] and PCP reports low hemoglobin and platelets. Denies any known bleeding.

## 2023-07-17 NOTE — ED PROVIDER NOTES
EMERGENCY DEPARTMENT ENCOUNTER    Room Number:  18/18  Date seen:  7/17/2023  PCP: Annetta Austin APRN  Historian: Patient  Relevant information provided by sources other than the patient, review of external records, and social determinants of health may be included in the HPI section.      HPI:  Chief Complaint: Weakness  Additional historical features obtained directly from: No one  Context: Patsy Tobin is a 67 y.o. female who presents to the ED c/o generalized weakness, fatigue and dyspnea with exertion for the last 2 to 3 days.  Patient was seen by her PCP earlier today and labs were drawn that showed pancytopenia she was sent to the emergency department.  No fevers, no chest pain, no headache, no active bleeding and no other associated symptoms.  Patient has never had a history of this before.        Initial impression including social determinants which will impact the assessment very vague complaints but grossly abnormal CBC which I reviewed from earlier today in the outpatient setting.  It showed white blood cell count of 1, hemoglobin of 8 and platelet count of 2.  For acting on any of this I will repeat just to confirm          PAST MEDICAL HISTORY  Active Ambulatory Problems     Diagnosis Date Noted    Thrombophlebitis leg 04/24/2017    Rectal bleeding 02/26/2021    Anemia 02/26/2021    Gastroesophageal reflux disease 02/26/2021    Chronic pulmonary embolism 12/07/2021    Depressive disorder 05/06/2021    Gastric reflux 03/03/2022    Hyperlipidemia 01/11/2017    Hypertension 03/03/2022    Mitral valve prolapse 03/03/2022    Phlebitis 03/03/2022    Pleurisy with effusion, with mention of bacterial cause other than tuberculosis 11/29/2021    Pneumonia 11/29/2021    Rheumatoid arthritis 11/06/2015    Preoperative examination, unspecified 12/08/2020    Right bundle-branch block 12/11/2017    Serum creatinine raised 06/21/2021    Overweight     Esophageal dysphagia 11/11/2022    Abnormal  esophagram 11/11/2022    History of pulmonary embolism 05/07/2023    History of DVT (deep vein thrombosis) 05/07/2023    Gout 05/07/2023     Resolved Ambulatory Problems     Diagnosis Date Noted    Diarrhea 02/26/2021    NKAIA (acute kidney injury) 05/07/2023    Syncope 05/07/2023    Eyebrow laceration 05/07/2023    Hematochezia 05/07/2023     Past Medical History:   Diagnosis Date    Abdominal pain of multiple sites     Allergic rhinitis     AMEYA positive     Blurred vision, bilateral     Deep vein thrombosis     Depression     Esophagitis 2016    Fatigue     Gastric ulcer     GERD (gastroesophageal reflux disease)     Headache     High blood pressure     Leg cramps     Leg wound, right     Multiple joint pain     Peptic ulceration     PONV (postoperative nausea and vomiting)     Pulmonary embolism 11/25/2021    RA (rheumatoid arthritis)     Superficial phlebitis     Tattoos          PAST SURGICAL HISTORY  Past Surgical History:   Procedure Laterality Date    ABDOMINOPLASTY N/A 12/02/2014    Due to appendectomy site having staph infection-Dr. Keagan Yip    APPENDECTOMY N/A 1958    Dr. Henry    COLONOSCOPY  2018    [facility]    COLONOSCOPY N/A 03/12/2021    Procedure: COLONOSCOPY;  Surgeon: Rajesh Loco MD;  Location: [facility] MAIN OR;  Service: Gastroenterology;  Laterality: N/A;    ELBOW ARTHROPLASTY      ENDOSCOPY N/A 2016    Dr. Rajesh Loco     ENDOSCOPY N/A 03/12/2021    Procedure: ESOPHAGOGASTRODUODENOSCOPY;  Surgeon: Rajesh Loco MD;  Location: [facility] MAIN OR;  Service: Gastroenterology;  Laterality: N/A;    ENDOSCOPY N/A 11/21/2022    Procedure: ESOPHAGOGASTRODUODENOSCOPY WITH BIOPSY AND DILATION;  Surgeon: Rajesh Loco MD;  Location: [facility] MAIN OR;  Service: Gastroenterology;  Laterality: N/A;  mild gastritis, gastric polyp  15 - 18 mm balloon used.    FLEXIBLE SIGMOIDOSCOPY N/A 2016    Dr. Rajesh Loco    HEMORRHOIDECTOMY N/A 2009    Dr. Saba    HIP BIPOLAR REPLACEMENT      KNEE ARTHROSCOPY  W/ MENISCECTOMY Left     Dr. Rice    KNEE MENISCECTOMY Right     Dr. Rice    SUBTOTAL HYSTERECTOMY Bilateral     Dr. Sanford. Not due to cancer. Robotic-assisted     TUBAL ABDOMINAL LIGATION Bilateral     UPPER GASTROINTESTINAL ENDOSCOPY N/A 10/20/2016    UGI with biopsy. Normal esophagus: injected with botulinum toxin, Erythematous mucoas in the antrum: biopsied, normal duodenum-Dr. Rajesh Loco         FAMILY HISTORY  Family History   Problem Relation Age of Onset    Heart failure Mother     Hypertension Mother     Cancer Father         kidney    Colon polyps Brother         Brother    Cancer Brother         Lung and Liver Cancer    Diabetes Maternal Grandmother     Colon cancer Neg Hx     Crohn's disease Neg Hx     Irritable bowel syndrome Neg Hx     Ulcerative colitis Neg Hx          SOCIAL HISTORY  Social History     Socioeconomic History    Marital status:      Spouse name: Jose    Years of education: High school   Tobacco Use    Smoking status: Former     Packs/day: 1.00     Years: 22.00     Pack years: 22.00     Types: Cigarettes     Quit date: 1988     Years since quittin.5    Smokeless tobacco: Never   Vaping Use    Vaping Use: Never used   Substance and Sexual Activity    Alcohol use: Yes     Comment: Social drinker, do not drink everyday    Drug use: No    Sexual activity: Yes     Partners: Male     Birth control/protection: Surgical     Comment: Hysterectomy in          ALLERGIES  Celebrex [celecoxib], Arava [leflunomide], Duloxetine, Naproxen, Nsaids, and Plaquenil [hydroxychloroquine sulfate]          PHYSICAL EXAM  ED Triage Vitals   Temp Heart Rate Resp BP SpO2   23 1745 23 1745 23 1745 23 1825 23 1745   99.1 °F (37.3 °C) (!) 129 18 156/85 99 %      Temp src Heart Rate Source Patient Position BP Location FiO2 (%)   23 1745 23 1745 -- -- --   Oral Monitor            Physical Exam      GENERAL: Awake and alert, no acute  distress  HENT: nares patent, looks a little pale  EYES: no scleral icterus, PERRL, EOMI  CV: regular rhythm, normal rate, distal pulses symmetric and palpable  RESPIRATORY: normal effort, CTA bilaterally but tachypneic with exertion  ABDOMEN: soft, nondistended nontender with normal bowel sound  MUSCULOSKELETAL: no deformity  NEURO: alert, moves all extremities, follows commands  PSYCH:  calm, cooperative  SKIN: warm, dry with no rash        LAB RESULTS  Recent Results (from the past 24 hour(s))   Hemoglobin A1c    Collection Time: 07/17/23 12:42 PM    Specimen: Blood   Result Value Ref Range    Hemoglobin A1C 5.70 (H) 4.80 - 5.60 %   Comprehensive Metabolic Panel    Collection Time: 07/17/23 12:42 PM    Specimen: Blood   Result Value Ref Range    Glucose 118 (H) 65 - 99 mg/dL    BUN 51 (H) 8 - 23 mg/dL    Creatinine 1.99 (H) 0.57 - 1.00 mg/dL    Sodium 140 136 - 145 mmol/L    Potassium 3.8 3.5 - 5.2 mmol/L    Chloride 104 98 - 107 mmol/L    CO2 22.0 22.0 - 29.0 mmol/L    Calcium 9.3 8.6 - 10.5 mg/dL    Total Protein 6.9 6.0 - 8.5 g/dL    Albumin 4.3 3.5 - 5.2 g/dL    ALT (SGPT) 29 1 - 33 U/L    AST (SGOT) 29 1 - 32 U/L    Alkaline Phosphatase 106 39 - 117 U/L    Total Bilirubin 0.4 0.0 - 1.2 mg/dL    Globulin 2.6 gm/dL    A/G Ratio 1.7 g/dL    BUN/Creatinine Ratio 25.6 (H) 7.0 - 25.0    Anion Gap 14.0 5.0 - 15.0 mmol/L    eGFR 26.9 (L) >60.0 mL/min/1.73   CBC Auto Differential    Collection Time: 07/17/23 12:42 PM    Specimen: Blood   Result Value Ref Range    WBC 1.43 (C) 3.40 - 10.80 10*3/mm3    RBC 2.61 (L) 3.77 - 5.28 10*6/mm3    Hemoglobin 8.0 (L) 12.0 - 15.9 g/dL    Hematocrit 23.1 (L) 34.0 - 46.6 %    MCV 88.5 79.0 - 97.0 fL    MCH 30.7 26.6 - 33.0 pg    MCHC 34.6 31.5 - 35.7 g/dL    RDW 15.7 (H) 12.3 - 15.4 %    RDW-SD 50.7 37.0 - 54.0 fl    Platelets 2 (C) 140 - 450 10*3/mm3    Neutrophil % 1.4 (L) 42.7 - 76.0 %    Lymphocyte % 97.9 (H) 19.6 - 45.3 %    Monocyte % 0.7 (L) 5.0 - 12.0 %    Eosinophil % 0.0  (L) 0.3 - 6.2 %    Basophil % 0.0 0.0 - 1.5 %    Immature Grans % 0.0 0.0 - 0.5 %    Neutrophils, Absolute 0.02 (L) 1.70 - 7.00 10*3/mm3    Lymphocytes, Absolute 1.40 0.70 - 3.10 10*3/mm3    Monocytes, Absolute 0.01 (L) 0.10 - 0.90 10*3/mm3    Eosinophils, Absolute 0.00 0.00 - 0.40 10*3/mm3    Basophils, Absolute 0.00 0.00 - 0.20 10*3/mm3    Immature Grans, Absolute 0.00 0.00 - 0.05 10*3/mm3    nRBC 0.0 0.0 - 0.2 /100 WBC   Scan Slide    Collection Time: 07/17/23 12:42 PM    Specimen: Blood   Result Value Ref Range    RBC Morphology Normal Normal    WBC Morphology Normal Normal    Platelet Estimate Decreased Normal   ECG 12 Lead Altered Mental Status    Collection Time: 07/17/23  6:18 PM   Result Value Ref Range    QT Interval 406 ms   Type & Screen    Collection Time: 07/17/23  6:20 PM    Specimen: Blood   Result Value Ref Range    ABO Type B     RH type Positive     Antibody Screen Negative     T&S Expiration Date 7/20/2023 11:59:59 PM    CBC Auto Differential    Collection Time: 07/17/23  6:20 PM    Specimen: Blood   Result Value Ref Range    WBC 1.55 (C) 3.40 - 10.80 10*3/mm3    RBC 2.36 (L) 3.77 - 5.28 10*6/mm3    Hemoglobin 7.1 (L) 12.0 - 15.9 g/dL    Hematocrit 21.3 (L) 34.0 - 46.6 %    MCV 90.3 79.0 - 97.0 fL    MCH 30.1 26.6 - 33.0 pg    MCHC 33.3 31.5 - 35.7 g/dL    RDW 17.0 (H) 12.3 - 15.4 %    RDW-SD 54.5 (H) 37.0 - 54.0 fl    Platelets 10 (C) 140 - 450 10*3/mm3   Lavender Top    Collection Time: 07/17/23  6:20 PM   Result Value Ref Range    Extra Tube hold for add-on    Gold Top - SST    Collection Time: 07/17/23  6:20 PM   Result Value Ref Range    Extra Tube Hold for add-ons.    Light Blue Top    Collection Time: 07/17/23  6:20 PM   Result Value Ref Range    Extra Tube Hold for add-ons.    Manual Differential    Collection Time: 07/17/23  6:20 PM    Specimen: Blood   Result Value Ref Range    Neutrophil % 2.0 (L) 42.7 - 76.0 %    Lymphocyte % 96.0 (H) 19.6 - 45.3 %    Monocyte % 2.0 (L) 5.0 - 12.0 %  "   Neutrophils Absolute 0.03 (L) 1.70 - 7.00 10*3/mm3    Lymphocytes Absolute 1.49 0.70 - 3.10 10*3/mm3    Monocytes Absolute 0.03 (L) 0.10 - 0.90 10*3/mm3    Anisocytosis Mod/2+ None Seen    Ovalocytes Slight/1+ None Seen    Poikilocytes Slight/1+ None Seen    WBC Morphology Normal Normal    Platelet Morphology Normal Normal   Protime-INR    Collection Time: 07/17/23  6:20 PM    Specimen: Blood   Result Value Ref Range    Protime 12.4 11.7 - 14.2 Seconds    INR 0.92 0.90 - 1.10   aPTT    Collection Time: 07/17/23  6:20 PM    Specimen: Blood   Result Value Ref Range    PTT 23.5 22.7 - 35.4 seconds   Comprehensive Metabolic Panel    Collection Time: 07/17/23  6:21 PM    Specimen: Blood   Result Value Ref Range    Glucose 106 (H) 65 - 99 mg/dL    BUN 50 (H) 8 - 23 mg/dL    Creatinine 1.90 (H) 0.57 - 1.00 mg/dL    Sodium 137 136 - 145 mmol/L    Potassium 3.5 3.5 - 5.2 mmol/L    Chloride 102 98 - 107 mmol/L    CO2 22.0 22.0 - 29.0 mmol/L    Calcium 9.0 8.6 - 10.5 mg/dL    Total Protein 6.2 6.0 - 8.5 g/dL    Albumin 4.0 3.5 - 5.2 g/dL    ALT (SGPT) 26 1 - 33 U/L    AST (SGOT) 30 1 - 32 U/L    Alkaline Phosphatase 101 39 - 117 U/L    Total Bilirubin 0.4 0.0 - 1.2 mg/dL    Globulin 2.2 gm/dL    A/G Ratio 1.8 g/dL    BUN/Creatinine Ratio 26.3 (H) 7.0 - 25.0    Anion Gap 13.0 5.0 - 15.0 mmol/L    eGFR 28.5 (L) >60.0 mL/min/1.73   Green Top (Gel)    Collection Time: 07/17/23  6:21 PM   Result Value Ref Range    Extra Tube Hold for add-ons.    Lactic Acid, Plasma    Collection Time: 07/17/23  7:55 PM    Specimen: Blood   Result Value Ref Range    Lactate 1.8 0.5 - 2.0 mmol/L       Ordered the above labs and my independent interpretation of these results are discussed in the section labeled \"ED course\" below        RADIOLOGY  XR Chest 1 View    Result Date: 7/17/2023  XR CHEST 1 VW-  Clinical: Lethargy  COMPARISON 11/25/2021  FINDINGS: Heart size within normal limits, no mediastinal or hilar abnormality. Lungs clear.  " "CONCLUSION: No active disease of the chest  This report was finalized on 7/17/2023 8:25 PM by Dr. Ld Lyle M.D.       Ordered the above noted radiological studies.  Independently interpreted by me in PACS.  My independent interpretation of these results are discussed in the section below labeled \"ED course\"        PROCEDURES  Procedures          MEDICATIONS GIVEN IN ER  Medications - No data to display                MEDICAL DECISION MAKING and INDEPENDENT INTERPRETATIONS      Everything documented below this statement is the \"ED course\" section which I have referred to above.  Everything discussed in the following section constitutes MY INDEPENDENT INTERPRETATIONS of the lab work, EKGs, and radiology studies, and all of my personal conversations with other providers, and all of my independent decision making regarding this patient are discussed below.      ED Course as of 07/17/23 2034 Mon Jul 17, 2023   1805 18:05 EDT  ED first look.  Patient presents for generalized weakness.  Patient states she had an episode of nausea and vomiting on Saturday.  Had some blood vessels break around her mouth.  Patient states since then she is gotten progressively more weak.  States she talked to her primary doctor and had blood drawn and told her blood counts were low.  Labs ordered for oncoming physician [SL]   2032 Repeat labs confirm white blood cell count of 1.5, hemoglobin 7.1 and platelet count of 10 [DP]   2033 No active bleeding, will transfuse 1 unit of packed red cells. [DP]   2034 No headache or bruising but I will get a CT of the head [DP]   2034 Spoke with Dr. Cole who agrees to admit the patient to a telemetry bed for further.  Hematology consult will be obtained, no antibiotics at this time. [DP]      ED Course User Index  [DP] Yash Merida MD  [SL] Joselito Meraz MD         Everything documented above with this statement, in the ED course section constitutes my independent interpretation of lab " "work EKG and radiology studies along with my personal conversations with other providers and my independent medical decision making.  This statement will not be repeated before each data point, but they are all my independent interpretation needs contained within the \"ED course\" section.             All appropriate hygiene and PPE requirements were satisfied with this patient encounter      FINAL DIAGNOSES  Final diagnoses:   Pancytopenia   Symptomatic anemia           DISPOSITION  Admit          Latest Documented Vital Signs:  As of 20:34 EDT  BP- 150/77 HR- 79 Temp- 99.1 °F (37.3 °C) (Oral) O2 sat- 100%        --    Please note that portions of this were completed with a voice recognition program.       Note Disclaimer: At [facility], we believe that sharing information builds trust and better relationships. You are receiving this note because you are receiving care at [facility] or recently visited. It is possible you will see health information before a provider has talked with you about it. This kind of information can be easy to misunderstand. To help you fully understand what it      Yash Merida MD  07/17/23 2034    "

## 2023-07-17 NOTE — ED NOTES
Pt to ED with c/o dizziness and feeling weak since Saturday night, states gets dizzy when up and walking, had recent blood drawn and told she had low blood counts. Denies hx of transfusions or anemia, denies dark stools.

## 2023-07-18 VITALS
TEMPERATURE: 98.6 F | BODY MASS INDEX: 27.67 KG/M2 | HEIGHT: 67 IN | RESPIRATION RATE: 18 BRPM | SYSTOLIC BLOOD PRESSURE: 126 MMHG | OXYGEN SATURATION: 100 % | HEART RATE: 81 BPM | WEIGHT: 176.3 LBS | DIASTOLIC BLOOD PRESSURE: 62 MMHG

## 2023-07-18 LAB
ANION GAP SERPL CALCULATED.3IONS-SCNC: 11.7 MMOL/L (ref 5–15)
ANISOCYTOSIS BLD QL: ABNORMAL
BACTERIA UR QL AUTO: NORMAL /HPF
BASOPHILS # BLD AUTO: 0 10*3/MM3 (ref 0–0.2)
BASOPHILS NFR BLD AUTO: 0 % (ref 0–1.5)
BH BB BLOOD EXPIRATION DATE: NORMAL
BH BB BLOOD EXPIRATION DATE: NORMAL
BH BB BLOOD TYPE BARCODE: 7300
BH BB BLOOD TYPE BARCODE: 8400
BH BB DISPENSE STATUS: NORMAL
BH BB DISPENSE STATUS: NORMAL
BH BB PRODUCT CODE: NORMAL
BH BB PRODUCT CODE: NORMAL
BH BB UNIT NUMBER: NORMAL
BH BB UNIT NUMBER: NORMAL
BILIRUB UR QL STRIP: NEGATIVE
BUN SERPL-MCNC: 48 MG/DL (ref 8–23)
BUN/CREAT SERPL: 33.1 (ref 7–25)
CALCIUM SPEC-SCNC: 8.6 MG/DL (ref 8.6–10.5)
CHLORIDE SERPL-SCNC: 106 MMOL/L (ref 98–107)
CLARITY UR: CLEAR
CO2 SERPL-SCNC: 21.3 MMOL/L (ref 22–29)
COLOR UR: YELLOW
CREAT SERPL-MCNC: 1.45 MG/DL (ref 0.57–1)
CROSSMATCH INTERPRETATION: NORMAL
DEPRECATED RDW RBC AUTO: 53.2 FL (ref 37–54)
DEPRECATED RDW RBC AUTO: 53.7 FL (ref 37–54)
EGFRCR SERPLBLD CKD-EPI 2021: 39.4 ML/MIN/1.73
ELLIPTOCYTES BLD QL SMEAR: ABNORMAL
EOSINOPHIL # BLD AUTO: 0.01 10*3/MM3 (ref 0–0.4)
EOSINOPHIL NFR BLD AUTO: 0.6 % (ref 0.3–6.2)
ERYTHROCYTE [DISTWIDTH] IN BLOOD BY AUTOMATED COUNT: 16.6 % (ref 12.3–15.4)
ERYTHROCYTE [DISTWIDTH] IN BLOOD BY AUTOMATED COUNT: 16.7 % (ref 12.3–15.4)
FERRITIN SERPL-MCNC: 199 NG/ML (ref 13–150)
FOLATE SERPL-MCNC: 16.7 NG/ML (ref 4.78–24.2)
GLUCOSE SERPL-MCNC: 90 MG/DL (ref 65–99)
GLUCOSE UR STRIP-MCNC: NEGATIVE MG/DL
HCT VFR BLD AUTO: 21.2 % (ref 34–46.6)
HCT VFR BLD AUTO: 23.6 % (ref 34–46.6)
HGB BLD-MCNC: 7 G/DL (ref 12–15.9)
HGB BLD-MCNC: 7.7 G/DL (ref 12–15.9)
HGB RETIC QN AUTO: 35.7 PG (ref 29.8–36.1)
HGB UR QL STRIP.AUTO: ABNORMAL
HYALINE CASTS UR QL AUTO: NORMAL /LPF
IMM RETICS NFR: 4.2 % (ref 3–15.8)
IRON 24H UR-MRATE: 267 MCG/DL (ref 37–145)
IRON SATN MFR SERPL: 79 % (ref 20–50)
KETONES UR QL STRIP: NEGATIVE
LEUKOCYTE ESTERASE UR QL STRIP.AUTO: NEGATIVE
LYMPHOCYTES # BLD AUTO: 1.62 10*3/MM3 (ref 0.7–3.1)
LYMPHOCYTES # BLD MANUAL: 1.52 10*3/MM3 (ref 0.7–3.1)
LYMPHOCYTES NFR BLD AUTO: 98.2 % (ref 19.6–45.3)
MCH RBC QN AUTO: 28.9 PG (ref 26.6–33)
MCH RBC QN AUTO: 29.5 PG (ref 26.6–33)
MCHC RBC AUTO-ENTMCNC: 32.6 G/DL (ref 31.5–35.7)
MCHC RBC AUTO-ENTMCNC: 33 G/DL (ref 31.5–35.7)
MCV RBC AUTO: 88.7 FL (ref 79–97)
MCV RBC AUTO: 89.5 FL (ref 79–97)
MONOCYTES # BLD AUTO: 0.01 10*3/MM3 (ref 0.1–0.9)
MONOCYTES NFR BLD AUTO: 0.6 % (ref 5–12)
NEUTROPHILS # BLD AUTO: 0.03 10*3/MM3 (ref 1.7–7)
NEUTROPHILS NFR BLD AUTO: 0.01 10*3/MM3 (ref 1.7–7)
NEUTROPHILS NFR BLD AUTO: 0.6 % (ref 42.7–76)
NEUTROPHILS NFR BLD MANUAL: 2 % (ref 42.7–76)
NITRITE UR QL STRIP: NEGATIVE
NRBC BLD AUTO-RTO: 0 /100 WBC (ref 0–0.2)
NT-PROBNP SERPL-MCNC: 232 PG/ML (ref 0–900)
PH UR STRIP.AUTO: <=5 [PH] (ref 5–8)
PLAT MORPH BLD: NORMAL
PLATELET # BLD AUTO: 57 10*3/MM3 (ref 140–450)
PLATELET # BLD AUTO: 57 10*3/MM3 (ref 140–450)
PLATELET # BLD AUTO: <2 10*3/MM3 (ref 140–450)
PLATELET # BLD AUTO: <2 10*3/MM3 (ref 140–450)
PLATELETS.RETICULATED NFR BLD AUTO: 0.8 % (ref 0.9–6.5)
PLATELETS.RETICULATED NFR BLD AUTO: 2.3 % (ref 0.9–6.5)
PMV BLD AUTO: 9.2 FL (ref 6–12)
PMV BLD AUTO: ABNORMAL FL
POTASSIUM SERPL-SCNC: 3.3 MMOL/L (ref 3.5–5.2)
PROT UR QL STRIP: NEGATIVE
QT INTERVAL: 406 MS
RBC # BLD AUTO: 2.37 10*6/MM3 (ref 3.77–5.28)
RBC # BLD AUTO: 2.66 10*6/MM3 (ref 3.77–5.28)
RBC # UR STRIP: NORMAL /HPF
REF LAB TEST METHOD: NORMAL
RETICS # AUTO: 0.01 10*6/MM3 (ref 0.02–0.13)
RETICS # AUTO: <0.01 10*6/MM3 (ref 0.02–0.13)
RETICS/RBC NFR AUTO: 0.34 % (ref 0.7–1.9)
RETICS/RBC NFR AUTO: 0.48 % (ref 0.7–1.9)
SODIUM SERPL-SCNC: 139 MMOL/L (ref 136–145)
SP GR UR STRIP: 1.01 (ref 1–1.03)
SQUAMOUS #/AREA URNS HPF: NORMAL /HPF
TIBC SERPL-MCNC: 338 MCG/DL (ref 298–536)
TRANSFERRIN SERPL-MCNC: 227 MG/DL (ref 200–360)
UNIT  ABO: NORMAL
UNIT  ABO: NORMAL
UNIT  RH: NORMAL
UNIT  RH: NORMAL
UROBILINOGEN UR QL STRIP: ABNORMAL
VARIANT LYMPHS NFR BLD MANUAL: 4 % (ref 0–5)
VARIANT LYMPHS NFR BLD MANUAL: 94 % (ref 19.6–45.3)
VIT B12 BLD-MCNC: 189 PG/ML (ref 211–946)
WBC # UR STRIP: NORMAL /HPF
WBC MORPH BLD: NORMAL
WBC NRBC COR # BLD: 1.55 10*3/MM3 (ref 3.4–10.8)
WBC NRBC COR # BLD: 1.65 10*3/MM3 (ref 3.4–10.8)

## 2023-07-18 PROCEDURE — 36430 TRANSFUSION BLD/BLD COMPNT: CPT

## 2023-07-18 PROCEDURE — 85045 AUTOMATED RETICULOCYTE COUNT: CPT | Performed by: HOSPITALIST

## 2023-07-18 PROCEDURE — 84466 ASSAY OF TRANSFERRIN: CPT | Performed by: INTERNAL MEDICINE

## 2023-07-18 PROCEDURE — 85055 RETICULATED PLATELET ASSAY: CPT | Performed by: INTERNAL MEDICINE

## 2023-07-18 PROCEDURE — 80048 BASIC METABOLIC PNL TOTAL CA: CPT | Performed by: HOSPITALIST

## 2023-07-18 PROCEDURE — 85046 RETICYTE/HGB CONCENTRATE: CPT | Performed by: INTERNAL MEDICINE

## 2023-07-18 PROCEDURE — 85055 RETICULATED PLATELET ASSAY: CPT | Performed by: EMERGENCY MEDICINE

## 2023-07-18 PROCEDURE — 83540 ASSAY OF IRON: CPT | Performed by: INTERNAL MEDICINE

## 2023-07-18 PROCEDURE — 85025 COMPLETE CBC W/AUTO DIFF WBC: CPT | Performed by: HOSPITALIST

## 2023-07-18 PROCEDURE — 85025 COMPLETE CBC W/AUTO DIFF WBC: CPT | Performed by: INTERNAL MEDICINE

## 2023-07-18 PROCEDURE — 82728 ASSAY OF FERRITIN: CPT | Performed by: INTERNAL MEDICINE

## 2023-07-18 PROCEDURE — 99223 1ST HOSP IP/OBS HIGH 75: CPT | Performed by: INTERNAL MEDICINE

## 2023-07-18 PROCEDURE — 85007 BL SMEAR W/DIFF WBC COUNT: CPT | Performed by: INTERNAL MEDICINE

## 2023-07-18 PROCEDURE — P9100 PATHOGEN TEST FOR PLATELETS: HCPCS

## 2023-07-18 PROCEDURE — 25010000002 SODIUM CHLORIDE 0.9 % WITH KCL 20 MEQ 20-0.9 MEQ/L-% SOLUTION: Performed by: HOSPITALIST

## 2023-07-18 PROCEDURE — P9035 PLATELET PHERES LEUKOREDUCED: HCPCS

## 2023-07-18 PROCEDURE — 83880 ASSAY OF NATRIURETIC PEPTIDE: CPT | Performed by: HOSPITALIST

## 2023-07-18 PROCEDURE — G0316 PR PROLONG INPT EVAL ADD15 M: HCPCS | Performed by: INTERNAL MEDICINE

## 2023-07-18 RX ORDER — ALLOPURINOL 100 MG/1
100 TABLET ORAL DAILY
Status: DISCONTINUED | OUTPATIENT
Start: 2023-07-18 | End: 2023-07-18 | Stop reason: HOSPADM

## 2023-07-18 RX ORDER — ALLOPURINOL 100 MG/1
200 TABLET ORAL DAILY
Status: DISCONTINUED | OUTPATIENT
Start: 2023-07-18 | End: 2023-07-18

## 2023-07-18 RX ORDER — FEBUXOSTAT 40 MG/1
40 TABLET, FILM COATED ORAL DAILY
Status: DISCONTINUED | OUTPATIENT
Start: 2023-07-18 | End: 2023-07-18

## 2023-07-18 RX ORDER — POTASSIUM CHLORIDE 750 MG/1
40 TABLET, FILM COATED, EXTENDED RELEASE ORAL ONCE
Status: COMPLETED | OUTPATIENT
Start: 2023-07-18 | End: 2023-07-18

## 2023-07-18 RX ORDER — LISINOPRIL 10 MG/1
10 TABLET ORAL DAILY
Status: DISCONTINUED | OUTPATIENT
Start: 2023-07-18 | End: 2023-07-18

## 2023-07-18 RX ORDER — METOPROLOL SUCCINATE 25 MG/1
25 TABLET, EXTENDED RELEASE ORAL DAILY
Status: DISCONTINUED | OUTPATIENT
Start: 2023-07-18 | End: 2023-07-18 | Stop reason: HOSPADM

## 2023-07-18 RX ORDER — ALLOPURINOL 300 MG/1
300 TABLET ORAL DAILY
Status: DISCONTINUED | OUTPATIENT
Start: 2023-07-18 | End: 2023-07-18

## 2023-07-18 RX ORDER — DIPHENHYDRAMINE HCL 50 MG
50 CAPSULE ORAL ONCE
Status: DISCONTINUED | OUTPATIENT
Start: 2023-07-18 | End: 2023-07-18

## 2023-07-18 RX ORDER — L.ACID,PARA/B.BIFIDUM/S.THERM 8B CELL
1 CAPSULE ORAL DAILY
Status: DISCONTINUED | OUTPATIENT
Start: 2023-07-18 | End: 2023-07-18 | Stop reason: HOSPADM

## 2023-07-18 RX ORDER — METOPROLOL SUCCINATE 25 MG/1
25 TABLET, EXTENDED RELEASE ORAL DAILY
Qty: 30 TABLET | Refills: 0 | Status: SHIPPED | OUTPATIENT
Start: 2023-07-19 | End: 2023-08-18

## 2023-07-18 RX ORDER — CETIRIZINE HYDROCHLORIDE 10 MG/1
10 TABLET ORAL DAILY
Status: DISCONTINUED | OUTPATIENT
Start: 2023-07-18 | End: 2023-07-18 | Stop reason: HOSPADM

## 2023-07-18 RX ORDER — METOPROLOL SUCCINATE 25 MG/1
37.5 TABLET, EXTENDED RELEASE ORAL DAILY
Status: DISCONTINUED | OUTPATIENT
Start: 2023-07-18 | End: 2023-07-18

## 2023-07-18 RX ORDER — FLUTICASONE PROPIONATE 50 MCG
2 SPRAY, SUSPENSION (ML) NASAL DAILY
Status: DISCONTINUED | OUTPATIENT
Start: 2023-07-18 | End: 2023-07-18 | Stop reason: HOSPADM

## 2023-07-18 RX ORDER — POTASSIUM CHLORIDE 750 MG/1
10 TABLET, FILM COATED, EXTENDED RELEASE ORAL DAILY
Status: DISCONTINUED | OUTPATIENT
Start: 2023-07-18 | End: 2023-07-18

## 2023-07-18 RX ORDER — ATORVASTATIN CALCIUM 20 MG/1
10 TABLET, FILM COATED ORAL DAILY
Status: DISCONTINUED | OUTPATIENT
Start: 2023-07-18 | End: 2023-07-18 | Stop reason: HOSPADM

## 2023-07-18 RX ADMIN — POTASSIUM CHLORIDE 40 MEQ: 750 TABLET, EXTENDED RELEASE ORAL at 14:39

## 2023-07-18 RX ADMIN — CETIRIZINE HYDROCHLORIDE 10 MG: 10 TABLET ORAL at 14:40

## 2023-07-18 RX ADMIN — METOPROLOL SUCCINATE 25 MG: 25 TABLET, EXTENDED RELEASE ORAL at 14:40

## 2023-07-18 RX ADMIN — FAMOTIDINE 20 MG: 20 TABLET, FILM COATED ORAL at 02:06

## 2023-07-18 RX ADMIN — ALLOPURINOL 100 MG: 100 TABLET ORAL at 16:27

## 2023-07-18 RX ADMIN — FLUTICASONE PROPIONATE 2 SPRAY: 50 SPRAY, METERED NASAL at 14:39

## 2023-07-18 RX ADMIN — FAMOTIDINE 20 MG: 20 TABLET, FILM COATED ORAL at 09:08

## 2023-07-18 RX ADMIN — ATORVASTATIN CALCIUM 10 MG: 20 TABLET, FILM COATED ORAL at 14:39

## 2023-07-18 RX ADMIN — POTASSIUM CHLORIDE AND SODIUM CHLORIDE 75 ML/HR: 900; 150 INJECTION, SOLUTION INTRAVENOUS at 02:05

## 2023-07-18 RX ADMIN — Medication 1 CAPSULE: at 14:40

## 2023-07-18 NOTE — H&P
History and physical    Primary care physician      Chief complaint  Generalized weakness    History of present illness  67-year-old white female with history of hypertension hyperlipidemia osteoarthritis rheumatoid arthritis and gastroesophageal reflux disease presented to [facility] with generalized weakness for last several days who also have fatigue tiredness shortness of breath with exertion for few days.  Patient denies any fever chills chest pain palpitation abdominal pain nausea vomiting diarrhea.  Patient evaluated in ER found to have severe pancytopenia with symptomatic anemia admitted for management.  Patient has no history of pancytopenia.  She denies any black stools blood in the stools.       PAST MEDICAL HISTORY   Abdominal pain of multiple sites      Allergic rhinitis      AMEAY positive      Blurred vision, bilateral      Deep vein thrombosis      Depression      Esophagitis 2016    Fatigue      Gastric ulcer      GERD (gastroesophageal reflux disease)      Headache      High blood pressure      Leg cramps      Leg wound, right      Multiple joint pain      Peptic ulceration      PONV (postoperative nausea and vomiting)      Pulmonary embolism 11/25/2021    RA (rheumatoid arthritis)      Superficial phlebitis      Tattoos        PAST SURGICAL HISTORY              Procedure Laterality Date    ABDOMINOPLASTY N/A 12/02/2014     Due to appendectomy site having staph infection-Dr. Keagan Yip    APPENDECTOMY N/A 1958     Dr. Henry    COLONOSCOPY   2018     [facility]    COLONOSCOPY N/A 03/12/2021     Procedure: COLONOSCOPY;  Surgeon: Rajesh Loco MD;  Location: [facility]IN OR;  Service: Gastroenterology;  Laterality: N/A;    ELBOW ARTHROPLASTY        ENDOSCOPY N/A 2016     Dr. Rajesh Loco     ENDOSCOPY N/A 03/12/2021     Procedure: ESOPHAGOGASTRODUODENOSCOPY;  Surgeon: Rajesh Loco MD;  Location: [facility] MAIN OR;  Service: Gastroenterology;  Laterality: N/A;    ENDOSCOPY N/A  2022     Procedure: ESOPHAGOGASTRODUODENOSCOPY WITH BIOPSY AND DILATION;  Surgeon: Rajesh Loco MD;  [place] [facility] [place];  Service: Gastroenterology;  Laterality: N/A;  mild gastritis, gastric polyp  15 - 18 mm balloon used.    FLEXIBLE SIGMOIDOSCOPY N/A      Dr. Rajesh Loco    HEMORRHOIDECTOMY N/A      Dr. Saba    HIP BIPOLAR REPLACEMENT        KNEE ARTHROSCOPY W/ MENISCECTOMY Left      Dr. Rice    KNEE MENISCECTOMY Right      Dr. Rice    SUBTOTAL HYSTERECTOMY Bilateral      Dr. Sanford. Not due to cancer. Robotic-assisted     TUBAL ABDOMINAL LIGATION Bilateral      UPPER GASTROINTESTINAL ENDOSCOPY N/A 10/20/2016     UGI with biopsy. Normal esophagus: injected with botulinum toxin, Erythematous mucoas in the antrum: biopsied, normal duodenum-Dr. Rajesh Loco         FAMILY HISTORY           Problem Relation Age of Onset    Heart failure Mother      Hypertension Mother      Cancer Father           kidney    Colon polyps Brother           Brother    Cancer Brother           Lung and Liver Cancer    Diabetes Maternal Grandmother      Colon cancer Neg Hx      Crohn's disease Neg Hx      Irritable bowel syndrome Neg Hx      Ulcerative colitis Neg Hx        SOCIAL HISTORY                 Socioeconomic History    Marital status:        Spouse name: Jose    Years of education: High school   Tobacco Use    Smoking status: Former       Packs/day: 1.00       Years: 22.00       Pack years: 22.00       Types: Cigarettes       Quit date: 1988       Years since quittin.5    Smokeless tobacco: Never   Vaping Use    Vaping Use: Never used   Substance and Sexual Activity    Alcohol use: Yes       Comment: Social drinker, do not drink everyday    Drug use: No    Sexual activity: Yes       Partners: Male       Birth control/protection: Surgical       Comment: Hysterectomy in          ALLERGIES  Celebrex [celecoxib], Arava [leflunomide], Duloxetine, Naproxen, Nsaids, and  "Plaquenil [hydroxychloroquine sulfate]  Home medications reviewed     PHYSICAL EXAM  Blood pressure 125/61, pulse 80, temperature 98.1 °F (36.7 °C), temperature source Oral, resp. rate 18, height 170.2 cm (67\"), weight 80 kg (176 lb 4.8 oz), SpO2 100 %.    GENERAL: Awake and alert, no acute distress  HENT: nares patent, looks a little pale  EYES: no scleral icterus, PERRL, EOMI  CV: regular rhythm, normal rate, distal pulses symmetric and palpable  RESPIRATORY: normal effort, CTA bilaterally but tachypneic with exertion  ABDOMEN: soft, nondistended nontender with normal bowel sound  MUSCULOSKELETAL: no deformity  NEURO: alert, moves all extremities, follows commands  PSYCH:  calm, cooperative  SKIN: warm, dry with no rash     LAB RESULTS  Lab Results (last 24 hours)       Procedure Component Value Units Date/Time    Ferritin [[id number]]  (Abnormal) Collected: 07/18/23 0327    Specimen: Blood Updated: 07/18/23 1157     Ferritin 199.00 ng/mL     Narrative:      Results may be falsely decreased if patient taking Biotin.      Iron Profile [[id number]]  (Abnormal) Collected: 07/18/23 0327    Specimen: Blood Updated: 07/18/23 1151     Iron 267 mcg/dL      Iron Saturation (TSAT) 79 %      Transferrin 227 mg/dL      TIBC 338 mcg/dL     Vitamin B12 [[id number]] Collected: 07/17/23 1820    Specimen: Blood Updated: 07/18/23 1129    Folate [[id number]] Collected: 07/17/23 1820    Specimen: Blood Updated: 07/18/23 1129    Retic With IRF & RET-He [[id number]]  (Abnormal) Collected: 07/18/23 0825    Specimen: Blood Updated: 07/18/23 1127     Immature Reticulocyte Fraction 4.2 %      Reticulocyte % 0.48 %      Reticulocyte Absolute 0.0116 10*6/mm3      Reticulocyte Hgb 35.7 pg     Manual Differential [[id number]]  (Abnormal) Collected: 07/18/23 0825    Specimen: Blood Updated: 07/18/23 0950     Neutrophil % 2.0 %      Lymphocyte % 94.0 %      Atypical Lymphocyte % 4.0 %      Neutrophils Absolute 0.03 10*3/mm3      Lymphocytes Absolute " 1.52 10*3/mm3      Anisocytosis Mod/2+     Elliptocytes Mod/2+     WBC Morphology Normal     Platelet Morphology Normal    CBC & Differential [[id number]]  (Abnormal) Collected: 07/18/23 0825    Specimen: Blood Updated: 07/18/23 0950    Narrative:      The following orders were created for panel order CBC & Differential.  Procedure                               Abnormality         Status                     ---------                               -----------         ------                     CBC Auto Differential[[id number]]        Abnormal            Final result                 Please view results for these tests on the individual orders.    CBC Auto Differential [[id number]]  (Abnormal) Collected: 07/18/23 0825    Specimen: Blood Updated: 07/18/23 0950     WBC 1.55 10*3/mm3      RBC 2.37 10*6/mm3      Hemoglobin 7.0 g/dL      Hematocrit 21.2 %      MCV 89.5 fL      MCH 29.5 pg      MCHC 33.0 g/dL      RDW 16.7 %      RDW-SD 53.7 fl      MPV 9.2 fL      Platelets 57 10*3/mm3      nRBC 0.0 /100 WBC     Immature Platelet Fraction [[id number]]  (Abnormal) Collected: 07/18/23 0825    Specimen: Blood Updated: 07/18/23 0907     IPF 0.80 %      Platelets 57 10*3/mm3     Reticulocytes [[id number]]  (Abnormal) Collected: 07/18/23 0458    Specimen: Blood Updated: 07/18/23 0803     Reticulocyte % 0.34 %      Reticulocyte Absolute <0.0100 10*6/mm3     Immature Platelet Fraction [[id number]]  (Abnormal) Collected: 07/18/23 0327    Specimen: Blood Updated: 07/18/23 0441     IPF 2.30 %      Platelets <2 10*3/mm3     CBC & Differential [[id number]]  (Abnormal) Collected: 07/18/23 0327    Specimen: Blood Updated: 07/18/23 0441    Narrative:      The following orders were created for panel order CBC & Differential.  Procedure                               Abnormality         Status                     ---------                               -----------         ------                     CBC Auto Differential[[id number]]        Abnormal             Final result                 Please view results for these tests on the individual orders.    CBC Auto Differential [[id number]]  (Abnormal) Collected: 07/18/23 0327    Specimen: Blood Updated: 07/18/23 0441     WBC 1.65 10*3/mm3      RBC 2.66 10*6/mm3      Hemoglobin 7.7 g/dL      Hematocrit 23.6 %      MCV 88.7 fL      MCH 28.9 pg      MCHC 32.6 g/dL      RDW 16.6 %      RDW-SD 53.2 fl      MPV --     Comment: UNABLE TO CALCULATE        Platelets <2 10*3/mm3      Neutrophil % 0.6 %      Lymphocyte % 98.2 %      Monocyte % 0.6 %      Eosinophil % 0.6 %      Basophil % 0.0 %      Neutrophils, Absolute 0.01 10*3/mm3      Lymphocytes, Absolute 1.62 10*3/mm3      Monocytes, Absolute 0.01 10*3/mm3      Eosinophils, Absolute 0.01 10*3/mm3      Basophils, Absolute 0.00 10*3/mm3     Basic Metabolic Panel [[id number]]  (Abnormal) Collected: 07/18/23 0327    Specimen: Blood Updated: 07/18/23 0434     Glucose 90 mg/dL      BUN 48 mg/dL      Creatinine 1.45 mg/dL      Sodium 139 mmol/L      Potassium 3.3 mmol/L      Chloride 106 mmol/L      CO2 21.3 mmol/L      Calcium 8.6 mg/dL      BUN/Creatinine Ratio 33.1     Anion Gap 11.7 mmol/L      eGFR 39.4 mL/min/1.73     Narrative:      GFR Normal >60  Chronic Kidney Disease <60  Kidney Failure <15      Urinalysis With Microscopic If Indicated (No Culture) - Urine, Clean Catch [[id number]]  (Abnormal) Collected: 07/17/23 2355    Specimen: Urine, Clean Catch Updated: 07/18/23 0121     Color, UA Yellow     Appearance, UA Clear     pH, UA <=5.0     Specific Gravity, UA 1.015     Glucose, UA Negative     Ketones, UA Negative     Bilirubin, UA Negative     Blood, UA Small (1+)     Protein, UA Negative     Leuk Esterase, UA Negative     Nitrite, UA Negative     Urobilinogen, UA 0.2 E.U./dL    Urinalysis, Microscopic Only - Urine, Clean Catch [[id number]] Collected: 07/17/23 2355    Specimen: Urine, Clean Catch Updated: 07/18/23 0121     RBC, UA 0-2 /HPF      WBC, UA 0-2 /HPF       "Bacteria, UA None Seen /HPF      Squamous Epithelial Cells, UA 0-2 /HPF      Hyaline Casts, UA None Seen /LPF      Methodology Automated Microscopy    Procalcitonin [[id number]]  (Normal) Collected: 07/17/23 1821    Specimen: Blood Updated: 07/17/23 2213     Procalcitonin 0.12 ng/mL     Narrative:      As a Marker for Sepsis (Non-Neonates):    1. <0.5 ng/mL represents a low risk of severe sepsis and/or septic shock.  2. >2 ng/mL represents a high risk of severe sepsis and/or septic shock.    As a Marker for Lower Respiratory Tract Infections that require antibiotic therapy:    PCT on Admission    Antibiotic Therapy       6-12 Hrs later    >0.5                Strongly Recommended  >0.25 - <0.5        Recommended   0.1 - 0.25          Discouraged              Remeasure/reassess PCT  <0.1                Strongly Discouraged     Remeasure/reassess PCT    As 28 day mortality risk marker: \"Change in Procalcitonin Result\" (>80% or <=80%) if Day 0 (or Day 1) and Day 4 values are available. Refer to http://www.[facility]-pct-calculator.com    Change in PCT <=80%  A decrease of PCT levels below or equal to 80% defines a positive change in PCT test result representing a higher risk for 28-day all-cause mortality of patients diagnosed with severe sepsis for septic shock.    Change in PCT >80%  A decrease of PCT levels of more than 80% defines a negative change in PCT result representing a lower risk for 28-day all-cause mortality of patients diagnosed with severe sepsis or septic shock.       Blood Culture - Blood, Hand, Right [[id number]] Collected: 07/17/23 2025    Specimen: Blood from Hand, Right Updated: 07/17/23 2032    Lactic Acid, Plasma [[id number]]  (Normal) Collected: 07/17/23 1955    Specimen: Blood Updated: 07/17/23 2024     Lactate 1.8 mmol/L     Blood Culture - Blood, Arm, Right [[id number]] Collected: 07/17/23 1955    Specimen: Blood from Arm, Right Updated: 07/17/23 2002    aPTT [[id number]]  (Normal) Collected: 07/17/23 " 1820    Specimen: Blood Updated: 07/17/23 2000     PTT 23.5 seconds     Protime-INR [[id number]]  (Normal) Collected: 07/17/23 1820    Specimen: Blood Updated: 07/17/23 2000     Protime 12.4 Seconds      INR 0.92    [place] Draw [[id number]] Collected: 07/17/23 1820    Specimen: Blood Updated: 07/17/23 1931    Narrative:      The following orders were created for panel order [place] Draw.  Procedure                               Abnormality         Status                     ---------                               -----------         ------                     Green Top (Gel)[[id number]]                                  Final result               Lavender Top[[id number]]                                     Final result               Gold Top - SST[[id number]]                                   Final result               Light Blue Top[[id number]]                                   Final result                 Please view results for these tests on the individual orders.    Green Top (Gel) [[id number]] Collected: 07/17/23 1821    Specimen: Blood Updated: 07/17/23 1931     Extra Tube Hold for add-ons.     Comment: Auto resulted.       Gold Top - SST [[id number]] Collected: 07/17/23 1820    Specimen: Blood Updated: 07/17/23 1931     Extra Tube Hold for add-ons.     Comment: Auto resulted.       Light Blue Top [[id number]] Collected: 07/17/23 1820    Specimen: Blood Updated: 07/17/23 1931     Extra Tube Hold for add-ons.     Comment: Auto resulted       Lavender Top [[id number]] Collected: 07/17/23 1820    Specimen: Blood Updated: 07/17/23 1931     Extra Tube hold for add-on     Comment: Auto resulted       Manual Differential [[id number]]  (Abnormal) Collected: 07/17/23 1820    Specimen: Blood Updated: 07/17/23 1924     Neutrophil % 2.0 %      Lymphocyte % 96.0 %      Monocyte % 2.0 %      Neutrophils Absolute 0.03 10*3/mm3      Lymphocytes Absolute 1.49 10*3/mm3      Monocytes Absolute 0.03 10*3/mm3      Anisocytosis Mod/2+      Ovalocytes Slight/1+     Poikilocytes Slight/1+     WBC Morphology Normal     Platelet Morphology Normal    Comprehensive Metabolic Panel [[id number]]  (Abnormal) Collected: 07/17/23 1821    Specimen: Blood Updated: 07/17/23 1903     Glucose 106 mg/dL      BUN 50 mg/dL      Creatinine 1.90 mg/dL      Sodium 137 mmol/L      Potassium 3.5 mmol/L      Comment: Slight hemolysis detected by analyzer. Results may be affected.        Chloride 102 mmol/L      CO2 22.0 mmol/L      Calcium 9.0 mg/dL      Total Protein 6.2 g/dL      Albumin 4.0 g/dL      ALT (SGPT) 26 U/L      AST (SGOT) 30 U/L      Alkaline Phosphatase 101 U/L      Total Bilirubin 0.4 mg/dL      Globulin 2.2 gm/dL      A/G Ratio 1.8 g/dL      BUN/Creatinine Ratio 26.3     Anion Gap 13.0 mmol/L      eGFR 28.5 mL/min/1.73     Narrative:      GFR Normal >60  Chronic Kidney Disease <60  Kidney Failure <15      CBC & Differential [[id number]]  (Abnormal) Collected: 07/17/23 1820    Specimen: Blood Updated: 07/17/23 1855    Narrative:      The following orders were created for panel order CBC & Differential.  Procedure                               Abnormality         Status                     ---------                               -----------         ------                     CBC Auto Differential[[id number]]        Abnormal            Final result                 Please view results for these tests on the individual orders.    CBC Auto Differential [[id number]]  (Abnormal) Collected: 07/17/23 1820    Specimen: Blood Updated: 07/17/23 1855     WBC 1.55 10*3/mm3      RBC 2.36 10*6/mm3      Hemoglobin 7.1 g/dL      Hematocrit 21.3 %      MCV 90.3 fL      MCH 30.1 pg      MCHC 33.3 g/dL      RDW 17.0 %      RDW-SD 54.5 fl      Platelets 10 10*3/mm3           Imaging Results (Last 24 Hours)       Procedure Component Value Units Date/Time    CT Head Without Contrast [[id number]] Collected: 07/17/23 2220     Updated: 07/17/23 2229    Narrative:      CT HEAD WITHOUT  CONTRAST     CLINICAL HISTORY: Platelets less than 10,000.     TECHNIQUE: CT scan of the head was obtained with 3 mm axial soft tissue  algorithm images. No intravenous contrast was administered. Sagittal and  coronal reconstructions were obtained.     COMPARISON: No previous similar studies are available for comparison.     FINDINGS:       The ventricles, sulci, and cisterns are age-appropriate. The gray-white  matter differentiation is within normal limits. The basal ganglia and  thalami are unremarkable in appearance. The posterior fossa structures  are within normal limits.     There are 2 small indeterminate lucent lesions identified within the  anterior aspect of the frontal bone. The larger of the 2 measures up to  approximately 7 mm in diameter. Unfortunately, there are no old studies  available for comparison to ensure stability. These may simply represent  incidental benign entities such as hemangiomas. However, I cannot  entirely exclude the possibility of a metastatic or myelomatous lesion.  These areas could potentially be further characterized with an MRI of  the brain with and without the use of IV contrast.       Impression:         No evidence for acute intracranial pathology.     Incidental note is made of small indeterminate lucent lesions within the  anterior aspect of the frontal calvarium. The largest of these measures  up to 7 mm in diameter. While these could simply represent benign  entities such as hemangiomas, I cannot entirely exclude the possibility  of a metastatic or myelomatous lesion at these sites. These lesions  could potentially be further characterized with an MRI of the brain with  and without the use of IV contrast.                 Radiation dose reduction techniques were utilized, including automated  exposure control and exposure modulation based on body size.     This report was finalized on 7/17/2023 10:26 PM by Dr. Jose A Renteria M.D.       XR Chest 1 View [[id number]]  Collected: 07/17/23 2025     Updated: 07/17/23 2028    Narrative:      XR CHEST 1 VW-     Clinical: Lethargy     COMPARISON 11/25/2021     FINDINGS: Heart size within normal limits, no mediastinal or hilar  abnormality. Lungs clear.     CONCLUSION: No active disease of the chest     This report was finalized on 7/17/2023 8:25 PM by Dr. Ld Lyle M.D.             ECG 12 Lead Altered Mental Status: Patient Communication    [facility]17[facility]2023 [facility]18 [facility]         Author: -- Service: -- Author Type: --   Filed: Date of Service: Creation Time:   Status: (Other)   HEART RATE= 91  bpm  RR Interval= 659  ms  MT Interval= 144  ms  P Horizontal Axis= -25  deg  P Front Axis= 45  deg  QRSD Interval= 131  ms  QT Interval= 406  ms  QRS Axis= -7  deg  T Wave Axis= 19  deg  - ABNORMAL ECG -  Sinus rhythm  Right bundle branch block          Current Facility-Administered Medications:     allopurinol (ZYLOPRIM) tablet 300 mg, 300 mg, Oral, Daily, Edwardo Cole MD    atorvastatin (LIPITOR) tablet 10 mg, 10 mg, Oral, Daily, Edwardo Cole MD    cetirizine (zyrTEC) tablet 10 mg, 10 mg, Oral, Daily, Edwardo Cole MD    famotidine (PEPCID) tablet 20 mg, 20 mg, Oral, BID, Edwardo Cole MD, 20 mg at 07/18/23 0908    fluticasone (FLONASE) 50 MCG/ACT nasal spray 2 spray, 2 spray, Nasal, Daily, Edwardo Cole MD    lactobacillus acidophilus (RISAQUAD) capsule 1 capsule, 1 capsule, Oral, Daily, Edwardo Cole MD    metoprolol succinate XL (TOPROL-XL) 24 hr tablet 25 mg, 25 mg, Oral, Daily, Edwardo Cole MD    sodium chloride 0.9 % with KCl 20 mEq/L infusion, 75 mL/hr, Intravenous, Continuous, Edwardo Cole MD, Last Rate: 75 mL/hr at 07/18/23 0205, 75 mL/hr at 07/18/23 0205     ASSESSMENT  Severe pancytopenia  Symptomatic anemia  Thrombocytopenia  Acute kidney injury  Hypertension  Hyperlipidemia  Osteoarthritis/gout  Rheumatoid arthritis  Chronic kidney disease stage III  Gastroesophageal reflux  disease    PLAN  Admit  IVF  Transfuse packed RBC and platelet as needed  Hematology consult  Adjust home medications  Stress ulcer DVT prophylaxis  Supportive care  Patient is full code  Discussed with family nursing staff hematology and discharge planner  Patient to be transferred to [facility] for further care once bed available    MATIAS HOLLOWAY MD

## 2023-07-18 NOTE — DISCHARGE SUMMARY
Discharge summary    Date of admission 7/17/2023  Date of discharge 7/18/2023    Final diagnosis  Severe new onset pancytopenia  Symptomatic anemia  Severe thrombocytopenia required transfusion   Hypertension  Hyperlipidemia  Osteoarthritis  Rheumatoid arthritis  Acute kidney injury  Chronic kidney disease stage III  Gastroesophageal reflux disease    Discharge medications    Current Facility-Administered Medications:     allopurinol (ZYLOPRIM) tablet 100 mg, 100 mg, Oral, Daily, Edwardo Cole MD    atorvastatin (LIPITOR) tablet 10 mg, 10 mg, Oral, Daily, Edwardo Cole MD, 10 mg at 07/18/23 1439    cetirizine (zyrTEC) tablet 10 mg, 10 mg, Oral, Daily, Edwardo Cole MD, 10 mg at 07/18/23 1440    famotidine (PEPCID) tablet 20 mg, 20 mg, Oral, BID, Edwardo Cole MD, 20 mg at 07/18/23 0908    fluticasone (FLONASE) 50 MCG/ACT nasal spray 2 spray, 2 spray, Nasal, Daily, Edwardo Cole MD, 2 spray at 07/18/23 1439    lactobacillus acidophilus (RISAQUAD) capsule 1 capsule, 1 capsule, Oral, Daily, Edwardo Cole MD, 1 capsule at 07/18/23 1440    metoprolol succinate XL (TOPROL-XL) 24 hr tablet 25 mg, 25 mg, Oral, Daily, Edwardo Cole MD, 25 mg at 07/18/23 1440    sodium chloride 0.9 % with KCl 20 mEq/L infusion, 75 mL/hr, Intravenous, Continuous, Edwardo Cole MD, Last Rate: 75 mL/hr at 07/18/23 0205, 75 mL/hr at 07/18/23 0205     Consults obtained  Hematology oncology    Procedures  None    Hospital course  67-year-old white female with history of hypertension hyperlipidemia osteoarthritis rheumatoid arthritis admitted to emergency room with generalized weakness for last several days to week.  Patient also have severe fatigue tired all the time and short of breath with exertion for last few days.  Patient work-up in ER revealed severe pancytopenia admitted for management.  Patient admitted received packed RBC transfusion and platelet transfusion and further evaluated by hematology and recommend she is hemodynamically stable  and transferred to [facility] for further management including bone marrow biopsy.  Patient has a accepting physician and clear for discharge.  Patient hemoglobin improved to 7.0 and platelet improved to 57 after transfusion.  Discussed with hematology and they recommend no further transfusion and cleared for discharge.    Discharge diet regular    Activity as tolerated    Medication as above    Further care per accepting physician at [facility] and take medication as directed.    MATIAS HOLLOWAY MD

## 2023-07-18 NOTE — PLAN OF CARE
Goal Outcome Evaluation:                      PT A/OX4, VSS, RA, SR, IVF as ordered. Pt standby assist. 1 unit PRBCs transfused, 1 unit of PLT transfused. No complaints of pain/discomfort from pt throughout the shift. Hem/onc consult called. UA collected.   Problem: Fall Injury Risk  Goal: Absence of Fall and Fall-Related Injury  Outcome: Ongoing, Progressing  Intervention: Identify and Manage Contributors  Recent Flowsheet Documentation  Taken 7/18/2023 0600 by Masha Centeno RN  Medication Review/Management: medications reviewed  Taken 7/18/2023 0206 by Masha Centeno RN  Medication Review/Management: medications reviewed  Intervention: Promote Injury-Free Environment  Recent Flowsheet Documentation  Taken 7/18/2023 0600 by Masha Centeno RN  Safety Promotion/Fall Prevention:   assistive device/personal items within reach   clutter free environment maintained   activity supervised   lighting adjusted   nonskid shoes/slippers when out of bed   safety round/check completed  Taken 7/18/2023 0421 by Masha Centeno RN  Safety Promotion/Fall Prevention:   assistive device/personal items within reach   activity supervised   clutter free environment maintained   lighting adjusted   nonskid shoes/slippers when out of bed   safety round/check completed  Taken 7/18/2023 0206 by Masha Centeno RN  Safety Promotion/Fall Prevention:   activity supervised   assistive device/personal items within reach   clutter free environment maintained   lighting adjusted   nonskid shoes/slippers when out of bed   safety round/check completed  Taken 7/18/2023 0112 by Masha Centeno RN  Safety Promotion/Fall Prevention:   assistive device/personal items within reach   activity supervised   clutter free environment maintained   lighting adjusted   nonskid shoes/slippers when out of bed   safety round/check completed  Taken 7/17/2023 2245 by Masha Centeno RN  Safety Promotion/Fall Prevention:   activity supervised   assistive  device/personal items within reach   clutter free environment maintained   lighting adjusted   nonskid shoes/slippers when out of bed   safety round/check completed  Taken 7/17/2023 2128 by Masha Centeno, RN  Safety Promotion/Fall Prevention:   assistive device/personal items within reach   activity supervised   clutter free environment maintained   lighting adjusted   nonskid shoes/slippers when out of bed   safety round/check completed

## 2023-07-18 NOTE — ED NOTES
Nursing report ED to floor  Patsy Tobin  67 y.o.  female    HPI :   Chief Complaint   Patient presents with    Weakness - Generalized       Admitting doctor:   Edwardo Cole MD    Admitting diagnosis:   The primary encounter diagnosis was Pancytopenia. A diagnosis of Symptomatic anemia was also pertinent to this visit.    Code status:   Current Code Status       Date Active Code Status Order ID Comments User Context       Prior            Allergies:   Celebrex [celecoxib], Arava [leflunomide], Duloxetine, Naproxen, Nsaids, and Plaquenil [hydroxychloroquine sulfate]    Isolation:   No active isolations    Intake and Output  No intake or output data in the 24 hours ending 07/17/23 2040    Weight:       07/17/23  1745   Weight: 81.2 kg (179 lb)       Most recent vitals:   Vitals:    07/17/23 1831 07/17/23 1901 07/17/23 1902 07/17/23 1931   BP: 163/83 150/77  146/86   Pulse: 95  79 82   Resp:       Temp:       TempSrc:       SpO2: 100%  100%    Weight:       Height:           Active LDAs/IV Access:   Lines, Drains & Airways       Active LDAs       Name Placement date Placement time Site Days    Peripheral IV 07/17/23 1821 Anterior;Left;Proximal Forearm 07/17/23 1821  Forearm  less than 1                    Labs (abnormal labs have a star):   Labs Reviewed   COMPREHENSIVE METABOLIC PANEL - Abnormal; Notable for the following components:       Result Value    Glucose 106 (*)     BUN 50 (*)     Creatinine 1.90 (*)     BUN/Creatinine Ratio 26.3 (*)     eGFR 28.5 (*)     All other components within normal limits    Narrative:     GFR Normal >60  Chronic Kidney Disease <60  Kidney Failure <15     CBC WITH AUTO DIFFERENTIAL - Abnormal; Notable for the following components:    WBC 1.55 (*)     RBC 2.36 (*)     Hemoglobin 7.1 (*)     Hematocrit 21.3 (*)     RDW 17.0 (*)     RDW-SD 54.5 (*)     Platelets 10 (*)     All other components within normal limits   MANUAL DIFFERENTIAL - Abnormal; Notable for the following  components:    Neutrophil % 2.0 (*)     Lymphocyte % 96.0 (*)     Monocyte % 2.0 (*)     Neutrophils Absolute 0.03 (*)     Monocytes Absolute 0.03 (*)     All other components within normal limits   LACTIC ACID, PLASMA - Normal   PROTIME-INR - Normal   APTT - Normal   BLOOD CULTURE   BLOOD CULTURE   RAINBOW DRAW    Narrative:     The following orders were created for panel order [place] Draw.  Procedure                               Abnormality         Status                     ---------                               -----------         ------                     Green Top (Gel)[[id number]]                                  Final result               Lavender Top[[id number]]                                     Final result               Gold Top - SST[[id number]]                                   Final result               Light Blue Top[[id number]]                                   Final result                 Please view results for these tests on the individual orders.   IMMATURE PLATELET FRACTION   PROCALCITONIN   URINALYSIS W/ MICROSCOPIC IF INDICATED (NO CULTURE)   TYPE AND SCREEN   PREPARE RBC   CBC AND DIFFERENTIAL    Narrative:     The following orders were created for panel order CBC & Differential.  Procedure                               Abnormality         Status                     ---------                               -----------         ------                     CBC Auto Differential[[id number]]        Abnormal            Final result                 Please view results for these tests on the individual orders.   GREEN TOP   LAVENDER TOP   GOLD TOP - SST   LIGHT BLUE TOP       EKG:   ECG 12 Lead Altered Mental Status   Preliminary Result   HEART RATE= 91  bpm   RR Interval= 659  ms   WI Interval= 144  ms   P Horizontal Axis= -25  deg   P Front Axis= 45  deg   QRSD Interval= 131  ms   QT Interval= 406  ms   QRS Axis= -7  deg   T Wave Axis= 19  deg   - ABNORMAL ECG -   Sinus rhythm   Right bundle branch  block   Electronically Signed By:    Date and Time of Study: 2023 18:18:23          Meds given in ED:   Medications - No data to display    Imaging results:  No radiology results for the last day    Ambulatory status:   - Standby     Social issues:   Social History     Socioeconomic History    Marital status:      Spouse name: Jose    Years of education: High school   Tobacco Use    Smoking status: Former     Packs/day: 1.00     Years: 22.00     Pack years: 22.00     Types: Cigarettes     Quit date: 1988     Years since quittin.5    Smokeless tobacco: Never   Vaping Use    Vaping Use: Never used   Substance and Sexual Activity    Alcohol use: Yes     Comment: Social drinker, do not drink everyday    Drug use: No    Sexual activity: Yes     Partners: Male     Birth control/protection: Surgical     Comment: Hysterectomy in        NIH Stroke Scale:       Evelin Flowers RN  23 20:40 EDT     Call [facility] #[phone] with any questions

## 2023-07-18 NOTE — CASE MANAGEMENT/SOCIAL WORK
Discharge Planning Assessment  [facility]     Patient Name: Patsy Tobin  MRN: [id number]  Today's Date: 7/18/2023    Admit Date: 7/17/2023    Plan: Poss transfer to  pending bed availability   Discharge Needs Assessment       Row Name 07/18/23 1328       Living Environment    People in Home spouse    Current Living Arrangements home    Potentially Unsafe Housing Conditions none    Family Caregiver if Needed spouse;child(naya), adult    Quality of Family Relationships helpful;involved;supportive    Able to Return to Prior Arrangements yes       Transition Planning    Patient/Family Anticipates Transition to home with family    Patient/Family Anticipated Services at Transition none    Transportation Anticipated family or friend will provide       Discharge Needs Assessment    Equipment Currently Used at Home bp cuff    Concerns to be Addressed discharge planning    Equipment Needed After Discharge none    Discharge Facility/Level of Care Needs [facility]                   Discharge Plan       Row Name 07/18/23 1329       Plan    Plan Poss transfer to  pending bed availability    Patient/Family in Agreement with Plan yes    Plan Comments CCP met with pt at bedside, introduced self and role of CCP. Face sheet information and pharmacy verified. Pt lives in a 2-story home with her  with 4STE. Pt still drives, IADL's and has a BP cuff at home. Pt denies having a living will. Pt declines meds to beds and denies trouble affording or managing her medications. Pt has used [facility] in the past and denies SNF history. DC plan is to return home, family to transport. MD then notified CCP would like to transfer pt to . CCP placed call to [facility] transfer line to initiate transfer. Evelyn/ stated she would communicate information to hospitalist at  and would notify CCP when bed becomes available. Evelyn/ stated most [facility] patients follow up in the office and are not typically admitted,  number for [facility] is [phone]. MD, RN and pt notified. EMS notified of possible transfer. Partial packet in mars. Bernard AHMADI/CCP                  Continued Care and Services - Admitted Since 7/17/2023    Coordination has not been started for this encounter.       Expected Discharge Date and Time       Expected Discharge Date Expected Discharge Time    Jul 20, 2023            Demographic Summary    No documentation.                  Functional Status       Row Name 07/18/23 1328       Functional Status    Usual Activity Tolerance good    Current Activity Tolerance moderate       Assessment of Health Literacy    How often do you have someone help you read hospital materials? Never    How often do you have problems learning about your medical condition because of difficulty understanding written information? Never    How often do you have a problem understanding what is told to you about your medical condition? Never    How confident are you filling out medical forms by yourself? Extremely    Health Literacy Excellent       Functional Status, IADL    Medications independent    Meal Preparation independent    Housekeeping independent    Laundry independent    Shopping independent                               Shirin San, RN

## 2023-07-18 NOTE — NURSING NOTE
RN spoke to Robi Salguero MD regarding PLT level. Dr. Salguero ordered for 1 unit of plt to be transfused and a CBC to be collected 30 mins after administration.

## 2023-07-18 NOTE — CASE MANAGEMENT/SOCIAL WORK
Continued Stay Note  [facility]     Patient Name: Patsy Tobin  MRN: [id number]  Today's Date: 7/18/2023    Admit Date: 7/17/2023    Plan:  hospital via [facility] at 5pm   Discharge Plan       Row Name 07/18/23 1511       Plan    Plan  hospital via [facility] at 5pm    Patient/Family in Agreement with Plan yes    Plan Comments RN notified CCP pt has a bed at . CCP placed call to  transfer line to verify.  confirmed pt has a bed room [address] on [address] and number for report is [phone] and fax face sheet to [phone]. MD notified of bed availability-DC orders noted. Films sent to  via radiology and chart copied, printed and placed in packet. EMS transport arranged for 5pm. MD, RN and pt notified of transport time. Packet given to RN. Bernard AHMADI/CCP    Final Discharge Disposition Code 02 - short term hospital for IP care    Final Note  hospital via [facility] at 5pm, no additional CCP needs.      Row Name 07/18/23 9618       Plan    Plan Poss transfer to  pending bed availability    Patient/Family in Agreement with Plan yes    Plan Comments CCP met with pt at bedside, introduced self and role of CCP. Face sheet information and pharmacy verified. Pt lives in a 2-story home with her  with 4STE. Pt still drives, IADL's and has a BP cuff at home. Pt denies having a living will. Pt declines meds to beds and denies trouble affording or managing her medications. Pt has used [facility] in the past and denies SNF history. DC plan is to return home, family to transport. MD then notified CCP would like to transfer pt to . CCP placed call to [facility] transfer line to initiate transfer. [facility] stated she would communicate information to hospitalist at  and would notify CCP when bed becomes available. [facility] stated most [facility] patients follow up in the office and are not typically admitted, number for [facility] is [phone]. MD, RN and pt notified. EMS notified of  possible transfer. Partial packet in [facility]. Bernard AHMADI/CCP                   Discharge Codes    No documentation.                 Expected Discharge Date and Time       Expected Discharge Date Expected Discharge Time    Jul 18, 2023               Shirin San RN

## 2023-07-18 NOTE — CASE MANAGEMENT/SOCIAL WORK
"Physicians Statement of Medical Necessity for  Ambulance Transportation    GENERAL INFORMATION     Name: Patsy Tobin  YOB: 1955  [employer] Medicare #:     [id number]     Transport Date: 7/18/2023 1700 (Valid for round trips this date, or for scheduled repetitive trips for 60 days from the date signed below.)  Origin: [facility]  Destination: [facility] room [phone]   Is the Patient's stay covered under Medicare Part A (PPS/DRG?)Yes  Closest appropriate facility? Yes  If this a hosp-hosp transfer? Yes, describe services needed at 2nd facility not available at 1st facility [place] pt, needs Inpt a/u at [facility]   Is this a hospice patient? No    MEDICAL NECESSITY QUESTIONAIRE    Ambulance Transportation is medically necessary only if other means of transportation are contraindicated or would be potentially harmful to the patient.  To meet this requirement, the patient must be either \"bed confined\" or suffer from a condition such that transport by means other than an ambulance is contraindicated by the patient's condition.  The following questions must be answered by the healthcare professional signing below for this form to be valid:     1) Describe the MEDICAL CONDITION (physical and/or mental) of this patient AT THE TIME OF AMBULANCE TRANSPORT that requires the patient to be transported in an ambulance, and why transport by other means is contraindicated by the patient's condition:   Problems Addressed this Visit          Other    * (Principal) Pancytopenia - Primary     Other Visit Diagnoses       Symptomatic anemia              Diagnoses         Codes Comments    Pancytopenia    -  Primary ICD-10-CM: D61.818  ICD-9-CM: 284.19     Symptomatic anemia     ICD-10-CM: D64.9  ICD-9-CM: 285.9             Patient Active Problem List   Diagnosis    Thrombophlebitis leg    Rectal bleeding    Anemia    Gastroesophageal reflux disease    Chronic pulmonary " embolism    Depressive disorder    Gastric reflux    Hyperlipidemia    Hypertension    Mitral valve prolapse    Phlebitis    Pleurisy with effusion, with mention of bacterial cause other than tuberculosis    Pneumonia    Rheumatoid arthritis    Preoperative examination, unspecified    Right bundle-branch block    Serum creatinine raised    Overweight    Esophageal dysphagia    Abnormal esophagram    History of pulmonary embolism    History of DVT (deep vein thrombosis)    Gout    Pancytopenia       Past Medical History:   Diagnosis Date    Abdominal pain of multiple sites     NAKIA (acute kidney injury) 5/7/2023    Allergic rhinitis     AMEYA positive     Anemia     Blurred vision, bilateral     Deep vein thrombosis     Depression     Diarrhea     Esophagitis 2016    Fatigue     Gastric ulcer     GERD (gastroesophageal reflux disease)     Headache     High blood pressure     Hyperlipidemia     Hypertension     Leg cramps     Leg wound, right     Mitral valve prolapse     Multiple joint pain     Overweight     Peptic ulceration     PONV (postoperative nausea and vomiting)     Pulmonary embolism 11/25/2021    RA (rheumatoid arthritis)     Superficial phlebitis     in left ankle     Tattoos     permanent makeup       Past Surgical History:   Procedure Laterality Date    ABDOMINOPLASTY N/A 12/02/2014    Due to appendectomy site having staph infection-Dr. Keagan Yip    APPENDECTOMY N/A 1958    Dr. Henry    COLONOSCOPY  2018    [facility]    COLONOSCOPY N/A 03/12/2021    Procedure: COLONOSCOPY;  Surgeon: Rajesh Loco MD;  Location: [facility] MAIN OR;  Service: Gastroenterology;  Laterality: N/A;    ELBOW ARTHROPLASTY      ENDOSCOPY N/A 2016    Dr. Rajesh Loco     ENDOSCOPY N/A 03/12/2021    Procedure: ESOPHAGOGASTRODUODENOSCOPY;  Surgeon: Rajesh Loco MD;  Location: [facility] MAIN OR;  Service: Gastroenterology;  Laterality: N/A;    ENDOSCOPY N/A 11/21/2022    Procedure: ESOPHAGOGASTRODUODENOSCOPY WITH BIOPSY AND  "DILATION;  Surgeon: Rajesh Loco MD;  Location: [facility];  Service: Gastroenterology;  Laterality: N/A;  mild gastritis, gastric polyp  15 - 18 mm balloon used.    FLEXIBLE SIGMOIDOSCOPY N/A 2016    Dr. Rajesh Loco    HEMORRHOIDECTOMY N/A 2009    Dr. Saba    HIP BIPOLAR REPLACEMENT      KNEE ARTHROSCOPY W/ MENISCECTOMY Left 2004    Dr. Rice    KNEE MENISCECTOMY Right 2007    Dr. Rice    SUBTOTAL HYSTERECTOMY Bilateral 1988    Dr. Sanford. Not due to cancer. Robotic-assisted     TUBAL ABDOMINAL LIGATION Bilateral     UPPER GASTROINTESTINAL ENDOSCOPY N/A 10/20/2016    UGI with biopsy. Normal esophagus: injected with botulinum toxin, Erythematous mucoas in the antrum: biopsied, normal duodenum-Dr. Rajesh Loco      2) Is this patient \"bed confined\" as defined below?No    To be \"bed confined\" the patient must satisfy all three of the following criteria:  (1) unable to get up from bed without assistance; AND (2) unable to ambulate;  AND (3) unable to sit in a chair or wheelchair.  3) Can this patient safely be transported by car or wheelchair van (I.e., may safely sit during transport, without an attendant or monitoring?)No   4. In addition to completing questions 1-3 above, please check any of the following conditions that apply*:          *Note: supporting documentation for any boxes checked must be maintained in the patient's medical records Other WBC1.55, Plt <2, Hgb7.0 after bld transfusion.       SIGNATURE OF PHYSICIAN OR OTHER AUTHORIZED HEALTHCARE PROFESSIONAL    I certify that the above information is true and correct based on my evaluation of this patient, and represent that the patient requires transport by ambulance and that other forms of transport are contraindicated.  I understand that this information will be used by the Centers for Medicare and Medicaid Services (CMS) to support the determiniation of medical necessity for ambulance services, and I represent that I have personal " knowledge of the patient's condition at the time of transport.       If this box is checked, I also certify that the patient is physically or mentally incapable of signing the ambulance service's claim form and that the institution with which I am affiliated has furnished care, services or assistance to the patient.  My signature below is made on behalf of the patient pursuant to 42 .36(b)(4). In accordance with 42 .37, the specific reason(s) that the patient is physically or mentally incapable of signing the claim for is as follows:     Signature of Physician or Healthcare Professional     Shirin San RN/JESSY  Date/Time:     7/18/2023 1700     (For Scheduled repetitive transport, this form is not valid for transports performed more than 60 days after this date).                                                                                                                                            --------------------------------------------------------------------------------------------  Printed Name and Credentials of Physician or Authorized Healthcare Professional     *Form must be signed by patient's attending physician for scheduled, repetitive transports,.  For non-repetitive ambulance transports, if unable to obtain the signature of the attending physician, any of the following may sign (please select below):     Physician  Clinical Nurse Specialist  Registered Nurse     Physician Assistant  Discharge Planner  Licensed Practical Nurse     Nurse Practitioner

## 2023-07-18 NOTE — CONSULTS
.     REASON FOR CONSULTATION:   Severe acute pancytopenia  Provide an opinion on any further workup or treatment                             REQUESTING PHYSICIAN: Edwardo Cole MD  RECORDS OBTAINED:  Records of the patient's history including those from the electronic medical record were reviewed and summarized in detail.    HISTORY OF PRESENT ILLNESS:  The patient is a 67 y.o. year old female  who is here for follow-up with the above-mentioned history.    Has rheumatoid arthritis.  Has been on Enbrel since around 2017.  States RA is well controlled.  Has had no recent infections.  Has been feeling fine until about 4 days ago when she began to feel significantly lightheaded and noticed bruising on her arms.  She went to her PCP and severe pancytopenia was found which was new compared to 2 months prior (anemia is longstanding, but ANC only 20 and PLT only 2 as compared to normal values 2 months prior).    Denies bleeding.  Denies fever, chills, weight loss, night sweats    Denies any new medicines over these past 2 months.  Her newest medicine was Ozempic which was started in March (it is not associated with cytopenias).  Other than that, allopurinol and 1 tablet of every other day colchicine was started in January for severe gout.  She has remained on colchicine during this time.  Colchicine can cause with aplastic anemia.    Past Medical History:   Diagnosis Date    Abdominal pain of multiple sites     NAKIA (acute kidney injury) 5/7/2023    Allergic rhinitis     AMEYA positive     Anemia     Blurred vision, bilateral     Deep vein thrombosis     Depression     Diarrhea     Esophagitis 2016    Fatigue     Gastric ulcer     GERD (gastroesophageal reflux disease)     Headache     High blood pressure     Hyperlipidemia     Hypertension     Leg cramps     Leg wound, right     Mitral valve prolapse     Multiple joint pain     Overweight     Peptic ulceration     PONV (postoperative nausea and vomiting)     Pulmonary  embolism 11/25/2021    RA (rheumatoid arthritis)     Superficial phlebitis     in left ankle     Tattoos     permanent makeup      Past Surgical History:   Procedure Laterality Date    ABDOMINOPLASTY N/A 12/02/2014    Due to appendectomy site having staph infection-Dr. Keagan Yip    APPENDECTOMY N/A 1958    Dr. Henry    COLONOSCOPY  2018    [facility]    COLONOSCOPY N/A 03/12/2021    Procedure: COLONOSCOPY;  Surgeon: Rajesh Loco MD;  Location: [facility];  Service: Gastroenterology;  Laterality: N/A;    ELBOW ARTHROPLASTY      ENDOSCOPY N/A 2016    Dr. Rajesh Loco     ENDOSCOPY N/A 03/12/2021    Procedure: ESOPHAGOGASTRODUODENOSCOPY;  Surgeon: Rajesh Loco MD;  Location: [facility];  Service: Gastroenterology;  Laterality: N/A;    ENDOSCOPY N/A 11/21/2022    Procedure: ESOPHAGOGASTRODUODENOSCOPY WITH BIOPSY AND DILATION;  Surgeon: Rajesh Loco MD;  Location: [facility];  Service: Gastroenterology;  Laterality: N/A;  mild gastritis, gastric polyp  15 - 18 mm balloon used.    FLEXIBLE SIGMOIDOSCOPY N/A 2016    Dr. Rajesh Loco    HEMORRHOIDECTOMY N/A 2009    Dr. Saba    HIP BIPOLAR REPLACEMENT      KNEE ARTHROSCOPY W/ MENISCECTOMY Left 2004    Dr. Rice    KNEE MENISCECTOMY Right 2007    Dr. Rice    SUBTOTAL HYSTERECTOMY Bilateral 1988    Dr. Sanford. Not due to cancer. Robotic-assisted     TUBAL ABDOMINAL LIGATION Bilateral     UPPER GASTROINTESTINAL ENDOSCOPY N/A 10/20/2016    UGI with biopsy. Normal esophagus: injected with botulinum toxin, Erythematous mucoas in the antrum: biopsied, normal duodenum-Dr. Rajesh Loco       MEDICATIONS    Current Facility-Administered Medications:     famotidine (PEPCID) tablet 20 mg, 20 mg, Oral, BID, Edwardo Cole MD, 20 mg at 07/18/23 0908    sodium chloride 0.9 % with KCl 20 mEq/L infusion, 75 mL/hr, Intravenous, Continuous, Edwardo Cole MD, Last Rate: 75 mL/hr at 07/18/23 0205, 75 mL/hr at 07/18/23 0205    ALLERGIES:     Allergies   Allergen  Reactions    Celebrex [Celecoxib] Other (See Comments)     Passed Out     Arava [Leflunomide] Other (See Comments)     Altered Glucose Levels    Duloxetine Hallucinations    Naproxen GI Intolerance     All NSAIDS     Nsaids GI Intolerance     Other reaction(s): Stomach upset      Plaquenil [Hydroxychloroquine Sulfate] Mental Status Change       SOCIAL HISTORY:       Social History     Socioeconomic History    Marital status:      Spouse name: Jose    Years of education: High school   Tobacco Use    Smoking status: Former     Packs/day: 1.00     Years: 22.00     Pack years: 22.00     Types: Cigarettes     Quit date: 1988     Years since quittin.5    Smokeless tobacco: Never   Vaping Use    Vaping Use: Never used   Substance and Sexual Activity    Alcohol use: Yes     Comment: Social drinker, do not drink everyday    Drug use: No    Sexual activity: Yes     Partners: Male     Birth control/protection: Surgical     Comment: Hysterectomy in          FAMILY HISTORY:  Family History   Problem Relation Age of Onset    Heart failure Mother     Hypertension Mother     Cancer Father         kidney    Colon polyps Brother         Brother    Cancer Brother         Lung and Liver Cancer    Diabetes Maternal Grandmother     Colon cancer Neg Hx     Crohn's disease Neg Hx     Irritable bowel syndrome Neg Hx     Ulcerative colitis Neg Hx        REVIEW OF SYSTEMS:  Review of Systems   Constitutional:  Negative for activity change.   HENT:  Negative for nosebleeds and trouble swallowing.    Respiratory:  Negative for shortness of breath and wheezing.    Cardiovascular:  Negative for chest pain and palpitations.   Gastrointestinal:  Negative for constipation, diarrhea and nausea.   Genitourinary:  Negative for dysuria and hematuria.   Musculoskeletal:  Negative for arthralgias and myalgias.   Skin:  Negative for rash and wound.   Neurological:  Negative for seizures and syncope.   Hematological:  Negative for  adenopathy. Does not bruise/bleed easily.   Psychiatric/Behavioral:  Negative for confusion.             Vitals:    07/18/23 0206 07/18/23 0525 07/18/23 0559 07/18/23 0724   BP: 121/57 108/55 106/61 125/61   BP Location:    Left arm   Patient Position:    Lying   Pulse: 89 97 71 80   Resp: 16 18 18 18   Temp: 98.2 °F (36.8 °C) 98.6 °F (37 °C) 98.1 °F (36.7 °C) 98.1 °F (36.7 °C)   TempSrc: Oral Oral Oral Oral   SpO2: 100% 100% 100% 100%   Weight:       Height:             4/24/2017     1:18 PM   Current Status   ECOG score 0      PHYSICAL EXAM:    CONSTITUTIONAL:  Vital signs reviewed.  No distress, looks comfortable.  EYES:  Conjunctivae and lids unremarkable.  PERRLA  EARS, NOSE, MOUTH, THROAT:  Ears and nose appear unremarkable.  Lips, teeth, gums appear unremarkable.  RESPIRATORY:  Normal respiratory effort.  Lungs clear to auscultation bilaterally.  CARDIOVASCULAR:  Normal S1, S2.  No murmurs, rubs or gallops.  No significant lower extremity edema.  GASTROINTESTINAL: Abdomen appears unremarkable.  Nontender.  No hepatomegaly.  No splenomegaly.  LYMPHATIC:  No cervical, supraclavicular, axillary lymphadenopathy.  NEURO: Cranial nerves 2-12 grossly intact.  No focal deficits.  Appears to have equal strength all 4 extremities.  MUSCULOSKELETAL:  Unremarkable digits/nails.  No cyanosis or clubbing.  No apparent joint deformities.  SKIN:  Warm.  No rashes.  PSYCHIATRIC:  Normal judgment and insight.  Normal mood and affect.     RECENT LABS:        WBC   Date Value Ref Range Status   07/18/2023 1.55 (C) 3.40 - 10.80 10*3/mm3 Final   07/18/2023 1.65 (C) 3.40 - 10.80 10*3/mm3 Final   07/17/2023 1.55 (C) 3.40 - 10.80 10*3/mm3 Final   07/17/2023 1.43 (C) 3.40 - 10.80 10*3/mm3 Final     Hemoglobin   Date Value Ref Range Status   07/18/2023 7.0 (L) 12.0 - 15.9 g/dL Final   07/18/2023 7.7 (L) 12.0 - 15.9 g/dL Final   07/17/2023 7.1 (L) 12.0 - 15.9 g/dL Final   07/17/2023 8.0 (L) 12.0 - 15.9 g/dL Final     Platelets   Date  Value Ref Range Status   07/18/2023 57 (L) 140 - 450 10*3/mm3 Final   07/18/2023 57 (L) 140 - 450 10*3/mm3 Final   07/18/2023 <2 (C) 140 - 450 10*3/mm3 Final   07/18/2023 <2 (C) 140 - 450 10*3/mm3 Final   07/17/2023 10 (C) 140 - 450 10*3/mm3 Final   07/17/2023 2 (C) 140 - 450 10*3/mm3 Final       Assessment & Plan   Patsy MILLER Crista   *Leukocytopenia  WBC 1.4-1.6 this admission, from 3.9 on 5/9/2023    *Neutropenia  ANC 10-30 this admission, from 2260 on 5/9/2023    *Anemia  5/9/2023: Unremarkable: B12 and folate, but was iron deficient at that time with ferritin 35 and 7% saturation.  Hb 7-8 this admission, from 9.2 on 5/9/2023    *Thrombocytopenia  PLT up to 57 on 7/18/2023 after 1 unit PLT when PLT <2 this admission, as compared to 187 on 5/9/2023    *Etiology of acute onset severe pancytopenia  Clinically this seems most consistent with severe aplastic anemia, which may be due to colchicine (colchicine started January 2023).  No recent infections.  Her newest medicine was Ozempic which was started in March (it is not associated with cytopenias).  Other than that, allopurinol and 1 tablet of every other day colchicine was started in January for severe gout.  She has remained on colchicine during this time.   Reticulocyte percent 0.34%.  Absolute reticulocyte count undetectable, <0.01.  IPF only 0.8.  Those labs are consistent with a production problem, which is consistent with a marrow failure problem.  No blasts seen on smear, therefore less likely to be acute leukemia.  No INR or PTT problems, therefore less likely to be APL or DIC  Received 1 unit PRBC 7/17/2023 11 PM, completed 7/18/2023 at 2 AM, improving Hb from 7.1, up to 7.7 on 7/18/2023 at 3:30 AM, but Hb dropped again, down to 7, on 7/18/2023 at 8:30 AM.  Received 1 unit PLT 7/18/2023 at 5:41 AM, improving PLT from <2, up to 57  I discussed with Dr. Rowley, [facility] BMT attending.  He has agreed to accept this patient in transfer.  They prefer to do the  work-up at their facility and therefore I have not done a bone marrow biopsy or flow for [facility] here.    *rheumatoid arthritis.  Has been on Enbrel since around 2017.  States RA is well controlled.      Plan  Check ferritin, iron panel, reticulate hemoglobin, B12, serum folate.  Transfer to [facility] BMT inpatient for further work-up/evaluation/treatment and all further follow-up.  (Transferring care to Dr. Rowley.  Patient will not need any follow-up in our practice).    136 minutes.  Total time.  Same day.  More time was spent than what is reflected in chart time.  I also reviewed guidelines and discussed with the oncology pharmacist, Dr. Salguero of our practice, Dr. Fitzgerald and Dr. Rowley of the [facility] BMT practice.

## 2023-07-18 NOTE — CASE MANAGEMENT/SOCIAL WORK
Case Management Discharge Note      Final Note: [facility] via [facility] at 5pm, no additional CCP needs.         Selected Continued Care - Admitted Since 7/17/2023       Destination    No services have been selected for the patient.                Durable Medical Equipment    No services have been selected for the patient.                Dialysis/Infusion    No services have been selected for the patient.                Home Medical Care    No services have been selected for the patient.                Therapy    No services have been selected for the patient.                Community Resources    No services have been selected for the patient.                Community & DME    No services have been selected for the patient.                         Final Discharge Disposition Code: 02 - [facility] for [facility]

## 2023-07-22 LAB
BACTERIA SPEC AEROBE CULT: NORMAL
BACTERIA SPEC AEROBE CULT: NORMAL

## 2023-08-11 ENCOUNTER — HOSPITAL ENCOUNTER (OUTPATIENT)
Dept: MAMMOGRAPHY | Facility: HOSPITAL | Age: 68
Discharge: HOME | End: 2023-08-11
Payer: MEDICARE

## 2023-08-16 ENCOUNTER — TELEPHONE (OUTPATIENT)
Dept: GASTROENTEROLOGY | Facility: CLINIC | Age: 68
End: 2023-08-16
Payer: MEDICARE
